# Patient Record
Sex: MALE | Race: WHITE | NOT HISPANIC OR LATINO | Employment: OTHER | URBAN - NONMETROPOLITAN AREA
[De-identification: names, ages, dates, MRNs, and addresses within clinical notes are randomized per-mention and may not be internally consistent; named-entity substitution may affect disease eponyms.]

---

## 2017-01-04 ENCOUNTER — OFFICE VISIT (OUTPATIENT)
Dept: OPHTHALMOLOGY | Facility: CLINIC | Age: 62
End: 2017-01-04

## 2017-01-04 ENCOUNTER — OFFICE VISIT (OUTPATIENT)
Dept: ENDOCRINOLOGY | Facility: CLINIC | Age: 62
End: 2017-01-04

## 2017-01-04 VITALS
SYSTOLIC BLOOD PRESSURE: 128 MMHG | WEIGHT: 241.7 LBS | DIASTOLIC BLOOD PRESSURE: 80 MMHG | HEART RATE: 56 BPM | HEIGHT: 73 IN | BODY MASS INDEX: 32.03 KG/M2

## 2017-01-04 DIAGNOSIS — R94.6 ABNORMAL THYROID FUNCTION TEST: ICD-10-CM

## 2017-01-04 DIAGNOSIS — H26.9 CATARACT: ICD-10-CM

## 2017-01-04 DIAGNOSIS — I10 ESSENTIAL HYPERTENSION: ICD-10-CM

## 2017-01-04 DIAGNOSIS — E78.2 MIXED HYPERLIPIDEMIA: ICD-10-CM

## 2017-01-04 DIAGNOSIS — E11.9 DIABETES MELLITUS WITHOUT COMPLICATION (HCC): Primary | ICD-10-CM

## 2017-01-04 DIAGNOSIS — E55.9 VITAMIN D DEFICIENCY: ICD-10-CM

## 2017-01-04 PROCEDURE — 99214 OFFICE O/P EST MOD 30 MIN: CPT | Performed by: NURSE PRACTITIONER

## 2017-01-04 PROCEDURE — 99213 OFFICE O/P EST LOW 20 MIN: CPT | Performed by: OPHTHALMOLOGY

## 2017-01-04 NOTE — PROGRESS NOTES
Subjective    Chasity Xavier is a 61 y.o. male. he is here today for follow-up.    History of Present Illness     Duration/Timing:  Diabetes mellitus type 2, Age at onset of diabetes: 35 years, Onset of symptoms gradual  timing - constant    quality - uncontrolled    severity - high since disease related complications    Has been in the hospital since last visit due to his heart     Severity (Complications/Hospitalizations)  Secondary Macrovascular Complications:  CAD  CHF, EF 20%    CABG x 5 4 weeks ago in [place]--March 2014    defibrillator replaced March 2015        Secondary Microvascular Complications:  Date of last Microalbumin Assessment 04/28/2014, No proteinuria, Date of last Protein/Creatinine Assessment August 2013    Context  Diabetes Regimen:  Insulin, Not on Oral Medications, Compliant with regimen, Last HgbA1c% 8 from Dec. 2016  Blood Glucose Readings  states no higher than 160 on his meter  Diet  approx. 60 to 75 g cho per meal  Exercise:  Exercises  walking    Associated Signs/Symptoms  Hyperglycemic Symptoms:  No polyuria, No polydipsia, No polyphagia, Weight loss  Hypoglycemic Episodes:  No documented hypoglycemia      The following portions of the patient's history were reviewed and updated as appropriate:   Past Medical History   Diagnosis Date   • Chronic kidney disease    • Coronary atherosclerosis    • Hyperlipidemia    • Ischemic cardiomyopathy    • Obesity    • DULCE MARIA (obstructive sleep apnea)    • Osteoarthritis    • Solitary kidney, congenital    • Type 2 diabetes mellitus      Past Surgical History   Procedure Laterality Date   • Coronary artery bypass graft       x 5     History reviewed. No pertinent family history.    Current Outpatient Prescriptions   Medication Sig Dispense Refill   • amiodarone (PACERONE) 200 MG tablet Take 200 mg by mouth 2 (two) times a day.     • aspirin 81 MG EC tablet Take 1 tablet by mouth daily.     • atorvastatin (LIPITOR) 80 MG tablet Take 1 tablet  by mouth nightly.     • carvedilol (COREG) 12.5 MG tablet Take 12.5 mg by mouth 2 (two) times a day with meals.     • carvedilol CR (COREG CR) 80 MG 24 hr capsule Take 80 mg by mouth.     • cetirizine (zyrTEC) 10 MG tablet Take 1 tablet by mouth Daily. 30 tablet 1   • clopidogrel (PLAVIX) 75 MG tablet Take 1 tablet by mouth daily.     • Dapagliflozin-Metformin HCl ER 5-1000 MG tablet sustained-release 24 hour Take 1 tablet by mouth Daily. 30 tablet 2   • glucose blood test strip Accu-Chek Xin Plus In Vitro Strip; Patient Sig: Accu-Chek Xin Plus In Vitro Strip ; 200; 0; 13-May-2013; Active     • guaiFENesin (MUCINEX) 600 MG 12 hr tablet Take 2 tablets by mouth 2 (Two) Times a Day. 20 tablet 1   • Multiple Vitamins-Minerals (MULTIVITAL) tablet Take 1 tablet by mouth daily.     • omega-3 acid ethyl esters (LOVAZA) 1 G capsule Take 2 capsules by mouth daily.     • valsartan (DIOVAN) 80 MG tablet Take 40 mg by mouth Daily.       No current facility-administered medications for this visit.      No Known Allergies  Social History     Social History   • Marital status:      Spouse name: N/A   • Number of children: N/A   • Years of education: N/A     Social History Main Topics   • Smoking status: Never Smoker   • Smokeless tobacco: None   • Alcohol use No   • Drug use: Defer   • Sexual activity: Defer     Other Topics Concern   • None     Social History Narrative       Review of Systems  Review of Systems   Constitutional: Negative for activity change, appetite change, chills, diaphoresis and fatigue.   HENT: Negative for congestion, dental problem, drooling, ear discharge, ear pain, facial swelling, sneezing, sore throat, tinnitus, trouble swallowing and voice change.    Eyes: Negative for photophobia, pain, discharge, redness, itching and visual disturbance.   Respiratory: Negative for apnea, cough, choking, chest tightness and shortness of breath.    Cardiovascular: Negative for chest pain, palpitations and leg  "swelling.   Gastrointestinal: Negative for abdominal distention, abdominal pain, constipation, diarrhea, nausea and vomiting.   Endocrine: Negative for cold intolerance, heat intolerance, polydipsia, polyphagia and polyuria.   Genitourinary: Negative for difficulty urinating, dysuria, frequency, hematuria and urgency.   Musculoskeletal: Negative for arthralgias, back pain, gait problem, joint swelling, myalgias, neck pain and neck stiffness.   Skin: Negative for color change, pallor, rash and wound.   Allergic/Immunologic: Negative for environmental allergies, food allergies and immunocompromised state.   Neurological: Negative for dizziness, tremors, facial asymmetry, weakness, light-headedness, numbness and headaches.   Hematological: Negative for adenopathy. Does not bruise/bleed easily.   Psychiatric/Behavioral: Negative for agitation, behavioral problems, confusion, decreased concentration, dysphoric mood and sleep disturbance.        Objective      Visit Vitals   • /80 (BP Location: Left arm, Patient Position: Sitting, Cuff Size: Adult)   • Pulse 56   • Ht 73\" (185.4 cm)   • Wt 241 lb 11.2 oz (110 kg)   • BMI 31.89 kg/m2     Physical Exam   Constitutional: He is oriented to person, place, and time. He appears well-developed and well-nourished. No distress.   HENT:   Head: Normocephalic and atraumatic.   Right Ear: External ear normal.   Left Ear: External ear normal.   Nose: Nose normal.   Eyes: Conjunctivae and EOM are normal. Pupils are equal, round, and reactive to light.   Neck: Normal range of motion. Neck supple. No tracheal deviation present. No thyromegaly present.   Cardiovascular: Normal rate, regular rhythm and normal heart sounds.    No murmur heard.  Pulmonary/Chest: Effort normal and breath sounds normal. No respiratory distress. He has no wheezes.   Abdominal: Soft. Bowel sounds are normal. There is no tenderness. There is no rebound and no guarding.   Musculoskeletal: Normal range of " motion. He exhibits no edema, tenderness or deformity.   Neurological: He is alert and oriented to person, place, and time. No cranial nerve deficit.   Skin: Skin is warm and dry. No rash noted.   Psychiatric: He has a normal mood and affect. His behavior is normal. Judgment and thought content normal.       Lab Review  GLUCOSE (mg/dl)   Date Value   12/14/2016 170 (H)   11/05/2016 175 (H)   11/04/2016 372 (H)     SODIUM (mmol/L)   Date Value   12/14/2016 143   11/05/2016 143   11/04/2016 139     POTASSIUM (mmol/L)   Date Value   12/14/2016 4.3   11/05/2016 4.4   11/04/2016 4.5     CHLORIDE (mmol/L)   Date Value   12/14/2016 104   11/05/2016 107   11/04/2016 104     CO2 (mmol/L)   Date Value   12/14/2016 22   11/05/2016 26   11/04/2016 24     BUN (mg/dl)   Date Value   12/14/2016 8   11/05/2016 15   11/04/2016 16     CREATININE (mg/dl)   Date Value   12/14/2016 0.8   11/05/2016 0.9   11/04/2016 1.0     HEMOGLOBIN A1C (%TotHgb)   Date Value   12/12/2016 8.0 (H)   05/31/2016 10.1 (H)   01/08/2016 8.6 (H)     TRIGLYCERIDES (mg/dl)   Date Value   05/31/2016 219 (H)   09/02/2015 239 (H)   04/29/2014 135     LDL CHOLESTEROL  (mg/dL)   Date Value   03/22/2014     Unable to perform calculation due to elevated triglycerides. Direct LDL performed.       Assessment/Plan      1. Diabetes mellitus without complication    2. Mixed hyperlipidemia    3. Essential hypertension    4. Abnormal thyroid function test    5. Vitamin D deficiency    .    Medications prescribed:  Outpatient Encounter Prescriptions as of 1/4/2017   Medication Sig Dispense Refill   • amiodarone (PACERONE) 200 MG tablet Take 200 mg by mouth 2 (two) times a day.     • aspirin 81 MG EC tablet Take 1 tablet by mouth daily.     • atorvastatin (LIPITOR) 80 MG tablet Take 1 tablet by mouth nightly.     • carvedilol (COREG) 12.5 MG tablet Take 12.5 mg by mouth 2 (two) times a day with meals.     • carvedilol CR (COREG CR) 80 MG 24 hr capsule Take 80 mg by mouth.      • cetirizine (zyrTEC) 10 MG tablet Take 1 tablet by mouth Daily. 30 tablet 1   • clopidogrel (PLAVIX) 75 MG tablet Take 1 tablet by mouth daily.     • Dapagliflozin-Metformin HCl ER 5-1000 MG tablet sustained-release 24 hour Take 1 tablet by mouth Daily. 30 tablet 2   • glucose blood test strip Accu-Chek Xin Plus In Vitro Strip; Patient Sig: Accu-Chek Xin Plus In Vitro Strip ; 200; 0; 13-May-2013; Active     • guaiFENesin (MUCINEX) 600 MG 12 hr tablet Take 2 tablets by mouth 2 (Two) Times a Day. 20 tablet 1   • Multiple Vitamins-Minerals (MULTIVITAL) tablet Take 1 tablet by mouth daily.     • omega-3 acid ethyl esters (LOVAZA) 1 G capsule Take 2 capsules by mouth daily.     • valsartan (DIOVAN) 80 MG tablet Take 40 mg by mouth Daily.       No facility-administered encounter medications on file as of 1/4/2017.        Orders placed during this encounter include:  Orders Placed This Encounter   Procedures   • Comprehensive Metabolic Panel   • Hemoglobin A1c   • Vitamin D 25 Hydroxy   • TSH   • Lipid Panel     Glycemic Management      stopped lantus -- restart    start Toujeo 20 units at night---not taking --please restart    discussed self titration    stopped the novolog with meals -- please restart    start Novolog 1 unit per 15 grams of CHO     + correction      add for every carb, he has been drinking milk without insulin     farxiga--Xigduo  5/1000mg one daily with breakfast    side effects discussed     tanzeum 30mg sc once weekly--stopped causing vomiting     if you have low sugars after meals stop the humalog  Lipid Management  current regimen     Niaspan 500 mg q day--stopped  Pravachol 20 mg q day-- stopped  Lovaza 1 gram b.i.d.-- taking      taking Lipitor 80 mg daily      June 2016    total chol - 102  Tg - 219  HDL - 29  LDL - 29      Blood Pressure Management:  Control of blood pressure  regimen     diovan 40mg one daily  coreg- 6.25 one tablet BID    No longer on amiodarone    Dr. Mix adjusts      Microvascular Complication Monitoring    last eye exam Jan. 2017 --no DR    No neuropathy  Immunizations:  Last influenza immunization Oct. 2016  [facility]    Vitamin d def  April 2014    Vit. D - 29    taking MVI daily    June 2016    vit d - nl  Other Diabetes Related Aspects  April 2014    B12- nl    TSH- 4.67-- will reassess    May 2015    TSH - nl    June 2016    TSH - nl        4. Follow-up: Return in about 6 months (around 7/4/2017) for Recheck.

## 2017-01-04 NOTE — PROGRESS NOTES
Subjective   Chasity Xavier is a 61 y.o. male.   Chief Complaint   Patient presents with   • Diabetic Eye Exam       HPI     Diabetic Eye Exam   Vision is stable.  Associated symptoms include blurred vision.  Diabetes characteristics include Type 2 and taking oral medications.  Duration of years.  Blood sugar level fluctuates.       Last edited by Arsen Szymanski MD on 1/4/2017  8:34 AM. (History)          Review of Systems   Eyes: Positive for visual disturbance. Negative for pain.       Objective   Visual Acuity (Snellen - Linear)      Right Left   Dist sc 20/100 -2 20/40            Manifest Refraction      Sphere Cylinder Axis Add   Right -1.50 +0.25 075 +2.25   Left -0.50   +2.25               Pupils      Pupils   Right PERRL   Left PERRL           Confrontational Visual Fields     Visual Fields      Left Right   Result Full Full                  Extraocular Movement      Right Left   Result Full, Ortho Full, Ortho              Tonometry (Applanation, 8:37 AM)      Right Left   Pressure 16 16              Main Ophthalmology Exam     External Exam      Right Left    External Normal Normal      Slit Lamp Exam      Right Left    Lids/Lashes Normal Normal    Conjunctiva/Sclera White and quiet White and quiet    Cornea Clear Clear    Anterior Chamber Deep and quiet Deep and quiet    Iris Round and reactive Round and reactive    Lens 1+ Nuclear sclerosis 1+ Nuclear sclerosis    Vitreous Normal Normal      Fundus Exam      Right Left    Disc Normal Normal    Macula Normal Normal    Vessels Normal Normal    Periphery Normal Normal    No BDR                Assessment/Plan   Chasity was seen today for diabetic eye exam.    Diagnoses and all orders for this visit:    Diabetes mellitus without complication    Cataract

## 2017-01-04 NOTE — MR AVS SNAPSHOT
Chasity Ryan Xavier   1/4/2017 1:15 PM   Office Visit    Dept Phone:  [phone]   Encounter #:  [id number]    Provider:  ROSAMARIA Silverman   Department:  [facility] ENDOCRINOLOGY                Your Full Care Plan              Your Updated Medication List          This list is accurate as of: 1/4/17  1:45 PM.  Always use your most recent med list.                amiodarone 200 MG tablet   Commonly known as:  PACERONE       aspirin 81 MG EC tablet       atorvastatin 80 MG tablet   Commonly known as:  LIPITOR       carvedilol 12.5 MG tablet   Commonly known as:  COREG       cetirizine 10 MG tablet   Commonly known as:  zyrTEC   Take 1 tablet by mouth Daily.       clopidogrel 75 MG tablet   Commonly known as:  PLAVIX       COREG CR 80 MG 24 hr capsule   Generic drug:  carvedilol CR       Dapagliflozin-Metformin HCl ER 5-1000 MG tablet sustained-release 24 hour   Take 1 tablet by mouth Daily.       glucose blood test strip       guaiFENesin 600 MG 12 hr tablet   Commonly known as:  MUCINEX   Take 2 tablets by mouth 2 (Two) Times a Day.       MULTIVITAL tablet       omega-3 acid ethyl esters 1 G capsule   Commonly known as:  LOVAZA       valsartan 80 MG tablet   Commonly known as:  DIOVAN               We Performed the Following     CBC & Differential     Comprehensive Metabolic Panel     Hemoglobin A1c     Lipid Panel     TSH     Vitamin D 25 Hydroxy       You Were Diagnosed With        Codes Comments    Diabetes mellitus without complication    -  Primary ICD-10-CM: E11.9  ICD-9-CM: 250.00     Mixed hyperlipidemia     ICD-10-CM: E78.2  ICD-9-CM: 272.2     Essential hypertension     ICD-10-CM: I10  ICD-9-CM: 401.9     Abnormal thyroid function test     ICD-10-CM: R94.6  ICD-9-CM: 794.5     Vitamin D deficiency     ICD-10-CM: E55.9  ICD-9-CM: 268.9       Instructions     None    Patient Instructions History      Upcoming Appointments     Visit Type Date Time  "Department    FOLLOW UP 1/4/2017  1:15 PM [facility] ENDOCRINOLOGY MAD    OFFICE VISIT 1/4/2017  8:15 AM [facility] OPHTHALMOLOGY MAD    OFFICE VISIT 1/20/2017  9:00 AM [facility] FM RESIDENT MAD    FOLLOW UP 7/5/2017  8:00 AM [facility] ENDOCRINOLOGY MAD      MyChart Signup     Our records indicate that you have declined [facility] MyChart signup. If you would like to sign up for MyChart, please email [email or web address] or call [phone] to obtain an activation code.             Other Info from Your Visit           Your Appointments     Jan 20, 2017  9:00 AM CST   Office Visit with Akbar Napoles MD   [facility] FAMILY MEDICINE (--)    [address]   [facility] [address] [phone]           Arrive 15 minutes prior to appointment.            Jul 05, 2017  8:00 AM CDT   Follow Up with ROSAMARIA Silverman   [facility] ENDOCRINOLOGY (--)    [address]  [address] [place] [address] [phone]           Arrive 15 minutes prior to appointment.            Petr 10, 2018  8:15 AM CST   Office Visit with Arsen Szymanski MD   [facility] OPHTHALMOLOGY (--)    [address] [place] [address] [address]  [address] [place] [address] [phone]           Arrive 15 minutes prior to appointment.              Allergies     No Known Allergies      Reason for Visit     Diabetes luis miguel      Vital Signs     Blood Pressure Pulse Height Weight Body Mass Index Smoking Status    128/80 (BP Location: Left arm, Patient Position: Sitting, Cuff Size: Adult) 56 73\" (185.4 cm) 241 lb 11.2 oz (110 kg) 31.89 kg/m2 Never Smoker      Problems and Diagnoses Noted     Diabetes mellitus without complication    Mixed hyperlipidemia        High blood pressure        Abnormal thyroid function test        Vitamin D deficiency            "

## 2017-01-04 NOTE — MR AVS SNAPSHOT
Chasity Xavier   1/4/2017 8:15 AM   Office Visit    Dept Phone:  [phone]   Encounter #:  [id number]    Provider:  Arsen Szymanski MD   Department:  [facility] OPHTHALMOLOGY                Your Full Care Plan              Your Updated Medication List          This list is accurate as of: 1/4/17  9:01 AM.  Always use your most recent med list.                amiodarone 200 MG tablet   Commonly known as:  PACERONE       aspirin 81 MG EC tablet       atorvastatin 80 MG tablet   Commonly known as:  LIPITOR       carvedilol 12.5 MG tablet   Commonly known as:  COREG       cetirizine 10 MG tablet   Commonly known as:  zyrTEC   Take 1 tablet by mouth Daily.       clopidogrel 75 MG tablet   Commonly known as:  PLAVIX       COREG CR 80 MG 24 hr capsule   Generic drug:  carvedilol CR       Dapagliflozin-Metformin HCl ER 5-1000 MG tablet sustained-release 24 hour   Take 1 tablet by mouth Daily.       glucose blood test strip       guaiFENesin 600 MG 12 hr tablet   Commonly known as:  MUCINEX   Take 2 tablets by mouth 2 (Two) Times a Day.       MULTIVITAL tablet       omega-3 acid ethyl esters 1 G capsule   Commonly known as:  LOVAZA       valsartan 80 MG tablet   Commonly known as:  DIOVAN               You Were Diagnosed With        Codes Comments    Diabetes mellitus without complication    -  Primary ICD-10-CM: E11.9  ICD-9-CM: 250.00     Cataract     ICD-10-CM: H26.9  ICD-9-CM: 366.9       Instructions     None    Patient Instructions History      Upcoming Appointments     Visit Type Date Time Department    FOLLOW UP 1/4/2017  1:15 PM [facility] ENDOCRINOLOGY MAD    OFFICE VISIT 1/4/2017  8:15 AM [facility] OPHTHALMOLOGY MAD    OFFICE VISIT 1/20/2017  9:00 AM W  RESIDENT MAD      MyChart Signup     Our records indicate that you have declined [facility] MyChart signup. If you would like to sign up for [facility], please email [email or web address] or call [phone] to  obtain an activation code.             Other Info from Your Visit           Your Appointments     Jan 04, 2017  1:15 PM CST   Follow Up with ROSAMARIA Silverman   [facility] ENDOCRINOLOGY (--)    [address]  [address] [place] [address] [facility] [address] [phone]           Arrive 15 minutes prior to appointment.            Jan 20, 2017  9:00 AM CST   Office Visit with Akbar Napoles MD   [facility] FAMILY MEDICINE (--)    [address] [facility] [address] [phone]           Arrive 15 minutes prior to appointment.            Petr 10, 2018  8:15 AM CST   Office Visit with Arsen Szymanski MD   [facility] OPHTHALMOLOGY (--)    [address] [place] [address] [address]  [address] [place] [address] [phone]           Arrive 15 minutes prior to appointment.              Allergies     No Known Allergies      Reason for Visit     Diabetic Eye Exam           Vital Signs     Smoking Status                   Never Smoker           Problems and Diagnoses Noted     Cataract    Diabetes mellitus without complication

## 2017-07-03 ENCOUNTER — APPOINTMENT (OUTPATIENT)
Dept: LAB | Facility: HOSPITAL | Age: 62
End: 2017-07-03

## 2017-07-03 LAB
25(OH)D3 SERPL-MCNC: 42.3 NG/ML (ref 30–100)
ALBUMIN SERPL-MCNC: 4 G/DL (ref 3.4–4.8)
ALBUMIN/GLOB SERPL: 1.3 G/DL (ref 1.1–1.8)
ALP SERPL-CCNC: 85 U/L (ref 38–126)
ALT SERPL W P-5'-P-CCNC: 50 U/L (ref 21–72)
ANION GAP SERPL CALCULATED.3IONS-SCNC: 11 MMOL/L (ref 5–15)
ARTICHOKE IGE QN: 40 MG/DL (ref 1–129)
AST SERPL-CCNC: 44 U/L (ref 17–59)
BASOPHILS # BLD AUTO: 0.04 10*3/MM3 (ref 0–0.2)
BASOPHILS NFR BLD AUTO: 0.5 % (ref 0–2)
BILIRUB SERPL-MCNC: 1.9 MG/DL (ref 0.2–1.3)
BUN BLD-MCNC: 17 MG/DL (ref 7–21)
BUN/CREAT SERPL: 18.5 (ref 7–25)
CALCIUM SPEC-SCNC: 8.7 MG/DL (ref 8.4–10.2)
CHLORIDE SERPL-SCNC: 106 MMOL/L (ref 95–110)
CHOLEST SERPL-MCNC: 96 MG/DL (ref 0–199)
CO2 SERPL-SCNC: 23 MMOL/L (ref 22–31)
CREAT BLD-MCNC: 0.92 MG/DL (ref 0.7–1.3)
DEPRECATED RDW RBC AUTO: 37.4 FL (ref 35.1–43.9)
EOSINOPHIL # BLD AUTO: 0.4 10*3/MM3 (ref 0–0.7)
EOSINOPHIL NFR BLD AUTO: 4.8 % (ref 0–7)
ERYTHROCYTE [DISTWIDTH] IN BLOOD BY AUTOMATED COUNT: 12.1 % (ref 11.5–14.5)
GFR SERPL CREATININE-BSD FRML MDRD: 84 ML/MIN/1.73 (ref 60–113)
GLOBULIN UR ELPH-MCNC: 3.2 GM/DL (ref 2.3–3.5)
GLUCOSE BLD-MCNC: 218 MG/DL (ref 60–100)
HBA1C MFR BLD: 9.69 % (ref 4–5.6)
HCT VFR BLD AUTO: 46.5 % (ref 39–49)
HDLC SERPL-MCNC: 25 MG/DL (ref 60–200)
HGB BLD-MCNC: 16.3 G/DL (ref 13.7–17.3)
IMM GRANULOCYTES # BLD: 0.02 10*3/MM3 (ref 0–0.02)
IMM GRANULOCYTES NFR BLD: 0.2 % (ref 0–0.5)
LDLC/HDLC SERPL: 1.05 {RATIO} (ref 0–3.55)
LYMPHOCYTES # BLD AUTO: 2.11 10*3/MM3 (ref 0.6–4.2)
LYMPHOCYTES NFR BLD AUTO: 25.5 % (ref 10–50)
MCH RBC QN AUTO: 30.1 PG (ref 26.5–34)
MCHC RBC AUTO-ENTMCNC: 35.1 G/DL (ref 31.5–36.3)
MCV RBC AUTO: 85.8 FL (ref 80–98)
MONOCYTES # BLD AUTO: 0.73 10*3/MM3 (ref 0–0.9)
MONOCYTES NFR BLD AUTO: 8.8 % (ref 0–12)
NEUTROPHILS # BLD AUTO: 4.99 10*3/MM3 (ref 2–8.6)
NEUTROPHILS NFR BLD AUTO: 60.2 % (ref 37–80)
PLATELET # BLD AUTO: 130 10*3/MM3 (ref 150–450)
PMV BLD AUTO: 12.2 FL (ref 8–12)
POTASSIUM BLD-SCNC: 4.8 MMOL/L (ref 3.5–5.1)
PROT SERPL-MCNC: 7.2 G/DL (ref 6.3–8.6)
RBC # BLD AUTO: 5.42 10*6/MM3 (ref 4.37–5.74)
SODIUM BLD-SCNC: 140 MMOL/L (ref 137–145)
TRIGL SERPL-MCNC: 224 MG/DL (ref 20–199)
TSH SERPL DL<=0.05 MIU/L-ACNC: 2.66 MIU/ML (ref 0.46–4.68)
WBC NRBC COR # BLD: 8.29 10*3/MM3 (ref 3.2–9.8)

## 2017-07-03 PROCEDURE — 82306 VITAMIN D 25 HYDROXY: CPT | Performed by: NURSE PRACTITIONER

## 2017-07-03 PROCEDURE — 85025 COMPLETE CBC W/AUTO DIFF WBC: CPT | Performed by: NURSE PRACTITIONER

## 2017-07-03 PROCEDURE — 83036 HEMOGLOBIN GLYCOSYLATED A1C: CPT | Performed by: NURSE PRACTITIONER

## 2017-07-03 PROCEDURE — 36415 COLL VENOUS BLD VENIPUNCTURE: CPT | Performed by: NURSE PRACTITIONER

## 2017-07-03 PROCEDURE — 84443 ASSAY THYROID STIM HORMONE: CPT | Performed by: NURSE PRACTITIONER

## 2017-07-03 PROCEDURE — 80061 LIPID PANEL: CPT | Performed by: NURSE PRACTITIONER

## 2017-07-03 PROCEDURE — 80053 COMPREHEN METABOLIC PANEL: CPT | Performed by: NURSE PRACTITIONER

## 2017-07-05 ENCOUNTER — OFFICE VISIT (OUTPATIENT)
Dept: ENDOCRINOLOGY | Facility: CLINIC | Age: 62
End: 2017-07-05

## 2017-07-05 VITALS
SYSTOLIC BLOOD PRESSURE: 122 MMHG | WEIGHT: 242 LBS | BODY MASS INDEX: 32.07 KG/M2 | HEIGHT: 73 IN | DIASTOLIC BLOOD PRESSURE: 86 MMHG | HEART RATE: 71 BPM

## 2017-07-05 DIAGNOSIS — E11.9 DIABETES MELLITUS WITHOUT COMPLICATION (HCC): Primary | ICD-10-CM

## 2017-07-05 DIAGNOSIS — E55.9 VITAMIN D DEFICIENCY: ICD-10-CM

## 2017-07-05 DIAGNOSIS — E78.2 MIXED HYPERLIPIDEMIA: ICD-10-CM

## 2017-07-05 DIAGNOSIS — I10 ESSENTIAL HYPERTENSION: ICD-10-CM

## 2017-07-05 PROCEDURE — 99214 OFFICE O/P EST MOD 30 MIN: CPT | Performed by: NURSE PRACTITIONER

## 2017-07-05 NOTE — PROGRESS NOTES
Subjective    Chasity Xavier is a 61 y.o. male. he is here today for follow-up.    History of Present Illness         Duration/Timing:  Diabetes mellitus type 2, Age at onset of diabetes: 35 years, Onset of symptoms gradual  timing - constant     quality - uncontrolled     severity - high since disease related complications     Has been in the hospital since last visit due to his heart      Severity (Complications/Hospitalizations)  Secondary Macrovascular Complications:  CAD  CHF, EF 20%     CABG x 5 4 weeks ago in [place]--March 2014     defibrillator replaced March 2015           Secondary Microvascular Complications:  Date of last Microalbumin Assessment 04/28/2014, No proteinuria, Date of last Protein/Creatinine Assessment August 2013     Context  Diabetes Regimen:  Insulin, Not on Oral Medications, Compliant with regimen, Last HgbA1c% 8 from Dec. 2016    Lab Results   Component Value Date    HGBA1C 9.69 (H) 07/03/2017       Blood Glucose Readings  states no higher than 160 on his meter  Diet  approx. 60 to 75 g cho per meal  Exercise:  Exercises  walking     Associated Signs/Symptoms  Hyperglycemic Symptoms:  No polyuria, No polydipsia, No polyphagia, Weight loss  Hypoglycemic Episodes:  No documented hypoglycemia         The following portions of the patient's history were reviewed and updated as appropriate:   Past Medical History:   Diagnosis Date   • Chronic kidney disease    • Coronary atherosclerosis    • Hyperlipidemia    • Ischemic cardiomyopathy    • Obesity    • DULCE MARIA (obstructive sleep apnea)    • Osteoarthritis    • Solitary kidney, congenital    • Type 2 diabetes mellitus      Past Surgical History:   Procedure Laterality Date   • CORONARY ARTERY BYPASS GRAFT      x 5     History reviewed. No pertinent family history.    Current Outpatient Prescriptions   Medication Sig Dispense Refill   • aspirin 81 MG EC tablet Take 1 tablet by mouth daily.     • atorvastatin (LIPITOR) 80 MG tablet Take 1  tablet by mouth nightly.     • carvedilol (COREG) 12.5 MG tablet Take 12.5 mg by mouth 2 (two) times a day with meals.     • carvedilol CR (COREG CR) 80 MG 24 hr capsule Take 80 mg by mouth.     • cetirizine (zyrTEC) 10 MG tablet Take 1 tablet by mouth Daily. 30 tablet 1   • clopidogrel (PLAVIX) 75 MG tablet Take 1 tablet by mouth daily.     • Dapagliflozin-Metformin HCl ER 5-1000 MG tablet sustained-release 24 hour Take 1 tablet by mouth Daily. 90 tablet 1   • guaiFENesin (MUCINEX) 600 MG 12 hr tablet Take 2 tablets by mouth 2 (Two) Times a Day. 20 tablet 1   • magnesium oxide (MAGOX) 400 (241.3 MG) MG tablet tablet Take 400 mg by mouth Daily.     • Multiple Vitamins-Minerals (MULTIVITAL) tablet Take 1 tablet by mouth daily.     • omega-3 acid ethyl esters (LOVAZA) 1 G capsule Take 2 capsules by mouth daily.     • valsartan (DIOVAN) 80 MG tablet Take 40 mg by mouth Daily.     • glucose blood test strip Accu-Chek Xin Plus In Vitro Strip; Patient Sig: Accu-Chek Xin Plus In Vitro Strip ; 200; 0; 13-May-2013; Active       No current facility-administered medications for this visit.      No Known Allergies  Social History     Social History   • Marital status:      Spouse name: N/A   • Number of children: N/A   • Years of education: N/A     Social History Main Topics   • Smoking status: Never Smoker   • Smokeless tobacco: Never Used   • Alcohol use No   • Drug use: Defer   • Sexual activity: Defer     Other Topics Concern   • None     Social History Narrative       Review of Systems  Review of Systems   Constitutional: Negative for activity change, appetite change, chills, diaphoresis and fatigue.   HENT: Negative for congestion, dental problem, drooling, ear discharge, ear pain, facial swelling, sneezing, sore throat, tinnitus, trouble swallowing and voice change.    Eyes: Negative for photophobia, pain, discharge, redness, itching and visual disturbance.   Respiratory: Negative for apnea, cough, choking,  "chest tightness and shortness of breath.    Cardiovascular: Negative for chest pain, palpitations and leg swelling.   Gastrointestinal: Negative for abdominal distention, abdominal pain, constipation, diarrhea, nausea and vomiting.   Endocrine: Negative for cold intolerance, heat intolerance, polydipsia, polyphagia and polyuria.   Genitourinary: Negative for difficulty urinating, dysuria, frequency, hematuria and urgency.   Musculoskeletal: Negative for arthralgias, back pain, gait problem, joint swelling, myalgias, neck pain and neck stiffness.   Skin: Negative for color change, pallor, rash and wound.   Allergic/Immunologic: Negative for environmental allergies, food allergies and immunocompromised state.   Neurological: Negative for dizziness, tremors, facial asymmetry, weakness, light-headedness, numbness and headaches.   Hematological: Negative for adenopathy. Does not bruise/bleed easily.   Psychiatric/Behavioral: Negative for agitation, behavioral problems, confusion, decreased concentration and sleep disturbance.        Objective    /86 (BP Location: Right arm, Patient Position: Sitting, Cuff Size: Adult)  Pulse 71  Ht 73\" (185.4 cm)  Wt 242 lb (110 kg)  BMI 31.93 kg/m2  Physical Exam   Constitutional: He is oriented to person, place, and time. He appears well-developed and well-nourished. No distress.   HENT:   Head: Normocephalic and atraumatic.   Right Ear: External ear normal.   Left Ear: External ear normal.   Nose: Nose normal.   Eyes: Conjunctivae and EOM are normal. Pupils are equal, round, and reactive to light.   Neck: Normal range of motion. Neck supple. No tracheal deviation present. No thyromegaly present.   Cardiovascular: Normal rate, regular rhythm and normal heart sounds.    No murmur heard.  Pulmonary/Chest: Effort normal and breath sounds normal. No respiratory distress. He has no wheezes.   Abdominal: Soft. Bowel sounds are normal. There is no tenderness. There is no rebound and " no guarding.   Musculoskeletal: Normal range of motion. He exhibits no edema, tenderness or deformity.   Neurological: He is alert and oriented to person, place, and time. No cranial nerve deficit.   Skin: Skin is warm and dry. No rash noted.   Psychiatric: He has a normal mood and affect. His behavior is normal. Judgment and thought content normal.       Lab Review  Glucose   Date Value   07/03/2017 218 mg/dL (H)   12/14/2016 170 mg/dl (H)   11/05/2016 175 mg/dl (H)   11/04/2016 372 mg/dl (H)     Sodium (mmol/L)   Date Value   07/03/2017 140   12/14/2016 143   11/05/2016 143   11/04/2016 139     Potassium (mmol/L)   Date Value   07/03/2017 4.8   12/14/2016 4.3   11/05/2016 4.4   11/04/2016 4.5     Chloride (mmol/L)   Date Value   07/03/2017 106   12/14/2016 104   11/05/2016 107   11/04/2016 104     CO2 (mmol/L)   Date Value   07/03/2017 23.0   12/14/2016 22   11/05/2016 26   11/04/2016 24     BUN   Date Value   07/03/2017 17 mg/dL   12/14/2016 8 mg/dl   11/05/2016 15 mg/dl   11/04/2016 16 mg/dl     Creatinine   Date Value   07/03/2017 0.92 mg/dL   12/14/2016 0.8 mg/dl   11/05/2016 0.9 mg/dl   11/04/2016 1.0 mg/dl     Hemoglobin A1C   Date Value   07/03/2017 9.69 % (H)   12/12/2016 8.0 %TotHgb (H)   05/31/2016 10.1 %TotHgb (H)   01/08/2016 8.6 %TotHgb (H)     Triglycerides   Date Value   07/03/2017 224 mg/dL (H)   05/31/2016 219 mg/dl (H)   09/02/2015 239 mg/dl (H)   04/29/2014 135 mg/dl     LDL Cholesterol  (mg/dL)   Date Value   03/22/2014     Unable to perform calculation due to elevated triglycerides. Direct LDL performed.       Assessment/Plan      1. Diabetes mellitus without complication    2. Mixed hyperlipidemia    3. Vitamin D deficiency    4. Essential hypertension    .    Medications prescribed:  Outpatient Encounter Prescriptions as of 7/5/2017   Medication Sig Dispense Refill   • aspirin 81 MG EC tablet Take 1 tablet by mouth daily.     • atorvastatin (LIPITOR) 80 MG tablet Take 1 tablet by mouth  nightly.     • carvedilol (COREG) 12.5 MG tablet Take 12.5 mg by mouth 2 (two) times a day with meals.     • carvedilol CR (COREG CR) 80 MG 24 hr capsule Take 80 mg by mouth.     • cetirizine (zyrTEC) 10 MG tablet Take 1 tablet by mouth Daily. 30 tablet 1   • clopidogrel (PLAVIX) 75 MG tablet Take 1 tablet by mouth daily.     • Dapagliflozin-Metformin HCl ER 5-1000 MG tablet sustained-release 24 hour Take 1 tablet by mouth Daily. 90 tablet 1   • guaiFENesin (MUCINEX) 600 MG 12 hr tablet Take 2 tablets by mouth 2 (Two) Times a Day. 20 tablet 1   • magnesium oxide (MAGOX) 400 (241.3 MG) MG tablet tablet Take 400 mg by mouth Daily.     • Multiple Vitamins-Minerals (MULTIVITAL) tablet Take 1 tablet by mouth daily.     • omega-3 acid ethyl esters (LOVAZA) 1 G capsule Take 2 capsules by mouth daily.     • valsartan (DIOVAN) 80 MG tablet Take 40 mg by mouth Daily.     • glucose blood test strip Accu-Chek Xin Plus In Vitro Strip; Patient Sig: Accu-Chek Xin Plus In Vitro Strip ; 200; 0; 13-May-2013; Active     • [DISCONTINUED] amiodarone (PACERONE) 200 MG tablet Take 200 mg by mouth 2 (two) times a day.       No facility-administered encounter medications on file as of 7/5/2017.        Orders placed during this encounter include:  Orders Placed This Encounter   Procedures   • Comprehensive Metabolic Panel   • Hemoglobin A1c   • CBC & Differential     Order Specific Question:   Manual Differential     Answer:   No     Glycemic Management        stopped lantus -- restart    start Toujeo 20 units at night---not taking --please restart      discussed self titration     stopped the novolog with meals -- please restart     start Novolog 1 unit per 15 grams of CHO      + correction        add for every carb, he has been drinking milk without insulin      farxiga--Xigduo  5/1000mg one daily with breakfast     side effects discussed      tanzeum 30mg sc once weekly--stopped causing vomiting      if you have low sugars after meals  stop the humalog  Lipid Management  current regimen      Niaspan 500 mg q day--stopped  Pravachol 20 mg q day-- stopped  Lovaza 1 gram b.i.d.-- taking        taking Lipitor 80 mg daily        June 2016     total chol - 102  Tg - 219  HDL - 29  LDL - 29    Total Cholesterol   Date Value Ref Range Status   07/03/2017 96 0 - 199 mg/dL Final     Triglycerides   Date Value Ref Range Status   07/03/2017 224 (H) 20 - 199 mg/dL Final     HDL Cholesterol   Date Value Ref Range Status   07/03/2017 25 (L) 60 - 200 mg/dL Final     LDL Cholesterol    Date Value Ref Range Status   07/03/2017 40 1 - 129 mg/dL Final             Blood Pressure Management:  Control of blood pressure  regimen      diovan 40mg one daily  coreg- 6.25 one tablet BID     No longer on amiodarone     Dr. Mix adjusts      Microvascular Complication Monitoring     last eye exam Jan. 2017 --no DR Cisse neuropathy    Immunizations:  Last influenza immunization Oct. 2016    [facility]     Vitamin d def  April 2014     Vit. D - 29     taking MVI daily     June 2016     vit d - nl    July 2017    Vitamin d normal   Other Diabetes Related Aspects  April 2014     B12- nl     Lab Results   Component Value Date    TSH 2.660 07/03/2017           4. Follow-up: Return in about 3 months (around 10/5/2017) for Recheck.

## 2017-08-29 ENCOUNTER — HOSPITAL ENCOUNTER (INPATIENT)
Facility: HOSPITAL | Age: 62
LOS: 3 days | Discharge: ANOTHER HOSPITAL | End: 2017-09-01
Attending: EMERGENCY MEDICINE | Admitting: FAMILY MEDICINE

## 2017-08-29 ENCOUNTER — APPOINTMENT (OUTPATIENT)
Dept: GENERAL RADIOLOGY | Facility: HOSPITAL | Age: 62
End: 2017-08-29

## 2017-08-29 DIAGNOSIS — I47.20 VENTRICULAR TACHYCARDIA (HCC): Primary | ICD-10-CM

## 2017-08-29 DIAGNOSIS — Z78.9 IMPAIRED MOBILITY AND ACTIVITIES OF DAILY LIVING: ICD-10-CM

## 2017-08-29 DIAGNOSIS — Z74.09 IMPAIRED FUNCTIONAL MOBILITY, BALANCE, GAIT, AND ENDURANCE: ICD-10-CM

## 2017-08-29 DIAGNOSIS — I25.5 ISCHEMIC CARDIOMYOPATHY: ICD-10-CM

## 2017-08-29 DIAGNOSIS — Z74.09 IMPAIRED MOBILITY AND ACTIVITIES OF DAILY LIVING: ICD-10-CM

## 2017-08-29 PROBLEM — I50.22 CHRONIC SYSTOLIC CONGESTIVE HEART FAILURE: Status: ACTIVE | Noted: 2017-08-29

## 2017-08-29 PROBLEM — I25.810 CORONARY ARTERY DISEASE INVOLVING CORONARY BYPASS GRAFT OF NATIVE HEART WITHOUT ANGINA PECTORIS: Status: ACTIVE | Noted: 2017-08-29

## 2017-08-29 PROBLEM — Z45.02 ICD (IMPLANTABLE CARDIOVERTER-DEFIBRILLATOR) DISCHARGE: Status: ACTIVE | Noted: 2017-08-29

## 2017-08-29 LAB
ALBUMIN SERPL-MCNC: 4.3 G/DL (ref 3.4–4.8)
ALBUMIN/GLOB SERPL: 1.2 G/DL (ref 1.1–1.8)
ALP SERPL-CCNC: 100 U/L (ref 38–126)
ALT SERPL W P-5'-P-CCNC: 49 U/L (ref 21–72)
ANION GAP SERPL CALCULATED.3IONS-SCNC: 16 MMOL/L (ref 5–15)
APTT PPP: 25 SECONDS (ref 20–40.3)
AST SERPL-CCNC: 35 U/L (ref 17–59)
BASOPHILS # BLD AUTO: 0.04 10*3/MM3 (ref 0–0.2)
BASOPHILS NFR BLD AUTO: 0.3 % (ref 0–2)
BILIRUB SERPL-MCNC: 2.4 MG/DL (ref 0.2–1.3)
BUN BLD-MCNC: 17 MG/DL (ref 7–21)
BUN/CREAT SERPL: 14.9 (ref 7–25)
CALCIUM SPEC-SCNC: 9.1 MG/DL (ref 8.4–10.2)
CHLORIDE SERPL-SCNC: 102 MMOL/L (ref 95–110)
CK MB SERPL-CCNC: 1.77 NG/ML (ref 0–5)
CK SERPL-CCNC: 49 U/L (ref 55–170)
CO2 SERPL-SCNC: 21 MMOL/L (ref 22–31)
CREAT BLD-MCNC: 1.14 MG/DL (ref 0.7–1.3)
DEPRECATED RDW RBC AUTO: 39.5 FL (ref 35.1–43.9)
EOSINOPHIL # BLD AUTO: 0.33 10*3/MM3 (ref 0–0.7)
EOSINOPHIL NFR BLD AUTO: 2.6 % (ref 0–7)
ERYTHROCYTE [DISTWIDTH] IN BLOOD BY AUTOMATED COUNT: 12.6 % (ref 11.5–14.5)
GFR SERPL CREATININE-BSD FRML MDRD: 65 ML/MIN/1.73 (ref 60–113)
GLOBULIN UR ELPH-MCNC: 3.6 GM/DL (ref 2.3–3.5)
GLUCOSE BLD-MCNC: 265 MG/DL (ref 60–100)
GLUCOSE BLDC GLUCOMTR-MCNC: 275 MG/DL (ref 70–130)
HCT VFR BLD AUTO: 48.4 % (ref 39–49)
HGB BLD-MCNC: 17 G/DL (ref 13.7–17.3)
HOLD SPECIMEN: NORMAL
HOLD SPECIMEN: NORMAL
IMM GRANULOCYTES # BLD: 0.03 10*3/MM3 (ref 0–0.02)
IMM GRANULOCYTES NFR BLD: 0.2 % (ref 0–0.5)
INR PPP: 1.11 (ref 0.8–1.2)
LYMPHOCYTES # BLD AUTO: 4.9 10*3/MM3 (ref 0.6–4.2)
LYMPHOCYTES NFR BLD AUTO: 39.2 % (ref 10–50)
MAGNESIUM SERPL-MCNC: 2.2 MG/DL (ref 1.6–2.3)
MCH RBC QN AUTO: 30.2 PG (ref 26.5–34)
MCHC RBC AUTO-ENTMCNC: 35.1 G/DL (ref 31.5–36.3)
MCV RBC AUTO: 86 FL (ref 80–98)
MONOCYTES # BLD AUTO: 1.3 10*3/MM3 (ref 0–0.9)
MONOCYTES NFR BLD AUTO: 10.4 % (ref 0–12)
NEUTROPHILS # BLD AUTO: 5.91 10*3/MM3 (ref 2–8.6)
NEUTROPHILS NFR BLD AUTO: 47.3 % (ref 37–80)
NT-PROBNP SERPL-MCNC: 1330 PG/ML (ref 0–900)
PLATELET # BLD AUTO: 175 10*3/MM3 (ref 150–450)
PMV BLD AUTO: 11.9 FL (ref 8–12)
POTASSIUM BLD-SCNC: 3.9 MMOL/L (ref 3.5–5.1)
PROT SERPL-MCNC: 7.9 G/DL (ref 6.3–8.6)
PROTHROMBIN TIME: 14.2 SECONDS (ref 11.1–15.3)
RBC # BLD AUTO: 5.63 10*6/MM3 (ref 4.37–5.74)
SODIUM BLD-SCNC: 139 MMOL/L (ref 137–145)
TROPONIN I SERPL-MCNC: 0.07 NG/ML
WBC NRBC COR # BLD: 12.51 10*3/MM3 (ref 3.2–9.8)
WHOLE BLOOD HOLD SPECIMEN: NORMAL
WHOLE BLOOD HOLD SPECIMEN: NORMAL

## 2017-08-29 PROCEDURE — 84484 ASSAY OF TROPONIN QUANT: CPT | Performed by: EMERGENCY MEDICINE

## 2017-08-29 PROCEDURE — 99223 1ST HOSP IP/OBS HIGH 75: CPT | Performed by: FAMILY MEDICINE

## 2017-08-29 PROCEDURE — 25010000002 AMIODARONE IN DEXTROSE 5% 360-4.14 MG/200ML-% SOLUTION: Performed by: EMERGENCY MEDICINE

## 2017-08-29 PROCEDURE — 63710000001 INSULIN ASPART PER 5 UNITS: Performed by: FAMILY MEDICINE

## 2017-08-29 PROCEDURE — 85610 PROTHROMBIN TIME: CPT | Performed by: EMERGENCY MEDICINE

## 2017-08-29 PROCEDURE — 99285 EMERGENCY DEPT VISIT HI MDM: CPT

## 2017-08-29 PROCEDURE — 82553 CREATINE MB FRACTION: CPT | Performed by: EMERGENCY MEDICINE

## 2017-08-29 PROCEDURE — 25010000002 LORAZEPAM PER 2 MG: Performed by: EMERGENCY MEDICINE

## 2017-08-29 PROCEDURE — 82550 ASSAY OF CK (CPK): CPT | Performed by: EMERGENCY MEDICINE

## 2017-08-29 PROCEDURE — 85730 THROMBOPLASTIN TIME PARTIAL: CPT | Performed by: EMERGENCY MEDICINE

## 2017-08-29 PROCEDURE — 25010000002 AMIODARONE PER 30 MG: Performed by: EMERGENCY MEDICINE

## 2017-08-29 PROCEDURE — 80053 COMPREHEN METABOLIC PANEL: CPT | Performed by: EMERGENCY MEDICINE

## 2017-08-29 PROCEDURE — 71010 HC CHEST PA OR AP: CPT

## 2017-08-29 PROCEDURE — 83880 ASSAY OF NATRIURETIC PEPTIDE: CPT | Performed by: EMERGENCY MEDICINE

## 2017-08-29 PROCEDURE — 93010 ELECTROCARDIOGRAM REPORT: CPT | Performed by: INTERNAL MEDICINE

## 2017-08-29 PROCEDURE — 93005 ELECTROCARDIOGRAM TRACING: CPT

## 2017-08-29 PROCEDURE — 85025 COMPLETE CBC W/AUTO DIFF WBC: CPT | Performed by: EMERGENCY MEDICINE

## 2017-08-29 PROCEDURE — 83735 ASSAY OF MAGNESIUM: CPT | Performed by: EMERGENCY MEDICINE

## 2017-08-29 PROCEDURE — 63710000001 INSULIN REGULAR HUMAN PER 5 UNITS: Performed by: EMERGENCY MEDICINE

## 2017-08-29 PROCEDURE — 82962 GLUCOSE BLOOD TEST: CPT

## 2017-08-29 PROCEDURE — 93005 ELECTROCARDIOGRAM TRACING: CPT | Performed by: EMERGENCY MEDICINE

## 2017-08-29 RX ORDER — SODIUM CHLORIDE 9 MG/ML
50 INJECTION, SOLUTION INTRAVENOUS CONTINUOUS
Status: DISCONTINUED | OUTPATIENT
Start: 2017-08-29 | End: 2017-08-31

## 2017-08-29 RX ORDER — DEXTROSE MONOHYDRATE 25 G/50ML
25 INJECTION, SOLUTION INTRAVENOUS
Status: DISCONTINUED | OUTPATIENT
Start: 2017-08-29 | End: 2017-09-01 | Stop reason: HOSPADM

## 2017-08-29 RX ORDER — ACETAMINOPHEN 325 MG/1
650 TABLET ORAL EVERY 4 HOURS PRN
Status: DISCONTINUED | OUTPATIENT
Start: 2017-08-29 | End: 2017-09-01 | Stop reason: HOSPADM

## 2017-08-29 RX ORDER — MORPHINE SULFATE 4 MG/ML
1 INJECTION, SOLUTION INTRAMUSCULAR; INTRAVENOUS EVERY 4 HOURS PRN
Status: DISCONTINUED | OUTPATIENT
Start: 2017-08-29 | End: 2017-09-01 | Stop reason: HOSPADM

## 2017-08-29 RX ORDER — SODIUM CHLORIDE 9 MG/ML
75 INJECTION, SOLUTION INTRAVENOUS CONTINUOUS
Status: DISCONTINUED | OUTPATIENT
Start: 2017-08-29 | End: 2017-08-29 | Stop reason: ALTCHOICE

## 2017-08-29 RX ORDER — SODIUM CHLORIDE 0.9 % (FLUSH) 0.9 %
1-10 SYRINGE (ML) INJECTION AS NEEDED
Status: DISCONTINUED | OUTPATIENT
Start: 2017-08-29 | End: 2017-09-01 | Stop reason: HOSPADM

## 2017-08-29 RX ORDER — CARVEDILOL 6.25 MG/1
6.25 TABLET ORAL 2 TIMES DAILY WITH MEALS
Status: DISCONTINUED | OUTPATIENT
Start: 2017-08-29 | End: 2017-09-01 | Stop reason: HOSPADM

## 2017-08-29 RX ORDER — ASPIRIN 81 MG/1
81 TABLET ORAL DAILY
Status: DISCONTINUED | OUTPATIENT
Start: 2017-08-30 | End: 2017-09-01 | Stop reason: HOSPADM

## 2017-08-29 RX ORDER — BISACODYL 5 MG/1
5 TABLET, DELAYED RELEASE ORAL DAILY PRN
Status: DISCONTINUED | OUTPATIENT
Start: 2017-08-29 | End: 2017-09-01 | Stop reason: HOSPADM

## 2017-08-29 RX ORDER — FAMOTIDINE 40 MG/1
40 TABLET, FILM COATED ORAL DAILY
Status: DISCONTINUED | OUTPATIENT
Start: 2017-08-30 | End: 2017-09-01 | Stop reason: HOSPADM

## 2017-08-29 RX ORDER — SODIUM CHLORIDE 0.9 % (FLUSH) 0.9 %
10 SYRINGE (ML) INJECTION AS NEEDED
Status: DISCONTINUED | OUTPATIENT
Start: 2017-08-29 | End: 2017-09-01 | Stop reason: HOSPADM

## 2017-08-29 RX ORDER — CLOPIDOGREL BISULFATE 75 MG/1
75 TABLET ORAL DAILY
Status: DISCONTINUED | OUTPATIENT
Start: 2017-08-30 | End: 2017-09-01 | Stop reason: HOSPADM

## 2017-08-29 RX ORDER — ATORVASTATIN CALCIUM 40 MG/1
80 TABLET, FILM COATED ORAL NIGHTLY
Status: DISCONTINUED | OUTPATIENT
Start: 2017-08-30 | End: 2017-09-01 | Stop reason: HOSPADM

## 2017-08-29 RX ORDER — AMIODARONE HYDROCHLORIDE 50 MG/ML
300 INJECTION, SOLUTION INTRAVENOUS ONCE
Status: COMPLETED | OUTPATIENT
Start: 2017-08-29 | End: 2017-08-29

## 2017-08-29 RX ORDER — ASPIRIN 325 MG
325 TABLET ORAL ONCE
Status: COMPLETED | OUTPATIENT
Start: 2017-08-29 | End: 2017-08-29

## 2017-08-29 RX ORDER — VALSARTAN 40 MG/1
40 TABLET ORAL DAILY
Status: DISCONTINUED | OUTPATIENT
Start: 2017-08-30 | End: 2017-09-01 | Stop reason: HOSPADM

## 2017-08-29 RX ORDER — METFORMIN HYDROCHLORIDE 500 MG/1
1000 TABLET, EXTENDED RELEASE ORAL
Status: DISCONTINUED | OUTPATIENT
Start: 2017-08-30 | End: 2017-08-30

## 2017-08-29 RX ORDER — NALOXONE HCL 0.4 MG/ML
0.4 VIAL (ML) INJECTION
Status: DISCONTINUED | OUTPATIENT
Start: 2017-08-29 | End: 2017-09-01 | Stop reason: HOSPADM

## 2017-08-29 RX ORDER — LORAZEPAM 2 MG/ML
1 INJECTION INTRAMUSCULAR ONCE
Status: COMPLETED | OUTPATIENT
Start: 2017-08-29 | End: 2017-08-29

## 2017-08-29 RX ORDER — NICOTINE POLACRILEX 4 MG
15 LOZENGE BUCCAL
Status: DISCONTINUED | OUTPATIENT
Start: 2017-08-29 | End: 2017-09-01 | Stop reason: HOSPADM

## 2017-08-29 RX ORDER — CETIRIZINE HYDROCHLORIDE 10 MG/1
10 TABLET ORAL DAILY
Status: DISCONTINUED | OUTPATIENT
Start: 2017-08-30 | End: 2017-09-01 | Stop reason: HOSPADM

## 2017-08-29 RX ADMIN — INSULIN ASPART 6 UNITS: 100 INJECTION, SOLUTION INTRAVENOUS; SUBCUTANEOUS at 22:49

## 2017-08-29 RX ADMIN — AMIODARONE HYDROCHLORIDE 300 MG: 50 INJECTION, SOLUTION INTRAVENOUS at 19:50

## 2017-08-29 RX ADMIN — HUMAN INSULIN 2 UNITS: 100 INJECTION, SOLUTION SUBCUTANEOUS at 21:58

## 2017-08-29 RX ADMIN — LORAZEPAM 1 MG: 2 INJECTION INTRAMUSCULAR; INTRAVENOUS at 19:46

## 2017-08-29 RX ADMIN — AMIODARONE HYDROCHLORIDE 1 MG/MIN: 1.8 INJECTION, SOLUTION INTRAVENOUS at 20:15

## 2017-08-29 RX ADMIN — SODIUM CHLORIDE 75 ML/HR: 900 INJECTION, SOLUTION INTRAVENOUS at 19:50

## 2017-08-29 RX ADMIN — ASPIRIN 325 MG: 325 TABLET, COATED ORAL at 20:32

## 2017-08-30 ENCOUNTER — APPOINTMENT (OUTPATIENT)
Dept: CARDIOLOGY | Facility: HOSPITAL | Age: 62
End: 2017-08-30
Attending: FAMILY MEDICINE

## 2017-08-30 LAB
ALBUMIN SERPL-MCNC: 3.5 G/DL (ref 3.4–4.8)
ALBUMIN/GLOB SERPL: 1.2 G/DL (ref 1.1–1.8)
ALP SERPL-CCNC: 112 U/L (ref 38–126)
ALT SERPL W P-5'-P-CCNC: 59 U/L (ref 21–72)
ANION GAP SERPL CALCULATED.3IONS-SCNC: 12 MMOL/L (ref 5–15)
AST SERPL-CCNC: 51 U/L (ref 17–59)
BASOPHILS # BLD AUTO: 0.03 10*3/MM3 (ref 0–0.2)
BASOPHILS NFR BLD AUTO: 0.4 % (ref 0–2)
BH CV ECHO MEAS - ACS: 2 CM
BH CV ECHO MEAS - AI DEC SLOPE: 83.3 CM/SEC^2
BH CV ECHO MEAS - AI MAX PG: 17.6 MMHG
BH CV ECHO MEAS - AI MAX VEL: 209.6 CM/SEC
BH CV ECHO MEAS - AI P1/2T: 737 MSEC
BH CV ECHO MEAS - AO ISTHMUS: 1.9 CM
BH CV ECHO MEAS - AO MAX PG (FULL): 1.4 MMHG
BH CV ECHO MEAS - AO MAX PG: 3.2 MMHG
BH CV ECHO MEAS - AO MEAN PG (FULL): 0.82 MMHG
BH CV ECHO MEAS - AO MEAN PG: 1.6 MMHG
BH CV ECHO MEAS - AO ROOT AREA (BSA CORRECTED): 1.2
BH CV ECHO MEAS - AO ROOT AREA: 6.1 CM^2
BH CV ECHO MEAS - AO ROOT DIAM: 2.8 CM
BH CV ECHO MEAS - AO V2 MAX: 89 CM/SEC
BH CV ECHO MEAS - AO V2 MEAN: 58.2 CM/SEC
BH CV ECHO MEAS - AO V2 VTI: 12.9 CM
BH CV ECHO MEAS - ASC AORTA: 3.1 CM
BH CV ECHO MEAS - AVA(I,A): 2 CM^2
BH CV ECHO MEAS - AVA(I,D): 2 CM^2
BH CV ECHO MEAS - AVA(V,A): 2.2 CM^2
BH CV ECHO MEAS - AVA(V,D): 2.2 CM^2
BH CV ECHO MEAS - BSA(HAYCOCK): 2.4 M^2
BH CV ECHO MEAS - BSA: 2.3 M^2
BH CV ECHO MEAS - BZI_BMI: 31.1 KILOGRAMS/M^2
BH CV ECHO MEAS - BZI_METRIC_HEIGHT: 185.4 CM
BH CV ECHO MEAS - BZI_METRIC_WEIGHT: 107.1 KG
BH CV ECHO MEAS - EDV(CUBED): 392.3 ML
BH CV ECHO MEAS - EDV(TEICH): 282.5 ML
BH CV ECHO MEAS - EF(CUBED): 10.4 %
BH CV ECHO MEAS - EF(TEICH): 7.9 %
BH CV ECHO MEAS - ESV(CUBED): 351.7 ML
BH CV ECHO MEAS - ESV(TEICH): 260.3 ML
BH CV ECHO MEAS - FS: 3.6 %
BH CV ECHO MEAS - IVS/LVPW: 1.1
BH CV ECHO MEAS - IVSD: 0.67 CM
BH CV ECHO MEAS - LA DIMENSION: 4.6 CM
BH CV ECHO MEAS - LA/AO: 1.7
BH CV ECHO MEAS - LV MASS(C)D: 204.9 GRAMS
BH CV ECHO MEAS - LV MASS(C)DI: 88.7 GRAMS/M^2
BH CV ECHO MEAS - LV MAX PG: 1.8 MMHG
BH CV ECHO MEAS - LV MEAN PG: 0.76 MMHG
BH CV ECHO MEAS - LV V1 MAX: 67.3 CM/SEC
BH CV ECHO MEAS - LV V1 MEAN: 40 CM/SEC
BH CV ECHO MEAS - LV V1 VTI: 8.9 CM
BH CV ECHO MEAS - LVIDD: 7.3 CM
BH CV ECHO MEAS - LVIDS: 7.1 CM
BH CV ECHO MEAS - LVOT AREA (M): 2.8 CM^2
BH CV ECHO MEAS - LVOT AREA: 2.9 CM^2
BH CV ECHO MEAS - LVOT DIAM: 1.9 CM
BH CV ECHO MEAS - LVPWD: 0.61 CM
BH CV ECHO MEAS - MR MAX PG: 33.2 MMHG
BH CV ECHO MEAS - MR MAX VEL: 288.1 CM/SEC
BH CV ECHO MEAS - MV A MAX VEL: 47 CM/SEC
BH CV ECHO MEAS - MV DEC SLOPE: 820.5 CM/SEC^2
BH CV ECHO MEAS - MV E MAX VEL: 113.8 CM/SEC
BH CV ECHO MEAS - MV E/A: 2.4
BH CV ECHO MEAS - MV MAX PG: 6.3 MMHG
BH CV ECHO MEAS - MV MEAN PG: 1.8 MMHG
BH CV ECHO MEAS - MV P1/2T MAX VEL: 124.2 CM/SEC
BH CV ECHO MEAS - MV P1/2T: 44.3 MSEC
BH CV ECHO MEAS - MV V2 MAX: 126 CM/SEC
BH CV ECHO MEAS - MV V2 MEAN: 60.5 CM/SEC
BH CV ECHO MEAS - MV V2 VTI: 21.7 CM
BH CV ECHO MEAS - MVA P1/2T LCG: 1.8 CM^2
BH CV ECHO MEAS - MVA(P1/2T): 5 CM^2
BH CV ECHO MEAS - MVA(VTI): 1.2 CM^2
BH CV ECHO MEAS - PA MAX PG: 2.4 MMHG
BH CV ECHO MEAS - PA V2 MAX: 78.2 CM/SEC
BH CV ECHO MEAS - PI END-D VEL: 170.5 CM/SEC
BH CV ECHO MEAS - RVDD: 2.6 CM
BH CV ECHO MEAS - SI(AO): 33.9 ML/M^2
BH CV ECHO MEAS - SI(CUBED): 17.6 ML/M^2
BH CV ECHO MEAS - SI(LVOT): 11.1 ML/M^2
BH CV ECHO MEAS - SI(TEICH): 9.6 ML/M^2
BH CV ECHO MEAS - SV(AO): 78.2 ML
BH CV ECHO MEAS - SV(CUBED): 40.6 ML
BH CV ECHO MEAS - SV(LVOT): 25.6 ML
BH CV ECHO MEAS - SV(TEICH): 22.2 ML
BH CV ECHO MEAS - TR MAX VEL: 219.9 CM/SEC
BILIRUB SERPL-MCNC: 1.7 MG/DL (ref 0.2–1.3)
BUN BLD-MCNC: 19 MG/DL (ref 7–21)
BUN/CREAT SERPL: 22.6 (ref 7–25)
CALCIUM SPEC-SCNC: 8.9 MG/DL (ref 8.4–10.2)
CHLORIDE SERPL-SCNC: 109 MMOL/L (ref 95–110)
CO2 SERPL-SCNC: 19 MMOL/L (ref 22–31)
CREAT BLD-MCNC: 0.84 MG/DL (ref 0.7–1.3)
DEPRECATED RDW RBC AUTO: 39 FL (ref 35.1–43.9)
EOSINOPHIL # BLD AUTO: 0.13 10*3/MM3 (ref 0–0.7)
EOSINOPHIL NFR BLD AUTO: 1.8 % (ref 0–7)
ERYTHROCYTE [DISTWIDTH] IN BLOOD BY AUTOMATED COUNT: 12.6 % (ref 11.5–14.5)
GFR SERPL CREATININE-BSD FRML MDRD: 93 ML/MIN/1.73 (ref 60–113)
GLOBULIN UR ELPH-MCNC: 3 GM/DL (ref 2.3–3.5)
GLUCOSE BLD-MCNC: 178 MG/DL (ref 60–100)
GLUCOSE BLDC GLUCOMTR-MCNC: 155 MG/DL (ref 70–130)
GLUCOSE BLDC GLUCOMTR-MCNC: 186 MG/DL (ref 70–130)
GLUCOSE BLDC GLUCOMTR-MCNC: 216 MG/DL (ref 70–130)
GLUCOSE BLDC GLUCOMTR-MCNC: 251 MG/DL (ref 70–130)
HCT VFR BLD AUTO: 43.7 % (ref 39–49)
HGB BLD-MCNC: 15.3 G/DL (ref 13.7–17.3)
IMM GRANULOCYTES # BLD: 0.02 10*3/MM3 (ref 0–0.02)
IMM GRANULOCYTES NFR BLD: 0.3 % (ref 0–0.5)
LYMPHOCYTES # BLD AUTO: 1.98 10*3/MM3 (ref 0.6–4.2)
LYMPHOCYTES NFR BLD AUTO: 27.5 % (ref 10–50)
MCH RBC QN AUTO: 29.8 PG (ref 26.5–34)
MCHC RBC AUTO-ENTMCNC: 35 G/DL (ref 31.5–36.3)
MCV RBC AUTO: 85.2 FL (ref 80–98)
MONOCYTES # BLD AUTO: 0.73 10*3/MM3 (ref 0–0.9)
MONOCYTES NFR BLD AUTO: 10.1 % (ref 0–12)
NEUTROPHILS # BLD AUTO: 4.32 10*3/MM3 (ref 2–8.6)
NEUTROPHILS NFR BLD AUTO: 59.9 % (ref 37–80)
NRBC BLD MANUAL-RTO: 0 /100 WBC (ref 0–0)
PLATELET # BLD AUTO: 129 10*3/MM3 (ref 150–450)
PMV BLD AUTO: 11.9 FL (ref 8–12)
POTASSIUM BLD-SCNC: 3.7 MMOL/L (ref 3.5–5.1)
PROT SERPL-MCNC: 6.5 G/DL (ref 6.3–8.6)
RBC # BLD AUTO: 5.13 10*6/MM3 (ref 4.37–5.74)
SODIUM BLD-SCNC: 140 MMOL/L (ref 137–145)
TROPONIN I SERPL-MCNC: 1.79 NG/ML
TROPONIN I SERPL-MCNC: 2.21 NG/ML
TROPONIN I SERPL-MCNC: 2.93 NG/ML
WBC NRBC COR # BLD: 7.21 10*3/MM3 (ref 3.2–9.8)

## 2017-08-30 PROCEDURE — 82962 GLUCOSE BLOOD TEST: CPT

## 2017-08-30 PROCEDURE — 80053 COMPREHEN METABOLIC PANEL: CPT | Performed by: FAMILY MEDICINE

## 2017-08-30 PROCEDURE — 85025 COMPLETE CBC W/AUTO DIFF WBC: CPT | Performed by: FAMILY MEDICINE

## 2017-08-30 PROCEDURE — 84484 ASSAY OF TROPONIN QUANT: CPT | Performed by: EMERGENCY MEDICINE

## 2017-08-30 PROCEDURE — 94799 UNLISTED PULMONARY SVC/PX: CPT

## 2017-08-30 PROCEDURE — C8929 TTE W OR WO FOL WCON,DOPPLER: HCPCS

## 2017-08-30 PROCEDURE — 63710000001 INSULIN ASPART PER 5 UNITS: Performed by: FAMILY MEDICINE

## 2017-08-30 PROCEDURE — 94760 N-INVAS EAR/PLS OXIMETRY 1: CPT

## 2017-08-30 PROCEDURE — 25010000002 AMIODARONE IN DEXTROSE 5% 360-4.14 MG/200ML-% SOLUTION: Performed by: STUDENT IN AN ORGANIZED HEALTH CARE EDUCATION/TRAINING PROGRAM

## 2017-08-30 PROCEDURE — 63710000001 DIPHENHYDRAMINE PER 50 MG: Performed by: INTERNAL MEDICINE

## 2017-08-30 PROCEDURE — 93306 TTE W/DOPPLER COMPLETE: CPT

## 2017-08-30 PROCEDURE — 25010000002 ENOXAPARIN PER 10 MG: Performed by: FAMILY MEDICINE

## 2017-08-30 PROCEDURE — 25010000002 AMIODARONE IN DEXTROSE 5% 360-4.14 MG/200ML-% SOLUTION: Performed by: EMERGENCY MEDICINE

## 2017-08-30 RX ORDER — HYDRALAZINE HYDROCHLORIDE 20 MG/ML
10 INJECTION INTRAMUSCULAR; INTRAVENOUS EVERY 6 HOURS PRN
Status: DISCONTINUED | OUTPATIENT
Start: 2017-08-30 | End: 2017-09-01 | Stop reason: HOSPADM

## 2017-08-30 RX ORDER — POTASSIUM CHLORIDE 1.5 G/1.77G
40 POWDER, FOR SOLUTION ORAL AS NEEDED
Status: DISCONTINUED | OUTPATIENT
Start: 2017-08-30 | End: 2017-09-01 | Stop reason: HOSPADM

## 2017-08-30 RX ORDER — POTASSIUM CHLORIDE 750 MG/1
40 CAPSULE, EXTENDED RELEASE ORAL AS NEEDED
Status: DISCONTINUED | OUTPATIENT
Start: 2017-08-30 | End: 2017-09-01 | Stop reason: HOSPADM

## 2017-08-30 RX ORDER — POTASSIUM CHLORIDE 7.45 MG/ML
10 INJECTION INTRAVENOUS
Status: DISCONTINUED | OUTPATIENT
Start: 2017-08-30 | End: 2017-09-01 | Stop reason: HOSPADM

## 2017-08-30 RX ORDER — DIPHENHYDRAMINE HCL 25 MG
25 CAPSULE ORAL NIGHTLY PRN
Status: DISCONTINUED | OUTPATIENT
Start: 2017-08-30 | End: 2017-08-31

## 2017-08-30 RX ADMIN — FAMOTIDINE 40 MG: 40 TABLET ORAL at 09:07

## 2017-08-30 RX ADMIN — INSULIN ASPART 4 UNITS: 100 INJECTION, SOLUTION INTRAVENOUS; SUBCUTANEOUS at 21:04

## 2017-08-30 RX ADMIN — AMIODARONE HYDROCHLORIDE 1 MG/MIN: 1.8 INJECTION, SOLUTION INTRAVENOUS at 07:54

## 2017-08-30 RX ADMIN — MAGNESIUM OXIDE TAB 400 MG (241.3 MG ELEMENTAL MG) 400 MG: 400 (241.3 MG) TAB at 09:07

## 2017-08-30 RX ADMIN — ENOXAPARIN SODIUM 40 MG: 40 INJECTION SUBCUTANEOUS at 09:08

## 2017-08-30 RX ADMIN — METFORMIN HYDROCHLORIDE 1000 MG: 500 TABLET, EXTENDED RELEASE ORAL at 09:52

## 2017-08-30 RX ADMIN — POTASSIUM CHLORIDE 40 MEQ: 750 CAPSULE, EXTENDED RELEASE ORAL at 03:35

## 2017-08-30 RX ADMIN — CARVEDILOL 6.25 MG: 6.25 TABLET, FILM COATED ORAL at 21:04

## 2017-08-30 RX ADMIN — CARVEDILOL 6.25 MG: 6.25 TABLET, FILM COATED ORAL at 09:07

## 2017-08-30 RX ADMIN — DIPHENHYDRAMINE HYDROCHLORIDE 25 MG: 25 CAPSULE ORAL at 23:57

## 2017-08-30 RX ADMIN — INSULIN ASPART 6 UNITS: 100 INJECTION, SOLUTION INTRAVENOUS; SUBCUTANEOUS at 12:17

## 2017-08-30 RX ADMIN — MAGNESIUM OXIDE TAB 400 MG (241.3 MG ELEMENTAL MG) 400 MG: 400 (241.3 MG) TAB at 21:04

## 2017-08-30 RX ADMIN — CETIRIZINE HYDROCHLORIDE 10 MG: 10 TABLET, FILM COATED ORAL at 09:45

## 2017-08-30 RX ADMIN — CLOPIDOGREL BISULFATE 75 MG: 75 TABLET ORAL at 09:07

## 2017-08-30 RX ADMIN — AMIODARONE HYDROCHLORIDE 1 MG/MIN: 1.8 INJECTION, SOLUTION INTRAVENOUS at 02:03

## 2017-08-30 RX ADMIN — SODIUM CHLORIDE 50 ML/HR: 9 INJECTION, SOLUTION INTRAVENOUS at 07:55

## 2017-08-30 RX ADMIN — AMIODARONE HYDROCHLORIDE 0.5 MG/MIN: 1.8 INJECTION, SOLUTION INTRAVENOUS at 21:08

## 2017-08-30 RX ADMIN — ASPIRIN 81 MG: 81 TABLET ORAL at 09:07

## 2017-08-30 RX ADMIN — VALSARTAN 40 MG: 40 TABLET, FILM COATED ORAL at 09:07

## 2017-08-30 RX ADMIN — INSULIN ASPART 2 UNITS: 100 INJECTION, SOLUTION INTRAVENOUS; SUBCUTANEOUS at 06:30

## 2017-08-30 RX ADMIN — AMIODARONE HYDROCHLORIDE 1 MG/MIN: 1.8 INJECTION, SOLUTION INTRAVENOUS at 13:26

## 2017-08-30 RX ADMIN — ATORVASTATIN CALCIUM 80 MG: 40 TABLET, FILM COATED ORAL at 21:04

## 2017-08-31 PROBLEM — G47.33 OSA (OBSTRUCTIVE SLEEP APNEA): Status: ACTIVE | Noted: 2017-08-31

## 2017-08-31 LAB
ALBUMIN SERPL-MCNC: 3.4 G/DL (ref 3.4–4.8)
ALBUMIN/GLOB SERPL: 1.1 G/DL (ref 1.1–1.8)
ALP SERPL-CCNC: 81 U/L (ref 38–126)
ALT SERPL W P-5'-P-CCNC: 47 U/L (ref 21–72)
ANION GAP SERPL CALCULATED.3IONS-SCNC: 12 MMOL/L (ref 5–15)
AST SERPL-CCNC: 27 U/L (ref 17–59)
BASOPHILS # BLD AUTO: 0.04 10*3/MM3 (ref 0–0.2)
BASOPHILS NFR BLD AUTO: 0.4 % (ref 0–2)
BILIRUB SERPL-MCNC: 2.3 MG/DL (ref 0.2–1.3)
BUN BLD-MCNC: 17 MG/DL (ref 7–21)
BUN/CREAT SERPL: 22.7 (ref 7–25)
CALCIUM SPEC-SCNC: 8.2 MG/DL (ref 8.4–10.2)
CHLORIDE SERPL-SCNC: 110 MMOL/L (ref 95–110)
CO2 SERPL-SCNC: 18 MMOL/L (ref 22–31)
CREAT BLD-MCNC: 0.75 MG/DL (ref 0.7–1.3)
DEPRECATED RDW RBC AUTO: 39.9 FL (ref 35.1–43.9)
EOSINOPHIL # BLD AUTO: 0.16 10*3/MM3 (ref 0–0.7)
EOSINOPHIL NFR BLD AUTO: 1.4 % (ref 0–7)
ERYTHROCYTE [DISTWIDTH] IN BLOOD BY AUTOMATED COUNT: 12.9 % (ref 11.5–14.5)
GFR SERPL CREATININE-BSD FRML MDRD: 106 ML/MIN/1.73 (ref 60–113)
GLOBULIN UR ELPH-MCNC: 3 GM/DL (ref 2.3–3.5)
GLUCOSE BLD-MCNC: 187 MG/DL (ref 60–100)
GLUCOSE BLDC GLUCOMTR-MCNC: 207 MG/DL (ref 70–130)
GLUCOSE BLDC GLUCOMTR-MCNC: 261 MG/DL (ref 70–130)
GLUCOSE BLDC GLUCOMTR-MCNC: 262 MG/DL (ref 70–130)
HCT VFR BLD AUTO: 44.7 % (ref 39–49)
HGB BLD-MCNC: 15.6 G/DL (ref 13.7–17.3)
IMM GRANULOCYTES # BLD: 0.02 10*3/MM3 (ref 0–0.02)
IMM GRANULOCYTES NFR BLD: 0.2 % (ref 0–0.5)
LYMPHOCYTES # BLD AUTO: 2.01 10*3/MM3 (ref 0.6–4.2)
LYMPHOCYTES NFR BLD AUTO: 18.1 % (ref 10–50)
MCH RBC QN AUTO: 29.8 PG (ref 26.5–34)
MCHC RBC AUTO-ENTMCNC: 34.9 G/DL (ref 31.5–36.3)
MCV RBC AUTO: 85.5 FL (ref 80–98)
MONOCYTES # BLD AUTO: 1.12 10*3/MM3 (ref 0–0.9)
MONOCYTES NFR BLD AUTO: 10.1 % (ref 0–12)
NEUTROPHILS # BLD AUTO: 7.76 10*3/MM3 (ref 2–8.6)
NEUTROPHILS NFR BLD AUTO: 69.8 % (ref 37–80)
PLATELET # BLD AUTO: 133 10*3/MM3 (ref 150–450)
PMV BLD AUTO: 12.2 FL (ref 8–12)
POTASSIUM BLD-SCNC: 3.9 MMOL/L (ref 3.5–5.1)
PROT SERPL-MCNC: 6.4 G/DL (ref 6.3–8.6)
RBC # BLD AUTO: 5.23 10*6/MM3 (ref 4.37–5.74)
SODIUM BLD-SCNC: 140 MMOL/L (ref 137–145)
WBC NRBC COR # BLD: 11.11 10*3/MM3 (ref 3.2–9.8)

## 2017-08-31 PROCEDURE — 82962 GLUCOSE BLOOD TEST: CPT

## 2017-08-31 PROCEDURE — 25010000002 MORPHINE PER 10 MG: Performed by: FAMILY MEDICINE

## 2017-08-31 PROCEDURE — G8988 SELF CARE GOAL STATUS: HCPCS

## 2017-08-31 PROCEDURE — 93010 ELECTROCARDIOGRAM REPORT: CPT | Performed by: INTERNAL MEDICINE

## 2017-08-31 PROCEDURE — 99232 SBSQ HOSP IP/OBS MODERATE 35: CPT | Performed by: FAMILY MEDICINE

## 2017-08-31 PROCEDURE — G8989 SELF CARE D/C STATUS: HCPCS

## 2017-08-31 PROCEDURE — 63710000001 INSULIN ASPART PER 5 UNITS: Performed by: FAMILY MEDICINE

## 2017-08-31 PROCEDURE — 25010000002 ENOXAPARIN PER 10 MG: Performed by: FAMILY MEDICINE

## 2017-08-31 PROCEDURE — 80053 COMPREHEN METABOLIC PANEL: CPT | Performed by: FAMILY MEDICINE

## 2017-08-31 PROCEDURE — 85025 COMPLETE CBC W/AUTO DIFF WBC: CPT | Performed by: FAMILY MEDICINE

## 2017-08-31 PROCEDURE — 99233 SBSQ HOSP IP/OBS HIGH 50: CPT | Performed by: INTERNAL MEDICINE

## 2017-08-31 PROCEDURE — 25010000002 AMIODARONE IN DEXTROSE 5% 150-4.21 MG/100ML-% SOLUTION: Performed by: INTERNAL MEDICINE

## 2017-08-31 PROCEDURE — G8987 SELF CARE CURRENT STATUS: HCPCS

## 2017-08-31 PROCEDURE — 25010000002 AMIODARONE IN DEXTROSE 5% 150-4.21 MG/100ML-% SOLUTION

## 2017-08-31 PROCEDURE — 97165 OT EVAL LOW COMPLEX 30 MIN: CPT

## 2017-08-31 PROCEDURE — G8979 MOBILITY GOAL STATUS: HCPCS

## 2017-08-31 PROCEDURE — 97162 PT EVAL MOD COMPLEX 30 MIN: CPT

## 2017-08-31 PROCEDURE — G8978 MOBILITY CURRENT STATUS: HCPCS

## 2017-08-31 PROCEDURE — 93005 ELECTROCARDIOGRAM TRACING: CPT | Performed by: INTERNAL MEDICINE

## 2017-08-31 PROCEDURE — 97116 GAIT TRAINING THERAPY: CPT

## 2017-08-31 RX ORDER — METOPROLOL TARTRATE 5 MG/5ML
5 INJECTION INTRAVENOUS ONCE
Status: COMPLETED | OUTPATIENT
Start: 2017-08-31 | End: 2017-08-31

## 2017-08-31 RX ORDER — AMIODARONE HYDROCHLORIDE 200 MG/1
400 TABLET ORAL EVERY 12 HOURS SCHEDULED
Status: DISCONTINUED | OUTPATIENT
Start: 2017-08-31 | End: 2017-09-01 | Stop reason: HOSPADM

## 2017-08-31 RX ORDER — AMIODARONE HYDROCHLORIDE 200 MG/1
200 TABLET ORAL
Status: DISCONTINUED | OUTPATIENT
Start: 2017-08-31 | End: 2017-08-31

## 2017-08-31 RX ORDER — AMIODARONE HYDROCHLORIDE 200 MG/1
200 TABLET ORAL EVERY 12 HOURS SCHEDULED
Status: DISCONTINUED | OUTPATIENT
Start: 2017-09-07 | End: 2017-09-01 | Stop reason: HOSPADM

## 2017-08-31 RX ORDER — DIPHENHYDRAMINE HCL 25 MG
25 CAPSULE ORAL NIGHTLY PRN
Status: DISCONTINUED | OUTPATIENT
Start: 2017-08-31 | End: 2017-08-31

## 2017-08-31 RX ORDER — AMIODARONE HYDROCHLORIDE 200 MG/1
200 TABLET ORAL
Status: DISCONTINUED | OUTPATIENT
Start: 2017-09-14 | End: 2017-09-01 | Stop reason: HOSPADM

## 2017-08-31 RX ORDER — LIDOCAINE HYDROCHLORIDE 20 MG/ML
5 INJECTION, SOLUTION INFILTRATION; PERINEURAL ONCE
Status: COMPLETED | OUTPATIENT
Start: 2017-08-31 | End: 2017-08-31

## 2017-08-31 RX ORDER — DIPHENHYDRAMINE HCL 25 MG
25 CAPSULE ORAL NIGHTLY PRN
Status: DISCONTINUED | OUTPATIENT
Start: 2017-08-31 | End: 2017-09-01 | Stop reason: HOSPADM

## 2017-08-31 RX ORDER — METOPROLOL TARTRATE 5 MG/5ML
INJECTION INTRAVENOUS
Status: DISPENSED
Start: 2017-08-31 | End: 2017-09-01

## 2017-08-31 RX ADMIN — AMIODARONE HYDROCHLORIDE 200 MG: 200 TABLET ORAL at 08:48

## 2017-08-31 RX ADMIN — ATORVASTATIN CALCIUM 80 MG: 40 TABLET, FILM COATED ORAL at 21:45

## 2017-08-31 RX ADMIN — MORPHINE SULFATE 1 MG: 4 INJECTION, SOLUTION INTRAMUSCULAR; INTRAVENOUS at 21:45

## 2017-08-31 RX ADMIN — INSULIN ASPART 6 UNITS: 100 INJECTION, SOLUTION INTRAVENOUS; SUBCUTANEOUS at 17:50

## 2017-08-31 RX ADMIN — Medication 10 ML: at 21:50

## 2017-08-31 RX ADMIN — METOPROLOL TARTRATE 5 MG: 5 INJECTION INTRAVENOUS at 19:00

## 2017-08-31 RX ADMIN — FAMOTIDINE 40 MG: 40 TABLET ORAL at 08:48

## 2017-08-31 RX ADMIN — MAGNESIUM OXIDE TAB 400 MG (241.3 MG ELEMENTAL MG) 400 MG: 400 (241.3 MG) TAB at 21:45

## 2017-08-31 RX ADMIN — VALSARTAN 40 MG: 40 TABLET, FILM COATED ORAL at 08:48

## 2017-08-31 RX ADMIN — ASPIRIN 81 MG: 81 TABLET ORAL at 08:48

## 2017-08-31 RX ADMIN — INSULIN ASPART 7 UNITS: 100 INJECTION, SOLUTION INTRAVENOUS; SUBCUTANEOUS at 21:56

## 2017-08-31 RX ADMIN — CLOPIDOGREL BISULFATE 75 MG: 75 TABLET ORAL at 08:48

## 2017-08-31 RX ADMIN — CARVEDILOL 6.25 MG: 6.25 TABLET, FILM COATED ORAL at 08:48

## 2017-08-31 RX ADMIN — ENOXAPARIN SODIUM 40 MG: 40 INJECTION SUBCUTANEOUS at 08:55

## 2017-08-31 RX ADMIN — LIDOCAINE HYDROCHLORIDE 5 ML: 20 INJECTION, SOLUTION INFILTRATION; PERINEURAL at 18:17

## 2017-08-31 RX ADMIN — AMIODARONE HYDROCHLORIDE: 1.5 INJECTION, SOLUTION INTRAVENOUS at 18:10

## 2017-08-31 RX ADMIN — AMIODARONE HYDROCHLORIDE 150 MG: 1.5 INJECTION, SOLUTION INTRAVENOUS at 18:25

## 2017-08-31 RX ADMIN — CETIRIZINE HYDROCHLORIDE 10 MG: 10 TABLET, FILM COATED ORAL at 08:47

## 2017-08-31 RX ADMIN — MAGNESIUM OXIDE TAB 400 MG (241.3 MG ELEMENTAL MG) 400 MG: 400 (241.3 MG) TAB at 08:48

## 2017-08-31 RX ADMIN — INSULIN ASPART 6 UNITS: 100 INJECTION, SOLUTION INTRAVENOUS; SUBCUTANEOUS at 11:56

## 2017-08-31 RX ADMIN — INSULIN ASPART 4 UNITS: 100 INJECTION, SOLUTION INTRAVENOUS; SUBCUTANEOUS at 09:01

## 2017-09-01 VITALS
DIASTOLIC BLOOD PRESSURE: 82 MMHG | BODY MASS INDEX: 31.71 KG/M2 | TEMPERATURE: 98 F | HEART RATE: 77 BPM | OXYGEN SATURATION: 96 % | WEIGHT: 239.25 LBS | SYSTOLIC BLOOD PRESSURE: 155 MMHG | RESPIRATION RATE: 20 BRPM | HEIGHT: 73 IN

## 2017-09-01 PROBLEM — I47.20 VENTRICULAR TACHYCARDIA: Status: RESOLVED | Noted: 2017-08-29 | Resolved: 2017-09-01

## 2017-09-01 LAB
ALBUMIN SERPL-MCNC: 3.5 G/DL (ref 3.4–4.8)
ALBUMIN/GLOB SERPL: 1.3 G/DL (ref 1.1–1.8)
ALP SERPL-CCNC: 82 U/L (ref 38–126)
ALT SERPL W P-5'-P-CCNC: 61 U/L (ref 21–72)
ANION GAP SERPL CALCULATED.3IONS-SCNC: 10 MMOL/L (ref 5–15)
AST SERPL-CCNC: 36 U/L (ref 17–59)
BASOPHILS # BLD AUTO: 0.03 10*3/MM3 (ref 0–0.2)
BASOPHILS NFR BLD AUTO: 0.3 % (ref 0–2)
BILIRUB SERPL-MCNC: 1.9 MG/DL (ref 0.2–1.3)
BUN BLD-MCNC: 17 MG/DL (ref 7–21)
BUN/CREAT SERPL: 23 (ref 7–25)
CALCIUM SPEC-SCNC: 8.4 MG/DL (ref 8.4–10.2)
CHLORIDE SERPL-SCNC: 109 MMOL/L (ref 95–110)
CO2 SERPL-SCNC: 20 MMOL/L (ref 22–31)
CREAT BLD-MCNC: 0.74 MG/DL (ref 0.7–1.3)
DEPRECATED RDW RBC AUTO: 39.1 FL (ref 35.1–43.9)
EOSINOPHIL # BLD AUTO: 0.2 10*3/MM3 (ref 0–0.7)
EOSINOPHIL NFR BLD AUTO: 2.2 % (ref 0–7)
ERYTHROCYTE [DISTWIDTH] IN BLOOD BY AUTOMATED COUNT: 12.9 % (ref 11.5–14.5)
GFR SERPL CREATININE-BSD FRML MDRD: 108 ML/MIN/1.73 (ref 49–113)
GLOBULIN UR ELPH-MCNC: 2.8 GM/DL (ref 2.3–3.5)
GLUCOSE BLD-MCNC: 165 MG/DL (ref 60–100)
GLUCOSE BLDC GLUCOMTR-MCNC: 250 MG/DL (ref 70–130)
GLUCOSE BLDC GLUCOMTR-MCNC: 313 MG/DL (ref 70–130)
HCT VFR BLD AUTO: 42.4 % (ref 39–49)
HGB BLD-MCNC: 15 G/DL (ref 13.7–17.3)
IMM GRANULOCYTES # BLD: 0.02 10*3/MM3 (ref 0–0.02)
IMM GRANULOCYTES NFR BLD: 0.2 % (ref 0–0.5)
LYMPHOCYTES # BLD AUTO: 2.04 10*3/MM3 (ref 0.6–4.2)
LYMPHOCYTES NFR BLD AUTO: 22.2 % (ref 10–50)
MAGNESIUM SERPL-MCNC: 2 MG/DL (ref 1.6–2.3)
MCH RBC QN AUTO: 29.9 PG (ref 26.5–34)
MCHC RBC AUTO-ENTMCNC: 35.4 G/DL (ref 31.5–36.3)
MCV RBC AUTO: 84.6 FL (ref 80–98)
MONOCYTES # BLD AUTO: 0.92 10*3/MM3 (ref 0–0.9)
MONOCYTES NFR BLD AUTO: 10 % (ref 0–12)
NEUTROPHILS # BLD AUTO: 5.97 10*3/MM3 (ref 2–8.6)
NEUTROPHILS NFR BLD AUTO: 65.1 % (ref 37–80)
PLATELET # BLD AUTO: 119 10*3/MM3 (ref 150–450)
PMV BLD AUTO: 11.6 FL (ref 8–12)
POTASSIUM BLD-SCNC: 3.8 MMOL/L (ref 3.5–5.1)
PROT SERPL-MCNC: 6.3 G/DL (ref 6.3–8.6)
RBC # BLD AUTO: 5.01 10*6/MM3 (ref 4.37–5.74)
SODIUM BLD-SCNC: 139 MMOL/L (ref 137–145)
WBC NRBC COR # BLD: 9.18 10*3/MM3 (ref 3.2–9.8)

## 2017-09-01 PROCEDURE — 82962 GLUCOSE BLOOD TEST: CPT

## 2017-09-01 PROCEDURE — 83735 ASSAY OF MAGNESIUM: CPT | Performed by: INTERNAL MEDICINE

## 2017-09-01 PROCEDURE — 94799 UNLISTED PULMONARY SVC/PX: CPT

## 2017-09-01 PROCEDURE — 94760 N-INVAS EAR/PLS OXIMETRY 1: CPT

## 2017-09-01 PROCEDURE — 25010000002 ENOXAPARIN PER 10 MG: Performed by: FAMILY MEDICINE

## 2017-09-01 PROCEDURE — 85025 COMPLETE CBC W/AUTO DIFF WBC: CPT | Performed by: FAMILY MEDICINE

## 2017-09-01 PROCEDURE — 80053 COMPREHEN METABOLIC PANEL: CPT | Performed by: FAMILY MEDICINE

## 2017-09-01 PROCEDURE — 99239 HOSP IP/OBS DSCHRG MGMT >30: CPT | Performed by: STUDENT IN AN ORGANIZED HEALTH CARE EDUCATION/TRAINING PROGRAM

## 2017-09-01 PROCEDURE — 25010000002 MORPHINE PER 10 MG: Performed by: FAMILY MEDICINE

## 2017-09-01 PROCEDURE — 63710000001 INSULIN ASPART PER 5 UNITS: Performed by: FAMILY MEDICINE

## 2017-09-01 PROCEDURE — 25010000002 AMIODARONE IN DEXTROSE 5% 360-4.14 MG/200ML-% SOLUTION: Performed by: INTERNAL MEDICINE

## 2017-09-01 RX ORDER — FAMOTIDINE 40 MG/1
40 TABLET, FILM COATED ORAL DAILY
Qty: 30 TABLET | Refills: 0
Start: 2017-09-01 | End: 2017-10-01

## 2017-09-01 RX ORDER — AMIODARONE HYDROCHLORIDE 400 MG/1
400 TABLET ORAL EVERY 12 HOURS SCHEDULED
Qty: 14 TABLET | Refills: 0
Start: 2017-09-01 | End: 2017-09-08

## 2017-09-01 RX ORDER — AMIODARONE HYDROCHLORIDE 200 MG/1
200 TABLET ORAL EVERY 12 HOURS SCHEDULED
Qty: 14 TABLET | Refills: 0
Start: 2017-09-07 | End: 2017-09-14

## 2017-09-01 RX ORDER — AMIODARONE HYDROCHLORIDE 200 MG/1
200 TABLET ORAL
Qty: 30 TABLET | Refills: 0
Start: 2017-09-14 | End: 2017-10-14

## 2017-09-01 RX ADMIN — CETIRIZINE HYDROCHLORIDE 10 MG: 10 TABLET, FILM COATED ORAL at 09:28

## 2017-09-01 RX ADMIN — MAGNESIUM OXIDE TAB 400 MG (241.3 MG ELEMENTAL MG) 400 MG: 400 (241.3 MG) TAB at 09:28

## 2017-09-01 RX ADMIN — INSULIN ASPART 6 UNITS: 100 INJECTION, SOLUTION INTRAVENOUS; SUBCUTANEOUS at 12:24

## 2017-09-01 RX ADMIN — VALSARTAN 40 MG: 40 TABLET, FILM COATED ORAL at 09:28

## 2017-09-01 RX ADMIN — ASPIRIN 81 MG: 81 TABLET ORAL at 09:28

## 2017-09-01 RX ADMIN — CLOPIDOGREL BISULFATE 75 MG: 75 TABLET ORAL at 09:29

## 2017-09-01 RX ADMIN — ENOXAPARIN SODIUM 40 MG: 40 INJECTION SUBCUTANEOUS at 09:28

## 2017-09-01 RX ADMIN — AMIODARONE HYDROCHLORIDE 0.5 MG/MIN: 1.8 INJECTION, SOLUTION INTRAVENOUS at 07:36

## 2017-09-01 RX ADMIN — Medication 12.5 MG: at 09:29

## 2017-09-01 RX ADMIN — MORPHINE SULFATE 1 MG: 4 INJECTION, SOLUTION INTRAMUSCULAR; INTRAVENOUS at 14:41

## 2017-09-01 RX ADMIN — FAMOTIDINE 40 MG: 40 TABLET ORAL at 09:29

## 2017-09-01 NOTE — PROGRESS NOTES
FAMILY MEDICINE DAILY PROGRESS NOTE  NAME: Chasity Xavier  : 1955  MRN: [id number]      LOS: 3 days     PROVIDER OF SERVICE: Raya Narvaez MD    Chief Complaint: Ventricular tachycardia    Subjective:     Interval History:  History taken from: patient and RN    Acute events overnight: The patient had a coughing episode while eating dinner last night.  His ICD fired 5 times.  He was given three 150mg IV doses of amiodarone, but the last one was stopped midway due to his low heart rate.  He was subsequently placed on a PO amiodarone regimen and transferred to the ICU.  After discussion with the patient and his wife, it was decided that he be transferred to [facility] in [place] today under the care of Dr. Nunez.  He is not complaining of any pain.  He is on 2 L of oxygen for comfort, and is saturating >95%. Dr. Santana discussed the possibility of an EP study with ablation if his ICD continues to misfire; the patient has decided to hold off at this time.    Review of Systems:   Review of Systems   Constitutional: Positive for fatigue. Negative for activity change, appetite change, chills and fever.   HENT: Negative for congestion, ear pain, rhinorrhea, sneezing and sore throat.    Eyes: Negative for discharge and visual disturbance.   Respiratory: Negative for cough, shortness of breath and wheezing.    Cardiovascular: Negative for chest pain ( chest wall soreness), palpitations and leg swelling.   Gastrointestinal: Negative for abdominal pain, blood in stool, constipation, diarrhea, nausea and vomiting.   Genitourinary: Negative for difficulty urinating, dysuria and hematuria.   Musculoskeletal: Negative for joint swelling and myalgias.   Skin: Negative for rash and wound.   Neurological: Negative for dizziness, syncope, weakness, light-headedness, numbness and headaches.   Hematological: Does not bruise/bleed easily.   Psychiatric/Behavioral: Negative for confusion, decreased  concentration and sleep disturbance.       Objective:     Vital Signs  Temp:  [97.7 °F (36.5 °C)-98.6 °F (37 °C)] 98.6 °F (37 °C)  Heart Rate:  [] 62  Resp:  [16-20] 20  BP: (111-167)/() 126/75  Body mass index is 31.57 kg/(m^2).    Physical Exam  Physical Exam   Constitutional: He is oriented to person, place, and time. He appears well-developed and well-nourished. No distress.   HENT:   Head: Normocephalic and atraumatic.   Nose: Nose normal.   Eyes: Conjunctivae and EOM are normal. Right eye exhibits no discharge. Left eye exhibits no discharge. No scleral icterus.   Neck: Normal range of motion. No JVD present. No thyromegaly present.   Cardiovascular: Normal rate, regular rhythm, normal heart sounds and intact distal pulses.    No murmur heard.  Pulmonary/Chest: Effort normal and breath sounds normal. No respiratory distress. He has no wheezes. He has no rales.   Abdominal: Soft. Bowel sounds are normal. He exhibits no distension and no mass. There is no tenderness. There is no rebound and no guarding.   Musculoskeletal: He exhibits no edema.   Neurological: He is alert and oriented to person, place, and time.   Skin: Skin is warm. No rash noted. He is not diaphoretic. No erythema.   Psychiatric: He has a normal mood and affect. His behavior is normal. Thought content normal.       Medication Review    Current Facility-Administered Medications:   •  acetaminophen (TYLENOL) tablet 650 mg, 650 mg, Oral, Q4H PRN, Noemi Caba MD  •  amiodarone (NEXTERONE) 360 mg/200 mL (1.8 mg/mL) infusion, 1 mg/min, Intravenous, Continuous, Annalise Santana MD, Last Rate: 16.67 mL/hr at 09/01/17 0736, 0.5 mg/min at 09/01/17 0736  •  [START ON 9/7/2017] amiodarone (PACERONE) tablet 200 mg, 200 mg, Oral, Q12H, Noemi Caba MD  •  [START ON 9/14/2017] amiodarone (PACERONE) tablet 200 mg, 200 mg, Oral, Q24H, Noemi Caba MD  •  amiodarone (PACERONE) tablet 400 mg, 400 mg, Oral, Q12H, Noemi Caba MD  •   amiodarone in dextrose 5% (NEXTERONE) loading dose 150mg/100mL, 150 mg, Intravenous, Once, Annalise Santana MD, 150 mg at 08/31/17 1846  •  aspirin EC tablet 81 mg, 81 mg, Oral, Daily, Noemi Caba MD, 81 mg at 08/31/17 0848  •  atorvastatin (LIPITOR) tablet 80 mg, 80 mg, Oral, Nightly, Noemi Caba MD, 80 mg at 08/31/17 2145  •  bisacodyl (DULCOLAX) EC tablet 5 mg, 5 mg, Oral, Daily PRN, Noemi Caba MD  •  carvedilol (COREG) tablet 6.25 mg, 6.25 mg, Oral, BID With Meals, Noemi Caba MD, 6.25 mg at 08/31/17 0848  •  cetirizine (zyrTEC) tablet 10 mg, 10 mg, Oral, Daily, Noemi Caba MD, 10 mg at 08/31/17 0847  •  clopidogrel (PLAVIX) tablet 75 mg, 75 mg, Oral, Daily, Noemi Caba MD, 75 mg at 08/31/17 0848  •  dextrose (D50W) solution 25 g, 25 g, Intravenous, Q15 Min PRN, Noemi Caba MD  •  dextrose (GLUTOSE) oral gel 15 g, 15 g, Oral, Q15 Min PRN, Noemi Caba MD  •  diphenhydrAMINE (BENADRYL) capsule 25 mg, 25 mg, Oral, Nightly PRN, Raya Narvaez MD  •  enoxaparin (LOVENOX) syringe 40 mg, 40 mg, Subcutaneous, Daily, Noemi Caba MD, 40 mg at 08/31/17 0855  •  famotidine (PEPCID) tablet 40 mg, 40 mg, Oral, Daily, Noemi Caba MD, 40 mg at 08/31/17 0848  •  glucagon (human recombinant) (GLUCAGEN DIAGNOSTIC) injection 1 mg, 1 mg, Subcutaneous, Q15 Min PRN, Noemi Caba MD  •  hydrALAZINE (APRESOLINE) injection 10 mg, 10 mg, Intravenous, Q6H PRN, Raya Narvaez MD  •  insulin aspart (novoLOG) injection 0-9 Units, 0-9 Units, Subcutaneous, 4x Daily AC & at Bedtime, Noemi Caba MD, 7 Units at 08/31/17 2156  •  magnesium oxide (MAGOX) tablet 400 mg, 400 mg, Oral, BID, Noemi Caba MD, 400 mg at 08/31/17 2145  •  Morphine sulfate (PF) injection 1 mg, 1 mg, Intravenous, Q4H PRN, 1 mg at 08/31/17 2145 **AND** naloxone (NARCAN) injection 0.4 mg, 0.4 mg, Intravenous, Q5 Min PRN, Noemi Caba MD  •  potassium chloride (MICRO-K) CR capsule 40 mEq, 40  mEq, Oral, PRN, 40 mEq at 08/30/17 0335 **OR** potassium chloride (KLOR-CON) packet 40 mEq, 40 mEq, Oral, PRN **OR** potassium chloride 10 mEq in 100 mL IVPB, 10 mEq, Intravenous, Q1H PRN, Vik Mix MD  •  sodium chloride 0.9 % flush 1-10 mL, 1-10 mL, Intravenous, PRN, Noemi Caba MD  •  sodium chloride 0.9 % flush 1-10 mL, 1-10 mL, Intravenous, PRN, Noemi Caba MD, 10 mL at 08/31/17 2150  •  sodium chloride 0.9 % flush 10 mL, 10 mL, Intravenous, PRN, Cliff Whitten MD  •  spironolactone (ALDACTONE) half tablet 12.5 mg, 12.5 mg, Oral, Daily, Raya Narvaez MD  •  valsartan (DIOVAN) tablet 40 mg, 40 mg, Oral, Daily, Noemi Caba MD, 40 mg at 08/31/17 0848     Diagnostic Data    Lab Results (last 24 hours)     Procedure Component Value Units Date/Time    POC Glucose Fingerstick [[id number]]  (Abnormal) Collected:  08/31/17 1152    Specimen:  Blood Updated:  08/31/17 1231     Glucose 261 (H) mg/dL       Sliding Scale AdminMeter: [id number]: [id number] JANELL BRANA       POC Glucose Fingerstick [[id number]]  (Abnormal) Collected:  08/31/17 1652    Specimen:  Blood Updated:  08/31/17 1737     Glucose 262 (H) mg/dL       RN NotifiedMeter: [id number]: [id number] MAGDA CANAS       POC Glucose Fingerstick [[id number]]  (Abnormal) Collected:  08/31/17 2122    Specimen:  Blood Updated:  09/01/17 0347     Glucose 313 (H) mg/dL       Meter: [id number]: [id number] AMIRA FISCHER       CBC & Differential [[id number]] Collected:  09/01/17 0439    Specimen:  Blood Updated:  09/01/17 0454    Narrative:       The following orders were created for panel order CBC & Differential.  Procedure                               Abnormality         Status                     ---------                               -----------         ------                     CBC Auto Differential[[id number]]        Abnormal            Final result                 Please view results for these tests  on the individual orders.    CBC Auto Differential [[id number]]  (Abnormal) Collected:  09/01/17 0439    Specimen:  Blood Updated:  09/01/17 0454     WBC 9.18 10*3/mm3      RBC 5.01 10*6/mm3      Hemoglobin 15.0 g/dL      Hematocrit 42.4 %      MCV 84.6 fL      MCH 29.9 pg      MCHC 35.4 g/dL      RDW 12.9 %      RDW-SD 39.1 fl      MPV 11.6 fL      Platelets 119 (L) 10*3/mm3      Neutrophil % 65.1 %      Lymphocyte % 22.2 %      Monocyte % 10.0 %      Eosinophil % 2.2 %      Basophil % 0.3 %      Immature Grans % 0.2 %      Neutrophils, Absolute 5.97 10*3/mm3      Lymphocytes, Absolute 2.04 10*3/mm3      Monocytes, Absolute 0.92 (H) 10*3/mm3      Eosinophils, Absolute 0.20 10*3/mm3      Basophils, Absolute 0.03 10*3/mm3      Immature Grans, Absolute 0.02 10*3/mm3     Comprehensive Metabolic Panel [[id number]]  (Abnormal) Collected:  09/01/17 0439    Specimen:  Blood Updated:  09/01/17 0509     Glucose 165 (H) mg/dL      BUN 17 mg/dL      Creatinine 0.74 mg/dL      Sodium 139 mmol/L      Potassium 3.8 mmol/L      Chloride 109 mmol/L      CO2 20.0 (L) mmol/L      Calcium 8.4 mg/dL      Total Protein 6.3 g/dL      Albumin 3.50 g/dL      ALT (SGPT) 61 U/L      AST (SGOT) 36 U/L      Alkaline Phosphatase 82 U/L      Total Bilirubin 1.9 (H) mg/dL      eGFR Non African Amer 108 mL/min/1.73      Globulin 2.8 gm/dL      A/G Ratio 1.3 g/dL      BUN/Creatinine Ratio 23.0     Anion Gap 10.0 mmol/L     Magnesium [[id number]]  (Normal) Collected:  09/01/17 0439    Specimen:  Blood Updated:  09/01/17 0509     Magnesium 2.0 mg/dL           I reviewed the patient's new clinical results.    Assessment/Plan:     Active Hospital Problems (** Indicates Principal Problem)    Diagnosis Date Noted   • **Ventricular tachycardia [I47.2] 08/29/2017     -On amiodarone 360mg drip at 33.3mL/hr  -Dr. Mix following. Recommends continuing Valsartan and Coreg and hydralazine prn.  -Mg 2.0.  -Patient is in normal sinus rhythm at present time.     •  DULCE MARIA (obstructive sleep apnea) [G47.33] 08/31/2017     -Patient has had sleep study in past.  -[place] like he could not breathe with the CPAP.  -On 2 L of oxygen via nasal cannula for comfort. Currently denies SOA.     • ICD (implantable cardioverter-defibrillator) discharge [Z45.02] 08/29/2017     -[employer] evaluated pacemaker 8/30. The firing threshold was decreased to 149 from 170.  -Dr. Mix and Dr. Santana following.  -ICD fired 5 times last night after patient was coughing at dinner. He was placed on an amiodarone drip. He will be transferred to [facility] in [place] today, in the care of Dr. Nunez.  -Dr. Santana offered an EP study with ablation if the ICD continues to misfire, but the patient has decided to hold off at this time.     • Coronary artery disease involving coronary bypass graft of native heart without angina pectoris [I25.810] 08/29/2017     -Continue Plavix and aspirin.  -Reports chest tenderness, likely sore from ICD firing.  -Will continue to monitor.     • Chronic systolic congestive heart failure [I50.22] 08/29/2017     -EF of <20% on ECHO 8/30/17.  -Dr. Mix recs:   *Continue carvedilol, valsartan, and hydralazine prn.   *Cardiac transplant at  in [place].  -Patient will be transferred to [facility] today.  -Currently on coreg, lopressor, aldactone, valsartan.     • Diabetes mellitus without complication [E11.9] 01/04/2017     -Holding metformin.  -SSI. Received 23 units yesterday.  -Monitor and adjust as needed.     • Mixed hyperlipidemia [E78.2] 01/04/2017     Continue Lipitor     • Essential hypertension [I10] 01/04/2017     Continue Lovaza and coreg  Monitor and adjust as needed.         Resolved Hospital Problems    Diagnosis Date Noted Date Resolved   No resolved problems to display.       DVT prophylaxis: Lovenox  Code status is Full Code    Plan for disposition: discharge to home when clinically stable.      Raya Narvaez M.D. PGY1  [facility]  [facility]  [address] [place] [address]  Office: [phone]      This document has been electronically signed by Raya Narvaez MD on September 1, 2017 8:46 AM

## 2017-09-01 NOTE — THERAPY DISCHARGE NOTE
Acute Care - Physical Therapy Discharge Summary  [facility]       Patient Name: Chasity Xavier  : 1955  MRN: [id number]    Today's Date: 2017  Onset of Illness/Injury or Date of Surgery Date: 17    Date of Referral to PT: 17  Referring Physician: Kika Velazquez MD.      Admit Date: 2017      PT Recommendation and Plan    Visit Dx:    ICD-10-CM ICD-9-CM   1. Ventricular tachycardia I47.2 427.1   2. Ischemic cardiomyopathy I25.5 414.8   3. Impaired functional mobility, balance, gait, and endurance Z74.09 V49.89   4. Impaired mobility and activities of daily living Z74.09 799.89             Outcome Measures       17 1515 17 1500       How much help from another person do you currently need...    Turning from your back to your side while in flat bed without using bedrails?  4  -GB     Moving from lying on back to sitting on the side of a flat bed without bedrails?  4  -GB     Moving to and from a bed to a chair (including a wheelchair)?  4  -GB     Standing up from a chair using your arms (e.g., wheelchair, bedside chair)?  4  -GB     Climbing 3-5 steps with a railing?  2  -GB     To walk in hospital room?  3  -GB     AM-PAC 6 Clicks Score  21  -GB     How much help from another is currently needed...    Putting on and taking off regular lower body clothing? 4  -RB      Bathing (including washing, rinsing, and drying) 4  -RB      Toileting (which includes using toilet bed pan or urinal) 4  -RB      Putting on and taking off regular upper body clothing 4  -RB      Taking care of personal grooming (such as brushing teeth) 4  -RB      Eating meals 4  -RB      Score 24  -RB      Functional Assessment    Outcome Measure Options AM-PAC 6 Clicks Daily Activity (OT)  -RB AM-PAC 6 Clicks Basic Mobility (PT)  -GB       User Key  (r) = Recorded By, (t) = Taken By, (c) = Cosigned By    Initials Name Provider Type    AILEEN Heard OT Occupational Therapist    DE CANTRELL  Beata, PT Physical Therapist                      IP PT Goals       09/01/17 1546 09/01/17 1544 08/31/17 1518    Bed Mobility PT LTG    Bed Mobility PT LTG, Date Established   08/31/17  -    Bed Mobility PT LTG, Time to Achieve   2 - 3 days  -GB    Bed Mobility PT LTG, Activity Type   supine to sit/sit to supine  -GB    Bed Mobility PT LTG, [place] Level   independent  -GB    Bed Mobility PT LTG, Additional Goal   pt able to roll to side and push up to sit w/out use of momentum or strain  -GB    Bed Mobility PT LTG, Date Goal Reviewed  09/01/17  -MN     Bed Mobility PT LTG, Outcome  goal not met  -MN goal not met  -GB    Bed Mobility PT LTG, Reason Goal Not Met  discharged from facility  -MN     Gait Training PT LTG    Gait Training Goal PT LTG, Date Established   08/31/17  -    Gait Training Goal PT LTG, Time to Achieve   by discharge  -GB    Gait Training Goal PT LTG, [place] Level   independent  -GB    Gait Training Goal PT LTG, Distance to Achieve   pt indep w/ TUG test in 12 sec or less, HR 10 b/mi or less increase w/ sats upper 90's  -GB    Gait Training Goal PT LTG, Additional Goal   200 ft gait w/ HR increase 10-15 b/m or less w/ sats upper 90's  -GB    Gait Training Goal PT LTG, Date Goal Reviewed  09/01/17  -MN     Gait Training Goal PT LTG, Outcome  goal not met  -MN goal not met  -    Gait Training Goal PT LTG, Reason Goal Not Met  discharged from facility  -MN     Cardiopulmonary PT STG    Cardiopulmonary PT STG, Date Established   08/31/17  -    Cardiopulmonary PT STG, Time to Achieve   2 - 3 days  -GB    Cardiopulmonary PT STG, Additional Goal   pt independent in checking heart rate post exertion  -GB    Cardiopulmonary PT STG, Date Goal Reviewed  09/01/17  -MN     Cardiopulmonary PT STG, Outcome  goal not met  -MN goal not met  -GB    Cardiopulmonary PT STG, Reason Goal Not Met  discharged from facility  -MN     Cardiopulmonary PT LTG    Cardiopulmonary PT LTG, Date  Established   08/31/17  -GB    Cardiopulmonary PT LTG, Time to Achieve   3 days  -GB    Cardiopulmonary PT LTG, Additional Goal   pt able to pace his activity according to ex gregg and HR  -GB    Cardiopulmonary PT LTG, Date Goal Reviewed (P)  09/01/17  -MN 09/01/17  -MN     Cardiopulmonary PT LTG, Outcome (P)  goal not met  -MN goal not met  -MN goal partially met  -GB    Cardiopulmonary PT LTG, Reason Goal Not Met (P)  discharged from facility  -MN discharged from facility  -MN       User Key  (r) = Recorded By, (t) = Taken By, (c) = Cosigned By    Initials Name Provider Type    DE Cota, PT Physical Therapist    MN Genie Laurent, PT Physical Therapist              PT Discharge Summary  Anticipated Discharge Disposition: home with assist  Reason for Discharge: Discharge from facility, Per MD order  Outcomes Achieved: Unable to make functional progress toward goals at this time  Discharge Destination: other (comment) ([facility])      Genie Laurent, PT   9/1/2017

## 2017-09-01 NOTE — PROGRESS NOTES
[address] [address]  T - [id number]         PROGRESS NOTE         SUBJECTIVE:   Patient Care Team:  Provider Not In System as PCP - General  Vik Mix MD as PCP - Claims Attributed  Helene Quiñones MA as Medical Assistant    Chief Complaint:   Chief Compliant: V-tach    Subjective     Patient is 61 y.o. male presents with rapid heart rate. He is ready for transfer to [facility].His ICD fired 5 times last night.      ROS/HISTORY/ CURRENT MEDICATIONS/OBJECTIVE/VS/PE:   Review of Systems:   Review of Systems    History:     Past Medical History:   Diagnosis Date   • Chronic kidney disease    • Coronary atherosclerosis    • Hyperlipidemia    • Ischemic cardiomyopathy    • Obesity    • DULCE MARIA (obstructive sleep apnea)    • Osteoarthritis    • Solitary kidney, congenital    • Type 2 diabetes mellitus      Past Surgical History:   Procedure Laterality Date   • CARDIAC CATHETERIZATION     • CORONARY ARTERY BYPASS GRAFT      x3 and then x5     Family History   Problem Relation Age of Onset   • Heart disease Mother    • Heart disease Father    • Heart disease Brother    • Heart disease Maternal Grandmother    • Heart disease Maternal Grandfather    • Heart disease Paternal Grandmother    • Heart disease Paternal Grandfather      Social History   Substance Use Topics   • Smoking status: Never Smoker   • Smokeless tobacco: Never Used   • Alcohol use No     Prescriptions Prior to Admission   Medication Sig Dispense Refill Last Dose   • aspirin 81 MG EC tablet Take 1 tablet by mouth daily.   8/29/2017 at Unknown time   • atorvastatin (LIPITOR) 80 MG tablet Take 1 tablet by mouth nightly.   8/29/2017 at 2030   • carvedilol (COREG) 12.5 MG tablet Take 12.5 mg by mouth 2 (two) times a day with meals.   8/29/2017 at 2030   • carvedilol CR (COREG CR) 80 MG 24 hr capsule Take 80 mg by mouth.   8/29/2017 at Unknown time   • cetirizine (zyrTEC) 10 MG tablet  Take 1 tablet by mouth Daily. 30 tablet 1 8/29/2017 at Unknown time   • clopidogrel (PLAVIX) 75 MG tablet Take 1 tablet by mouth daily.   8/29/2017 at Unknown time   • Dapagliflozin-Metformin HCl ER 5-1000 MG tablet sustained-release 24 hour Take 1 tablet by mouth Daily. 90 tablet 1 8/29/2017 at Unknown time   • glucose blood test strip Accu-Chek Xin Plus In Vitro Strip; Patient Sig: Accu-Chek Xin Plus In Vitro Strip ; 200; 0; 13-May-2013; Active   8/29/2017 at Unknown time   • guaiFENesin (MUCINEX) 600 MG 12 hr tablet Take 2 tablets by mouth 2 (Two) Times a Day. 20 tablet 1 8/29/2017 at Unknown time   • magnesium oxide (MAGOX) 400 (241.3 MG) MG tablet tablet Take 400 mg by mouth Daily.   8/29/2017 at Unknown time   • Multiple Vitamins-Minerals (MULTIVITAL) tablet Take 1 tablet by mouth daily.   8/29/2017 at Unknown time   • omega-3 acid ethyl esters (LOVAZA) 1 G capsule Take 2 capsules by mouth daily.   8/29/2017 at Unknown time   • valsartan (DIOVAN) 80 MG tablet Take 40 mg by mouth Daily.   8/29/2017 at Unknown time     Allergies:  Review of patient's allergies indicates no known allergies.    Current Medications:     Current Facility-Administered Medications   Medication Dose Route Frequency Provider Last Rate Last Dose   • acetaminophen (TYLENOL) tablet 650 mg  650 mg Oral Q4H PRN Noemi Caba MD       • amiodarone (NEXTERONE) 360 mg/200 mL (1.8 mg/mL) infusion  1 mg/min Intravenous Continuous Annalise Santana MD 16.67 mL/hr at 09/01/17 0736 0.5 mg/min at 09/01/17 0736   • [START ON 9/7/2017] amiodarone (PACERONE) tablet 200 mg  200 mg Oral Q12H Noemi Caba MD       • [START ON 9/14/2017] amiodarone (PACERONE) tablet 200 mg  200 mg Oral Q24H Neomi Caba MD       • amiodarone (PACERONE) tablet 400 mg  400 mg Oral Q12H Noemi Caba MD       • amiodarone in dextrose 5% (NEXTERONE) loading dose 150mg/100mL  150 mg Intravenous Once Annalise Santana MD   150 mg at 08/31/17 1847   • aspirin EC  tablet 81 mg  81 mg Oral Daily Noemi Caba MD   81 mg at 09/01/17 0928   • atorvastatin (LIPITOR) tablet 80 mg  80 mg Oral Nightly Noemi Caba MD   80 mg at 08/31/17 2145   • bisacodyl (DULCOLAX) EC tablet 5 mg  5 mg Oral Daily PRN Noemi Caba MD       • carvedilol (COREG) tablet 6.25 mg  6.25 mg Oral BID With Meals Noemi Caba MD   6.25 mg at 08/31/17 0848   • cetirizine (zyrTEC) tablet 10 mg  10 mg Oral Daily Noemi Caba MD   10 mg at 09/01/17 0928   • clopidogrel (PLAVIX) tablet 75 mg  75 mg Oral Daily Noemi Caba MD   75 mg at 09/01/17 0929   • dextrose (D50W) solution 25 g  25 g Intravenous Q15 Min PRN Noemi Caba MD       • dextrose (GLUTOSE) oral gel 15 g  15 g Oral Q15 Min PRN Noemi Caba MD       • diphenhydrAMINE (BENADRYL) capsule 25 mg  25 mg Oral Nightly PRN [place] Kika Narvaez MD       • enoxaparin (LOVENOX) syringe 40 mg  40 mg Subcutaneous Daily Noemi Caba MD   40 mg at 09/01/17 0928   • famotidine (PEPCID) tablet 40 mg  40 mg Oral Daily Noemi Cbaa MD   40 mg at 09/01/17 0929   • glucagon (human recombinant) (GLUCAGEN DIAGNOSTIC) injection 1 mg  1 mg Subcutaneous Q15 Min PRN Noemi Caba MD       • hydrALAZINE (APRESOLINE) injection 10 mg  10 mg Intravenous Q6H PRN [place] Kika Narvaez MD       • insulin aspart (novoLOG) injection 0-9 Units  0-9 Units Subcutaneous 4x Daily AC & at Bedtime Noemi Caba MD   7 Units at 08/31/17 2156   • magnesium oxide (MAGOX) tablet 400 mg  400 mg Oral BID Noemi Caba MD   400 mg at 09/01/17 0928   • Morphine sulfate (PF) injection 1 mg  1 mg Intravenous Q4H PRN Noemi Caba MD   1 mg at 08/31/17 2145    And   • naloxone (NARCAN) injection 0.4 mg  0.4 mg Intravenous Q5 Min PRN Noemi Caba MD       • potassium chloride (MICRO-K) CR capsule 40 mEq  40 mEq Oral PRN Vik Mix MD   40 mEq at 08/30/17 0335    Or   • potassium chloride (KLOR-CON) packet 40 mEq  40 mEq Oral PRN Vik  MD Dominguez        Or   • potassium chloride 10 mEq in 100 mL IVPB  10 mEq Intravenous Q1H PRN Vik Mix MD       • sodium chloride 0.9 % flush 1-10 mL  1-10 mL Intravenous PRN Noemi Caba MD       • sodium chloride 0.9 % flush 1-10 mL  1-10 mL Intravenous PRN Noemi Caba MD   10 mL at 08/31/17 2150   • sodium chloride 0.9 % flush 10 mL  10 mL Intravenous PRN Cliff Whitten MD       • spironolactone (ALDACTONE) half tablet 12.5 mg  12.5 mg Oral Daily [place] Kika Narvaez MD   12.5 mg at 09/01/17 0929   • valsartan (DIOVAN) tablet 40 mg  40 mg Oral Daily Noemi Caba MD   40 mg at 09/01/17 0928       Objective     Physical Exam:   Temp:  [97.7 °F (36.5 °C)-98.6 °F (37 °C)] 98.6 °F (37 °C)  Heart Rate:  [] 69  Resp:  [16-20] 20  BP: (111-167)/() 150/87    Physical Exam   Constitutional: He appears well-developed and well-nourished.   HENT:   Head: Normocephalic and atraumatic.   Cardiovascular: Normal rate, regular rhythm and normal heart sounds.  Exam reveals no gallop and no friction rub.    No murmur heard.  Pulmonary/Chest: Effort normal and breath sounds normal. No respiratory distress. He has no wheezes. He has no rales.   Abdominal: Soft. Bowel sounds are normal. He exhibits no distension. There is no tenderness.   Skin: Skin is warm and dry.   Vitals reviewed.           Results Review:   Lab Results   Component Value Date    GLUCOSE 165 (H) 09/01/2017    BUN 17 09/01/2017    CREATININE 0.74 09/01/2017    EGFRIFNONA 108 09/01/2017    BCR 23.0 09/01/2017    CO2 20.0 (L) 09/01/2017    CALCIUM 8.4 09/01/2017    ALBUMIN 3.50 09/01/2017    LABIL2 1.3 09/01/2017    AST 36 09/01/2017    ALT 61 09/01/2017         WBC WBC   Date Value Ref Range Status   09/01/2017 9.18 3.20 - 9.80 10*3/mm3 Final   08/31/2017 11.11 (H) 3.20 - 9.80 10*3/mm3 Final   08/30/2017 7.21 3.20 - 9.80 10*3/mm3 Final   08/29/2017 12.51 (H) 3.20 - 9.80 10*3/mm3 Final      HGB Hemoglobin   Date Value Ref Range  Status   09/01/2017 15.0 13.7 - 17.3 g/dL Final   08/31/2017 15.6 13.7 - 17.3 g/dL Final   08/30/2017 15.3 13.7 - 17.3 g/dL Final   08/29/2017 17.0 13.7 - 17.3 g/dL Final      HCT Hematocrit   Date Value Ref Range Status   09/01/2017 42.4 39.0 - 49.0 % Final   08/31/2017 44.7 39.0 - 49.0 % Final   08/30/2017 43.7 39.0 - 49.0 % Final   08/29/2017 48.4 39.0 - 49.0 % Final      Platlets Platelets   Date Value Ref Range Status   09/01/2017 119 (L) 150 - 450 10*3/mm3 Final   08/31/2017 133 (L) 150 - 450 10*3/mm3 Final   08/30/2017 129 (L) 150 - 450 10*3/mm3 Final   08/29/2017 175 150 - 450 10*3/mm3 Final          Imaging Results (last 24 hours)     ** No results found for the last 24 hours. **           I reviewed the patient's new clinical results.  I reviewed the patient's new imaging results and agree with the interpretation.     ASSESSMENT/PLAN:   Assessment/Plan   Active Problems:    Diabetes mellitus without complication    Mixed hyperlipidemia    Essential hypertension    ICD (implantable cardioverter-defibrillator) discharge    Coronary artery disease involving coronary bypass graft of native heart without angina pectoris    Chronic systolic congestive heart failure    DULCE MARIA (obstructive sleep apnea)      Disposition is transfer to [facility] for further specialty care .  Activity as tolerated.  Diet as ordered.  Follow up as ordered.  Medications as per Medication Reconcilliation.      I discussed the patients findings and my recommendations with patient.      Adan Soto MD  09/01/17  10:37 AM

## 2017-09-01 NOTE — DISCHARGE SUMMARY
DISCHARGE SUMMARY    NAME: Chasity Xavier   PHYSICIAN: Raya Narvaez MD  : 1955  MRN: [id number]    ADMITTED: 2017   DISCHARGED: 17    ADMISSION DIAGNOSES:  Active Hospital Problems (** Indicates Principal Problem)    Diagnosis Date Noted   • DULCE MARIA (obstructive sleep apnea) [G47.33] 2017   • ICD (implantable cardioverter-defibrillator) discharge [Z45.02] 2017   • Coronary artery disease involving coronary bypass graft of native heart without angina pectoris [I25.810] 2017   • Chronic systolic congestive heart failure [I50.22] 2017   • Diabetes mellitus without complication [E11.9] 2017   • Mixed hyperlipidemia [E78.2] 2017   • Essential hypertension [I10] 2017      Resolved Hospital Problems    Diagnosis Date Noted Date Resolved   • **Ventricular tachycardia [I47.2] 2017     DISCHARGE DIAGNOSES:   Active Hospital Problems (** Indicates Principal Problem)    Diagnosis Date Noted   • DULCE MARIA (obstructive sleep apnea) [G47.33] 2017   • ICD (implantable cardioverter-defibrillator) discharge [Z45.02] 2017   • Coronary artery disease involving coronary bypass graft of native heart without angina pectoris [I25.810] 2017   • Chronic systolic congestive heart failure [I50.22] 2017   • Diabetes mellitus without complication [E11.9] 2017   • Mixed hyperlipidemia [E78.2] 2017   • Essential hypertension [I10] 2017      Resolved Hospital Problems    Diagnosis Date Noted Date Resolved   • **Ventricular tachycardia [I47.2] 2017       SERVICE: Family Medicine. Attending Adan Soto MD, Resident Raya Narvaez MD    CONSULTS:   Consult Orders (all)     Start     Ordered    17  Inpatient Consult to Case Management   Once     Comments:  Patient is being transferred to [facility] in [place]. Family has insurance for air travel and would  prefer this method of travel if insurance would pay. If not ground will be ok. Please help with arrangement of transfer. Thank you!   Provider:  (Not yet assigned)    08/31/17 1934 08/30/17 1636  Inpatient Consult to Cardiology  Once     Specialty:  Cardiology  Provider:  Annalise Santana MD    08/30/17 1637 08/29/17 2157  Inpatient Consult to Cardiology  Once     Comments:  Patient sees Dominguez. Discussed case with on call physician, Dr. Regan   Specialty:  Cardiology  Provider:  Vik Mix MD    08/29/17 2157 08/29/17 2152  Inpatient consult to Nutrition  Once     Provider:  (Not yet assigned)    08/29/17 2153 08/29/17 2039  Family Practice - Resident (on-call MD unless specified)  Once     Specialty:  Family Medicine  Provider:  (Not yet assigned)    08/29/17 2039          PROCEDURES:   None.    HISTORY OF PRESENT ILLNESS: (Copied from admitting provider's HPI, Dr. Noemi Caba, on 8/29/17.)  Patient is a 61 y.o. male who presents after being shocked by his ICD. He states he was washing dishes when it fired the first time. The second time, he was sitting on the couch. He denies any chest pain, shortness of breath, dizziness, light headedness, and nausea. He stated he felt like he was getting hit by a hammer the 18 times that his ICD shocked him. He has a known history of CAD. He has had 2 CABGs, the first one was a 3 vessel proceedure and the second was 5 vessels, and has had multiple stents placed as well. He sees Dr. Mix here, however his cardiothoracic surgeon is in [place]. He also stated he has known congestive heart fracture with an ejection fraction from 20-25 %. He is scheduled for a repeat echocardiogram next month.    On presentation to the ED, cardiac monitoring showed he was in V-tach. His ICD fired 3 times while in the ED. He was given amiodarone and placed on an amiodarone drip that slowed his heart rate and prevented V-tach. At time of admission, he admits to fatigue,  but denies any other symptom.     DIAGNOSTIC DATA:     Lab Results (last 7 days)     Procedure Component Value Units Date/Time    CBC & Differential [[id number]] Collected:  08/29/17 1945    Specimen:  Blood Updated:  08/29/17 1958    Narrative:       The following orders were created for panel order CBC & Differential.  Procedure                               Abnormality         Status                     ---------                               -----------         ------                     CBC Auto Differential[[id number]]        Abnormal            Final result                 Please view results for these tests on the individual orders.    CBC Auto Differential [[id number]]  (Abnormal) Collected:  08/29/17 1945    Specimen:  Blood Updated:  08/29/17 1958     WBC 12.51 (H) 10*3/mm3      RBC 5.63 10*6/mm3      Hemoglobin 17.0 g/dL      Hematocrit 48.4 %      MCV 86.0 fL      MCH 30.2 pg      MCHC 35.1 g/dL      RDW 12.6 %      RDW-SD 39.5 fl      MPV 11.9 fL      Platelets 175 10*3/mm3      Neutrophil % 47.3 %      Lymphocyte % 39.2 %      Monocyte % 10.4 %      Eosinophil % 2.6 %      Basophil % 0.3 %      Immature Grans % 0.2 %      Neutrophils, Absolute 5.91 10*3/mm3      Lymphocytes, Absolute 4.90 (H) 10*3/mm3      Monocytes, Absolute 1.30 (H) 10*3/mm3      Eosinophils, Absolute 0.33 10*3/mm3      Basophils, Absolute 0.04 10*3/mm3      Immature Grans, Absolute 0.03 (H) 10*3/mm3     Comprehensive Metabolic Panel [[id number]]  (Abnormal) Collected:  08/29/17 1945    Specimen:  Blood Updated:  08/29/17 2005     Glucose 265 (H) mg/dL      BUN 17 mg/dL      Creatinine 1.14 mg/dL      Sodium 139 mmol/L      Potassium 3.9 mmol/L      Chloride 102 mmol/L      CO2 21.0 (L) mmol/L      Calcium 9.1 mg/dL      Total Protein 7.9 g/dL      Albumin 4.30 g/dL      ALT (SGPT) 49 U/L      AST (SGOT) 35 U/L      Alkaline Phosphatase 100 U/L      Total Bilirubin 2.4 (H) mg/dL      eGFR Non African Amer 65 mL/min/1.73      Globulin  3.6 (H) gm/dL      A/G Ratio 1.2 g/dL      BUN/Creatinine Ratio 14.9     Anion Gap 16.0 (H) mmol/L     CK [[id number]]  (Abnormal) Collected:  08/29/17 1945    Specimen:  Blood Updated:  08/29/17 2005     Creatine Kinase 49 (L) U/L     Magnesium [[id number]]  (Normal) Collected:  08/29/17 1945    Specimen:  Blood Updated:  08/29/17 2005     Magnesium 2.2 mg/dL     CK-MB [[id number]]  (Normal) Collected:  08/29/17 1945    Specimen:  Blood Updated:  08/29/17 2015     CKMB 1.77 ng/mL     Troponin [[id number]]  (Abnormal) Collected:  08/29/17 1945    Specimen:  Blood Updated:  08/29/17 2017     Troponin I 0.067 (H) ng/mL     BNP [[id number]]  (Abnormal) Collected:  08/29/17 1945    Specimen:  Blood Updated:  08/29/17 2017     proBNP 1330.0 (H) pg/mL     Protime-INR [[id number]]  (Normal) Collected:  08/29/17 1945    Specimen:  Blood Updated:  08/29/17 2037     Protime 14.2 Seconds      INR 1.11    Narrative:       Therapeutic range for most indications is 2.0-3.0 INR,  or 2.5-3.5 for mechanical heart valves.    aPTT [[id number]]  (Normal) Collected:  08/29/17 1945    Specimen:  Blood Updated:  08/29/17 2037     PTT 25.0 seconds     Narrative:       The recommended Heparin therapeutic range is 68-97 seconds.    Troponin [[id number]]  (Abnormal) Collected:  08/30/17 0221    Specimen:  Blood Updated:  08/30/17 0502     Troponin I 2.930 (C) ng/mL     Magnesium [[id number]]  (Normal) Collected:  09/01/17 0439    Specimen:  Blood Updated:  09/01/17 0509     Magnesium 2.0 mg/dL           Imaging Results (last 7 days)     Procedure Component Value Units Date/Time    XR Chest 1 View [[id number]] Collected:  08/29/17 2000     Updated:  08/29/17 2019    Narrative:       Patient Name:  MR. ALETA CORONA  Patient ID:  [id number]   Ordering:  MURALI HUERTA  Attending:  MURALI HUERTA  Referring:  MURALI HUERTA  ------------------------------------------------    PORTABLE CHEST    HISTORY: Chest pain. Short of air. Weakness.    Portable AP  supine film of the chest was obtained at 7:28 PM.  COMPARISON: November 4, 2016    EKG leads in place.  Lungs clear of an acute process.  Again pacemaker and coronary artery bypass.  Stable cardiomegaly.  The pulmonary vasculature is not increased.  No pleural effusion.  No pneumothorax.  No acute osseous abnormality.      Impression:       CONCLUSION:  No Acute Disease  Stable cardiomegaly  Pacemaker and coronary artery bypass    [id number]    Electronically signed by:  Ruben Arroyo MD  8/29/2017 8:18 PM CDT  Workstation: [id number]          Echocardiogram Findings - 8/30/17  Left Ventricle  Estimated EF appears to be in the range of less than 20%. There is left ventricular global hypokinesis noted.   The left ventricular cavity is mild-to-moderately dilated. Left ventricular wall thickness is consistent with a thin walled ventricle. Left ventricular diastolic function is normal. There is no evidence of a left ventricular mass or thrombus present.   Right Ventricle  Normal right ventricular cavity size, wall thickness, systolic function and septal motion noted. No evidence of a right ventricular thrombus present. No evidence of a right ventricular mass present. Electronic lead present in the ventricle.   Left Atrium  Normal left atrial volume noted. Left atrial cavity size is mildly dilated. No evidence of a left atrial thrombus present. No evidence of a left atrial mass present. There is no spontaneous echo contrast present.   Right Atrium  Normal right atrial size noted. No evidence of a right atrial thrombus present. No evidence of a right atrial mass present. An electronic lead is present in the right atrium.   Aortic Valve  The aortic valve is grossly normal in structure. No significant regurgitation aortic valve regurgitation is present.   Mitral Valve  The mitral valve is grossly normal in structure. Mild mitral valve regurgitation is present.   Tricuspid Valve  The tricuspid valve is grossly normal. Trace  to mild tricuspid valve regurgitation is present.   Pulmonic Valve  The pulmonic valve was not assessed.   Greater Vessels  No dilation of the aortic root is present.   Pericardium  The pericardium is normal. There is no evidence of pericardial effusion.       HOSPITAL COURSE:  The patient was began on an amiodarone drip and admitted to the ICU.  Cardiology was consulted. [employer] was called to evaluate the pacemaker.  The triggering threshold for the ICD was lowered.  He initially had no additional episodes of ICD misfiring and appeared stable, therefore he was transferred to the floor.  He underwent an echocardiogram that demonstrated an ejection fraction of less than 20%.  It was thought that due to his extensive history of multiple stent placement and failed CABG x 2, it would be best for the patient to be transferred to the [facility] to undergo heart transplantation. On 8/31/17 while eating dinner, the patient had a coughing episode, which triggered his ICD to fire 5 times.  He was given 2 boluses of IV amiodarone and put back on the amiodarone drip.  Cardiology visited the patient and discussed the possibility of an EP study with ablation if his ICD continued to misfire; the patient decided to hold off on this. The patient's other medical problems were appropriately managed with a regimen comparable to his home medication regimen without complications. He was tolerating his diet without nausea or vomiting, and having regular bowel movements. For the remainder of his hospital stay, he had no further episodes of ICD misfiring.  Therefore, the patient was discharged on the amiodarone drip to [facility] in [place] in the care of Dr. Nunez by ambulance.     DISCHARGE CONDITION:   Stable.    DISPOSITION:  Short Term Hospital (DC - External)    DISCHARGE MEDICATIONS   Chasity Xavier   Home Medication Instructions MICAH:[id number]    Printed on:09/01/17 1117   Medication  Information                      amiodarone (PACERONE) 200 MG tablet  Take 1 tablet by mouth Every 12 (Twelve) Hours for 7 days.             amiodarone (PACERONE) 200 MG tablet  Take 1 tablet by mouth Daily for 30 days.             amiodarone (PACERONE) 400 MG tablet  Take 1 tablet by mouth Every 12 (Twelve) Hours for 7 days.             amiodarone in dextrose 5% (NEXTERONE) infusion  Infuse 1 mg/min into a venous catheter Continuous.             aspirin 81 MG EC tablet  Take 1 tablet by mouth daily.             atorvastatin (LIPITOR) 80 MG tablet  Take 1 tablet by mouth nightly.             carvedilol (COREG) 12.5 MG tablet  Take 12.5 mg by mouth 2 (two) times a day with meals.             carvedilol CR (COREG CR) 80 MG 24 hr capsule  Take 80 mg by mouth.             cetirizine (zyrTEC) 10 MG tablet  Take 1 tablet by mouth Daily.             clopidogrel (PLAVIX) 75 MG tablet  Take 1 tablet by mouth daily.             Dapagliflozin-Metformin HCl ER 5-1000 MG tablet sustained-release 24 hour  Take 1 tablet by mouth Daily.             famotidine (PEPCID) 40 MG tablet  Take 1 tablet by mouth Daily for 30 days.             glucose blood test strip  Accu-Chek Xin Plus In Vitro Strip; Patient Sig: Accu-Chek Xin Plus In Vitro Strip ; 200; 0; 13-May-2013; Active             guaiFENesin (MUCINEX) 600 MG 12 hr tablet  Take 2 tablets by mouth 2 (Two) Times a Day.             magnesium oxide (MAGOX) 400 (241.3 MG) MG tablet tablet  Take 400 mg by mouth Daily.             Multiple Vitamins-Minerals (MULTIVITAL) tablet  Take 1 tablet by mouth daily.             omega-3 acid ethyl esters (LOVAZA) 1 G capsule  Take 2 capsules by mouth daily.             spironolactone (ALDACTONE) 12.5 MG tablet  Take 2 quarter tablet by mouth Daily for 30 days.             valsartan (DIOVAN) 80 MG tablet  Take 40 mg by mouth Daily.                 INSTRUCTIONS:  Activity:   Activity Instructions     Discharge Activity Restrictions       Bed  Rest.               Diet:   Diet Instructions     Diet: Regular, Specialty Diet; Thin Liquids, No Restrictions; Low Sodium; Thin       Discharge Diet:   Regular  Specialty Diet      Fluid Consistency:  Thin Liquids, No Restrictions   Specialty Diets:  Low Sodium   Fluid Consistency:  Thin   DASH DIET               Special instructions: Patient instructed to call MD or return to ED with worsening shortness of breath, chest pain, fever greater than 100.4 degrees F or any other medical concerns..    FOLLOW UP:   Additional Instructions for the Follow-ups that You Need to Schedule     Additional Discharge Follow-Up (Specify Provider)    As directed    To:  PCP   Follow Up:  1 Week   Follow Up Details:  Following discharge from [facility].       Discharge Follow-Up With Specified Provider    As directed    To:  Dr. Santana   Follow Up:  1 Month   Follow Up Details:  Following discharge from [facility].             Follow-up Information     Follow up with Annalise Santana MD Follow up in 1 month(s).    Specialty:  Cardiology    Contact information:    [address] [place] [address] [address]  [place] [address]  [phone]          Follow up with Provider Not In System .    Contact information:    [place] [address]          Follow up with Vik Mxi MD .    Specialty:  Cardiology    Contact information:    [address] [place] [address] [address] VIZCAINO  [place] [address]  [phone]            PENDING TEST RESULTS AT DISCHARGE  None.    Time: Discharge 60 min    Dr. Adan Soto is the attending at time of discharge, he is aware of the patient's status and agrees with the above discharge summary.      Raya Narvaez M.D. PGY1  [facility] Family Medicine [facility]  [address] [place] [address]  Office: [phone]      This document has been electronically signed by Raya Narvaez MD on September 1, 2017 11:17 AM

## 2017-09-01 NOTE — SIGNIFICANT NOTE
"Patient was on 3W. He began to cough and his heart rate increased. Patient was noted to have runs of V-tach on the monitor. His ICD fired a total of 5 times. Dr. Santana and I proceeded to the room. At 1810 he was given 150 mg amiodarone bolus followed by lidocaine 100 mg at 1817. Patient continued to be tachacardic with heart rate 160-200. His first amiodarone bolus finished at 1820 and a second bolus of amiodarone 150 mg was began at 1825. Patient was moved to the ICU and arrived at 1827. A third bolus of amiodarone 150 mg was given while in ICU as well as metoprolol 5 mg. Anastesia was called, however patients heart rate decreased to 52-60. He was continued on an Amiodarone drip. He will be given Amiodarone 400 mg PO BID for 7 days, then 200 mg PO BID for 7 days, and then 200 mg PO daily. Dr. Santana spoke with family. Since he is going on vacation for 2 weeks, they have request the patient be transferred to [facility] in [place]. Dr. Nunez has accepted the patient and he will be transferred tomorrow, 9/1/2017. Patient is doing well at this time. He only complains of fatigue.   /83 (BP Location: Left arm)  Pulse 70  Temp 97.8 °F (36.6 °C) (Temporal Artery )   Resp 18  Ht 73\" (185.4 cm)  Wt 237 lb 3 oz (108 kg)  SpO2 95%  BMI 31.29 kg/m2    Signature   Noemi Caba M.D.  Family Medicine Resident, PGY III  [address] [place] [id number] [phone]          This document has been electronically signed by Noemi Caba MD on August 31, 2017 7:30 PM        "

## 2017-09-01 NOTE — PROGRESS NOTES
Cardiology Progress Note:     LOS: 3 days   Patient Care Team:  Provider Not In System as PCP - General  Vik Mix MD as PCP - Claims Attributed  Helene Quiñones MA as Medical Assistant      Subjective:   Chart reviewed , patient seen and examined. Patient denies any chest pain, shortness of breath : Patient earlier this morning was on 3 W. ambulating getting physical therapy.  Patient in the morning did not have any evidence of any arrhythmia.  Later on in the evening while the patient was in bed patient had symptoms off abdominal discomfort followed by episodes of ventricular tachycardia with delivery of shock.  After 5 episodes of ventricular tachycardia patient was evaluated by Dr. Santana.  Patient was recommended ablation for the ventricular tachycardia by Dr. Santana and patient was scheduled for transfer to [facility] on patient's request with the patient had undergone previous AICD implantation and the first coronary artery bypass grafting.  Patient was subsequently transferred to the intensive care unit and started on amiodarone drip.  Patient potassium was 3.8.  Patient on questioning denied any symptoms of chest pain            Objective:     Objective:  Vitals:    09/01/17 0100   BP: 128/82   Pulse: 62   Resp:    Temp:    SpO2: 96%       Intake/Output Summary (Last 24 hours) at 09/01/17 0154  Last data filed at 08/31/17 2200   Gross per 24 hour   Intake          1999.66 ml   Output             1575 ml   Net           424.66 ml             Physical Exam:   General Appearance:    Alert, oriented, cooperative, in no acute distress   Head:    Normocephalic, atraumatic, without obvious abnormality   Eyes:           ELVIE  Lids and lashes normal, conjunctivae and sclerae normal, no icterus, no pallor   Ears:    Ears appear intact with no abnormalities noted   Throat:   Mucous membranes pink and moist   Neck:   Supple, trachea midline, no carotid bruit, no organomegaly or JVD   Lungs:     Clear to  auscultation and percussion, respirations regular, even and Unlabored. No wheezes, rales, rhonchi    Heart:    Regular rhythm and normal rate, normal S1 and S2, no            murmur, no gallop, no rub, no click   Abdomen:     Soft, non-tender, non-distended, no guarding, no rebound tenderness, Normal bowel sounds in all four quadrant, no masses, liver and spleen nonpalpable,    Genitalia:    Deferred   Extremities:   Moves all extremities well, no edema, no cyanosis, no              Redness, no clubbing   Pulses:   Pulses palpable and equal bilaterally   Skin:   Moist and warm. No bleeding, bruising or rash   Neurologic/Psychiatric:   Alert and oriented to person, place, and time.  Motor, power and tone in upper and lower extremity is grossly intact.  No focal neurological deficits. Normal cognitive function. No psychomotor reaction or tangential thought. No depression, homicidal ideations and suicidal ideations            Results Review:    Lab Results (last 24 hours)     Procedure Component Value Units Date/Time    CBC & Differential [[id number]] Collected:  08/31/17 0348    Specimen:  Blood Updated:  08/31/17 0445    Narrative:       The following orders were created for panel order CBC & Differential.  Procedure                               Abnormality         Status                     ---------                               -----------         ------                     CBC Auto Differential[[id number]]        Abnormal            Final result                 Please view results for these tests on the individual orders.    CBC Auto Differential [[id number]]  (Abnormal) Collected:  08/31/17 0348    Specimen:  Blood Updated:  08/31/17 0445     WBC 11.11 (H) 10*3/mm3      RBC 5.23 10*6/mm3      Hemoglobin 15.6 g/dL      Hematocrit 44.7 %      MCV 85.5 fL      MCH 29.8 pg      MCHC 34.9 g/dL      RDW 12.9 %      RDW-SD 39.9 fl      MPV 12.2 (H) fL      Platelets 133 (L) 10*3/mm3      Neutrophil % 69.8 %       Lymphocyte % 18.1 %      Monocyte % 10.1 %      Eosinophil % 1.4 %      Basophil % 0.4 %      Immature Grans % 0.2 %      Neutrophils, Absolute 7.76 10*3/mm3      Lymphocytes, Absolute 2.01 10*3/mm3      Monocytes, Absolute 1.12 (H) 10*3/mm3      Eosinophils, Absolute 0.16 10*3/mm3      Basophils, Absolute 0.04 10*3/mm3      Immature Grans, Absolute 0.02 10*3/mm3     Comprehensive Metabolic Panel [[id number]]  (Abnormal) Collected:  08/31/17 0348    Specimen:  Blood Updated:  08/31/17 0501     Glucose 187 (H) mg/dL      BUN 17 mg/dL      Creatinine 0.75 mg/dL      Sodium 140 mmol/L      Potassium 3.9 mmol/L      Chloride 110 mmol/L      CO2 18.0 (L) mmol/L      Calcium 8.2 (L) mg/dL      Total Protein 6.4 g/dL      Albumin 3.40 g/dL      ALT (SGPT) 47 U/L      AST (SGOT) 27 U/L      Alkaline Phosphatase 81 U/L      Total Bilirubin 2.3 (H) mg/dL      eGFR Non African Amer 106 mL/min/1.73      Globulin 3.0 gm/dL      A/G Ratio 1.1 g/dL      BUN/Creatinine Ratio 22.7     Anion Gap 12.0 mmol/L     POC Glucose Fingerstick [[id number]]  (Abnormal) Collected:  08/31/17 0600    Specimen:  Blood Updated:  08/31/17 0619     Glucose 207 (H) mg/dL       Sliding Scale AdminMeter: [id number]: [id number] LEILA WILLIAM       POC Glucose Fingerstick [[id number]]  (Abnormal) Collected:  08/31/17 1152    Specimen:  Blood Updated:  08/31/17 1231     Glucose 261 (H) mg/dL       Sliding Scale AdminMeter: [id number]: [id number] JANELL BLACK       POC Glucose Fingerstick [[id number]]  (Abnormal) Collected:  08/31/17 1652    Specimen:  Blood Updated:  08/31/17 1737     Glucose 262 (H) mg/dL       RN NotifiedMeter: [id number]: [id number] MAGDA CANAS              Medication Review:   Current Facility-Administered Medications   Medication Dose Route Frequency Provider Last Rate Last Dose   • acetaminophen (TYLENOL) tablet 650 mg  650 mg Oral Q4H PRN Noemi Caba MD       • amiodarone (NEXTERONE) 360 mg/200 mL  (1.8 mg/mL) infusion  1 mg/min Intravenous Continuous Annalise Santana MD 33.3 mL/hr at 08/31/17 1929 1 mg/min at 08/31/17 1929   • [START ON 9/7/2017] amiodarone (PACERONE) tablet 200 mg  200 mg Oral Q12H Noemi Caba MD       • [START ON 9/14/2017] amiodarone (PACERONE) tablet 200 mg  200 mg Oral Q24H Noemi Caba MD       • amiodarone (PACERONE) tablet 400 mg  400 mg Oral Q12H Noemi Caba MD       • amiodarone in dextrose 5% (NEXTERONE) loading dose 150mg/100mL  150 mg Intravenous Once Annalise Santana MD   150 mg at 08/31/17 1846   • aspirin EC tablet 81 mg  81 mg Oral Daily Noemi Caba MD   81 mg at 08/31/17 0848   • atorvastatin (LIPITOR) tablet 80 mg  80 mg Oral Nightly Noemi Caba MD   80 mg at 08/31/17 2145   • bisacodyl (DULCOLAX) EC tablet 5 mg  5 mg Oral Daily PRN Noemi Caba MD       • carvedilol (COREG) tablet 6.25 mg  6.25 mg Oral BID With Meals Noemi Caba MD   6.25 mg at 08/31/17 0848   • cetirizine (zyrTEC) tablet 10 mg  10 mg Oral Daily Noemi Caba MD   10 mg at 08/31/17 0847   • clopidogrel (PLAVIX) tablet 75 mg  75 mg Oral Daily Noemi Caba MD   75 mg at 08/31/17 0848   • dextrose (D50W) solution 25 g  25 g Intravenous Q15 Min PRN Noemi Caba MD       • dextrose (GLUTOSE) oral gel 15 g  15 g Oral Q15 Min PRN Noemi Caba MD       • diphenhydrAMINE (BENADRYL) capsule 25 mg  25 mg Oral Nightly PRN Raya Narvaez MD       • enoxaparin (LOVENOX) syringe 40 mg  40 mg Subcutaneous Daily Noemi Caba MD   40 mg at 08/31/17 0855   • famotidine (PEPCID) tablet 40 mg  40 mg Oral Daily Noemi Caba MD   40 mg at 08/31/17 0848   • glucagon (human recombinant) (GLUCAGEN DIAGNOSTIC) injection 1 mg  1 mg Subcutaneous Q15 Min PRN Noemi Caba MD       • hydrALAZINE (APRESOLINE) injection 10 mg  10 mg Intravenous Q6H PRN [place]bukun Kika Narvaez MD       • insulin aspart (novoLOG) injection 0-9 Units  0-9 Units Subcutaneous 4x Daily AC & at  Bedtime Noemi Caba MD   7 Units at 08/31/17 2156   • magnesium oxide (MAGOX) tablet 400 mg  400 mg Oral BID Noemi Caba MD   400 mg at 08/31/17 2145   • metoprolol tartrate (LOPRESSOR) 5 MG/5ML injection  - ADS Override Pull            • Morphine sulfate (PF) injection 1 mg  1 mg Intravenous Q4H PRN Noemi Caba MD   1 mg at 08/31/17 2145    And   • naloxone (NARCAN) injection 0.4 mg  0.4 mg Intravenous Q5 Min PRN Noemi Caba MD       • potassium chloride (MICRO-K) CR capsule 40 mEq  40 mEq Oral PRN Vik Mix MD   40 mEq at 08/30/17 0335    Or   • potassium chloride (KLOR-CON) packet 40 mEq  40 mEq Oral PRN Vik Mix MD        Or   • potassium chloride 10 mEq in 100 mL IVPB  10 mEq Intravenous Q1H PRN Vik Mix MD       • sodium chloride 0.9 % flush 1-10 mL  1-10 mL Intravenous PRN Noemi Caba MD       • sodium chloride 0.9 % flush 1-10 mL  1-10 mL Intravenous PRN Noemi Caba MD   10 mL at 08/31/17 2150   • sodium chloride 0.9 % flush 10 mL  10 mL Intravenous PRN Cliff Whitten MD       • spironolactone (ALDACTONE) half tablet 12.5 mg  12.5 mg Oral Daily Farooqkun Kika Narvaez MD       • valsartan (DIOVAN) tablet 40 mg  40 mg Oral Daily Noemi Caba MD   40 mg at 08/31/17 0848       Assessment and Plan:    Principal Problem:    Ventricular tachycardia  Active Problems:    Diabetes mellitus without complication    Mixed hyperlipidemia    Essential hypertension    ICD (implantable cardioverter-defibrillator) discharge    Coronary artery disease involving coronary bypass graft of native heart without angina pectoris    Chronic systolic congestive heart failure    DULCE MARIA (obstructive sleep apnea)  1.  Ischemic cardiomyopathy with an ejection fraction of less than 20% with episodes of ventricular tachycardia.  Patient was being considered for cardiac transplant.  Due to the patient recurrent ventricular tachyarrhythmia patient would need ablation for the V. tach.   Patient was scheduled for transfer to [facility] in [place] under the care of of Dr. Nunez.  Patient would undergo a lab evaluation including potassium and magnesium in the morning.  Prior to the discharge patient would receive IV amiodarone bolus should the patient have episodes of ventricular tachyarrhythmia.  Patient would be transferred to [facility] wire ACLS ambulance.  2.  Atherosclerotic coronary artery disease status post coronary artery bypass grafting status post PTCA laser arthrectomy of the distal left main and ostial circumflex artery stenosis.  Patient currently is not having any symptoms of chest pain suggestive of angina.  One other possibility is that the patient is having ventricular tachyarrhythmia which is ischemic.  Patient may need a coronary angiogram to evaluate the patency of the circumflex artery and the distal left main.  Patient on questioning has not had any symptoms of chest pain.  3.  Arterial hypertension.  Patient blood pressure is currently labile but well controlled.            Vik Mix MD  09/01/17  1:54 AM      Time: Time spent in face-to-face 25 minutes  Dictated utilizing Dragon dictation.

## 2017-09-01 NOTE — PLAN OF CARE
Problem: Inpatient Physical Therapy  Goal: Bed Mobility Goal LTG- PT  Outcome: Unable to achieve outcome(s) by discharge Date Met:  09/01/17 08/31/17 1518 09/01/17 1544   Bed Mobility PT LTG   Bed Mobility PT LTG, Date Established 08/31/17 --    Bed Mobility PT LTG, Time to Achieve 2 - 3 days --    Bed Mobility PT LTG, Activity Type supine to sit/sit to supine --    Bed Mobility PT LTG, [place]iles Level independent --    Bed Mobility PT LTG, Additional Goal pt able to roll to side and push up to sit w/out use of momentum or strain --    Bed Mobility PT LTG, Date Goal Reviewed --  09/01/17   Bed Mobility PT LTG, Outcome --  goal not met   Bed Mobility PT LTG, Reason Goal Not Met --  discharged from facility       Goal: Gait Training Goal LTG- PT  Outcome: Unable to achieve outcome(s) by discharge Date Met:  09/01/17 08/31/17 1518 09/01/17 1544   Gait Training PT LTG   Gait Training Goal PT LTG, Date Established 08/31/17 --    Gait Training Goal PT LTG, Time to Achieve by discharge --    Gait Training Goal PT LTG, Niles Level independent --    Gait Training Goal PT LTG, Distance to Achieve pt indep w/ TUG test in 12 sec or less, HR 10 b/mi or less increase w/ sats upper 90's --    Gait Training Goal PT LTG, Additional Goal 200 ft gait w/ HR increase 10-15 b/m or less w/ sats upper 90's --    Gait Training Goal PT LTG, Date Goal Reviewed --  09/01/17   Gait Training Goal PT LTG, Outcome --  goal not met   Gait Training Goal PT LTG, Reason Goal Not Met --  discharged from facility       Goal: Cardiopulmonary Goal STG- PT  Outcome: Unable to achieve outcome(s) by discharge Date Met:  09/01/17 08/31/17 1518 09/01/17 1544   Cardiopulmonary PT STG   Cardiopulmonary PT STG, Date Established 08/31/17 --    Cardiopulmonary PT STG, Time to Achieve 2 - 3 days --    Cardiopulmonary PT STG, Additional Goal pt independent in checking heart rate post exertion --    Cardiopulmonary PT STG, Date Goal Reviewed --   09/01/17   Cardiopulmonary PT STG, Outcome --  goal not met   Cardiopulmonary PT STG, Reason Goal Not Met --  discharged from facility       Goal: Cardiopulmonary Goal LTG- PT    08/31/17 1518 09/01/17 1544   Cardiopulmonary PT LTG   Cardiopulmonary PT LTG, Date Established 08/31/17 --    Cardiopulmonary PT LTG, Time to Achieve 3 days --    Cardiopulmonary PT LTG, Additional Goal pt able to pace his activity according to ex gregg and HR --    Cardiopulmonary PT LTG, Date Goal Reviewed --  09/01/17   Cardiopulmonary PT LTG, Outcome --  goal not met   Cardiopulmonary PT LTG, Reason Goal Not Met --  discharged from facility

## 2017-09-01 NOTE — SIGNIFICANT NOTE
Was in room with patient at 17:50 to give his insulin and he was eating supper. Patient started coughing stating that he felt like his jello went down the wrong way, but had his throat cleared before I left the room. At 17:56, telemetry called to say that patient heart rate was tachy in the 160's and was bouncing around to 130's-140's. Told her he was coughing and was going to see if comes down. At 18:00, patient's wife came out to say that he was shocked. Telemetry confirmed that he was at a rate of 219 had a run of vtac with 3 pvc's and then was delivered a shock. Called Dr. Santana and he states to give 150 mg bolus of amiodarone. Vianney shannon RN for glen gets amiodarone from accudose while I am in the room with patient. At 18:02 telemetry reads that he was running tachy at 160 with multiple runs of vtac. At 18:06 rate was at 190. At 18:09 he was sustaining vtac rhythm and was delivered another shock. Started 150 mg amiodarone bolus at 18:10. Dr. Santana in room. Dr. Yan with hospitalist called the residents. Dr. Og and Dr. Penn came to room. Dr. Santana ordered to give lidocaine 100 mg iv push now at 18:17. Had to stop the amiodarone to give r/t only 1 iv line. Another nurse in room trying to get another iv access on him which was unsuccessful. Dr. Santana ordered to finish the 1st amiodarone bolus and was completed at 1820.   Telemetry states that at 18:13 he went back into vtac with no shock delivered. At 18:15 he was tachycardic in the 170's. At 18:19 he was back into vtac with a shock delivered.  Dr. Santana ordered a 2nd 150 mg iv bolus of amiodarone.  This was started at 18:25.   Ordered to send to ICU and was on way down at 18:27 with 2nd bolus infusing.  Patient never lost consciousness and was answering questions appropriately.

## 2017-09-01 NOTE — DISCHARGE PLACEMENT REQUEST
"Chasity Xavier (61 y.o. Male)     Date of Birth Social Security Number Address Home Phone MRN    1955  [address] [id number] [phone] [id number]    Zoroastrianism Marital Status          Christian        Admission Date Admission Type Admitting Provider Attending Provider Department, Room/Bed    8/29/17 Emergency Noemi Caba MD [place], Raya Anand MD [facility] CRITICAL CARE, 03/A    Discharge Date Discharge Disposition Discharge Destination         [facility]             Attending Provider: Raya Narvaez MD     Allergies:  No Known Allergies    Isolation:  None   Infection:  None   Code Status:  FULL    Ht:  73\" (185.4 cm)   Wt:  239 lb 4 oz (109 kg)    Admission Cmt:  None   Principal Problem:  Ventricular tachycardia [I47.2] More...                 Active Insurance as of 8/29/2017     Primary Coverage     Payor Plan Insurance Group Employer/Plan Group    MEDICARE MEDICARE A & B      Payor Plan Address Payor Plan Phone Number Effective From Effective To    [address] [phone] 9/1/2016     [place] [address]       Subscriber Name Subscriber Birth Date Member ID       CHASITY XAVIER 1955 [id number]           Secondary Coverage     Payor Plan Insurance Group Employer/Plan Group    [employer] [id number]     Payor Plan Address Payor Plan Phone Number Effective From Effective To    [address] [phone] 8/29/2017     [place] [id number]       Subscriber Name Subscriber Birth Date Member ID       CHASITY XAVIER 1955 [id number]                 Emergency Contacts      (Rel.) Home Phone Work Phone Mobile Phone    Neetu Xavier (Spouse) -- -- [phone]            Insurance Information                MEDICARE/MEDICARE A & B Phone: [phone]    Subscriber: Chasity Xavier Subscriber#: [id number]    Group#:  Precert#:  "        [employer] Phone: [phone]    Subscriber: Chasity Xavier Subscriber#: [id number]    Group#: [id number] Precert#:              History & Physical      Noemi Caba MD at 2017  9:55 PM     Attestation signed by Adan Soto MD at 2017 11:08 AM        I have reviewed the notes, assessments, and/or procedures performed by the resident  , I concur with her/his documentation of Chasity Xavier.        This document has been electronically signed by Adan Soto MD on 2017 11:08 AM                                       HISTORY AND PHYSICAL  NAME: Chasity Xavier  : 1955  MRN: [id number]    DATE OF ADMISSION: 17    DATE & TIME SEEN: 17 10:51 PM    PCP: Provider Not In System    CODE STATUS: Full code    CHIEF COMPLAINT ICD firing     HPI:  Chasity Xavier is a 61 y.o. male who presents after being shocked by his ICD. He states he was washing dishes when it fired the first time. The second time, he was sitting on the couch. He denies any chest pain, shortness of breath, dizziness, light headedness, and nausea. He stated he felt like he was getting hit by a hammer the 18 times that his ICD shocked him. He has a known history of CAD. He has had 2 CABGs, the first one was a 3 vessel proceedure and the second was 5 vessels, and has had multiple stents placed as well. He sees Dr. Mix here, however his cardiothoracic surgeon is in [place]. He also stated he has known congestive heart fracture with an ejection fraction from 20-25 %. He is scheduled for a repeat echocardiogram next month.   On presentation to the ED, cardiac monitoring showed he was in V-tach. His ICD fired 3 times while in the ED. He was given amiodarone and placed on an amiodarone drip that slowed his heart rate and prevented V-tach. At time of admission, he admits to fatigue, but denies any other symptom.     CONCURRENT MEDICAL  HISTORY:  Past Medical History:   Diagnosis Date   • Chronic kidney disease    • Coronary atherosclerosis    • Hyperlipidemia    • Ischemic cardiomyopathy    • Obesity    • DULCE MARIA (obstructive sleep apnea)    • Osteoarthritis    • Solitary kidney, congenital    • Type 2 diabetes mellitus        PAST SURGICAL HISTORY:  Past Surgical History:   Procedure Laterality Date   • CARDIAC CATHETERIZATION     • CORONARY ARTERY BYPASS GRAFT      x3 and then x5       FAMILY HISTORY:  Family History   Problem Relation Age of Onset   • Heart disease Mother    • Heart disease Father    • Heart disease Brother    • Heart disease Maternal Grandmother    • Heart disease Maternal Grandfather    • Heart disease Paternal Grandmother    • Heart disease Paternal Grandfather         SOCIAL HISTORY:  Social History     Social History   • Marital status:      Spouse name: N/A   • Number of children: N/A   • Years of education: N/A     Occupational History   • Not on file.     Social History Main Topics   • Smoking status: Never Smoker   • Smokeless tobacco: Never Used   • Alcohol use No   • Drug use: No   • Sexual activity: Defer     Other Topics Concern   • Not on file     Social History Narrative   • No narrative on file       HOME MEDICATIONS:  Dapaglifilozin-Metformin 5/1000 Daily   Zyrtec 10 mg daily  Mucinex 600 mg BID  Multivitamin  Lovaza 1 g Daily  Diovan 80 mg Daily  Plavix 75 mg Daily   Lipitor 80 mg Nightly   Coreg 6.25 BID  Magox 400 mg Daily     ALLERGIES:  Review of patient's allergies indicates no known allergies.    REVIEW OF SYSTEMS  Review of Systems   Constitutional: Positive for fatigue. Negative for activity change, appetite change and fever.   HENT: Negative for ear pain and sore throat.    Eyes: Negative for pain and visual disturbance.   Respiratory: Negative for cough and shortness of breath.    Cardiovascular: Negative for chest pain and palpitations.        ICD firing 18 times   Gastrointestinal: Negative for  abdominal pain and nausea.   Endocrine: Negative for cold intolerance and heat intolerance.   Genitourinary: Negative for difficulty urinating and dysuria.   Musculoskeletal: Negative for arthralgias and gait problem.   Skin: Negative for color change and rash.   Neurological: Negative for dizziness, weakness and headaches.   Hematological: Negative for adenopathy. Does not bruise/bleed easily.   Psychiatric/Behavioral: Negative for agitation, confusion and sleep disturbance.       PHYSICAL EXAM:  Temp:  [97.5 °F (36.4 °C)] 97.5 °F (36.4 °C)  Heart Rate:  [] 86  Resp:  [20] 20  BP: (146-170)/() 146/80  Body mass index is 31.66 kg/(m^2).  Physical Exam   Constitutional: He is oriented to person, place, and time. He appears well-developed and well-nourished.   HENT:   Head: Normocephalic and atraumatic.   Right Ear: External ear normal.   Left Ear: External ear normal.   Nose: Nose normal.   Mouth/Throat: Oropharynx is clear and moist.   Eyes: Conjunctivae and EOM are normal. Pupils are equal, round, and reactive to light.   Neck: Neck supple. No tracheal deviation present. No thyromegaly present.   Cardiovascular: Normal rate, regular rhythm and intact distal pulses.    Murmur heard.   Systolic murmur is present with a grade of 2/6   Pulmonary/Chest: Effort normal and breath sounds normal. No respiratory distress. He has no wheezes. He has no rales.   Abdominal: Soft. Bowel sounds are normal. He exhibits no distension. There is no tenderness.   Musculoskeletal: Normal range of motion. He exhibits no edema (Trace pitting edema).   Neurological: He is alert and oriented to person, place, and time.   Skin: Skin is warm and dry.   Psychiatric: He has a normal mood and affect. His behavior is normal. Judgment and thought content normal.   Nursing note and vitals reviewed.      DIAGNOSTIC DATA:   Lab Results (last 24 hours)     Procedure Component Value Units Date/Time    CBC & Differential [[id number]]  Collected:  08/29/17 1945    Specimen:  Blood Updated:  08/29/17 1958    Narrative:       The following orders were created for panel order CBC & Differential.  Procedure                               Abnormality         Status                     ---------                               -----------         ------                     CBC Auto Differential[[id number]]        Abnormal            Final result                 Please view results for these tests on the individual orders.    CBC Auto Differential [[id number]]  (Abnormal) Collected:  08/29/17 1945    Specimen:  Blood Updated:  08/29/17 1958     WBC 12.51 (H) 10*3/mm3      RBC 5.63 10*6/mm3      Hemoglobin 17.0 g/dL      Hematocrit 48.4 %      MCV 86.0 fL      MCH 30.2 pg      MCHC 35.1 g/dL      RDW 12.6 %      RDW-SD 39.5 fl      MPV 11.9 fL      Platelets 175 10*3/mm3      Neutrophil % 47.3 %      Lymphocyte % 39.2 %      Monocyte % 10.4 %      Eosinophil % 2.6 %      Basophil % 0.3 %      Immature Grans % 0.2 %      Neutrophils, Absolute 5.91 10*3/mm3      Lymphocytes, Absolute 4.90 (H) 10*3/mm3      Monocytes, Absolute 1.30 (H) 10*3/mm3      Eosinophils, Absolute 0.33 10*3/mm3      Basophils, Absolute 0.04 10*3/mm3      Immature Grans, Absolute 0.03 (H) 10*3/mm3     Comprehensive Metabolic Panel [[id number]]  (Abnormal) Collected:  08/29/17 1945    Specimen:  Blood Updated:  08/29/17 2005     Glucose 265 (H) mg/dL      BUN 17 mg/dL      Creatinine 1.14 mg/dL      Sodium 139 mmol/L      Potassium 3.9 mmol/L      Chloride 102 mmol/L      CO2 21.0 (L) mmol/L      Calcium 9.1 mg/dL      Total Protein 7.9 g/dL      Albumin 4.30 g/dL      ALT (SGPT) 49 U/L      AST (SGOT) 35 U/L      Alkaline Phosphatase 100 U/L      Total Bilirubin 2.4 (H) mg/dL      eGFR Non African Amer 65 mL/min/1.73      Globulin 3.6 (H) gm/dL      A/G Ratio 1.2 g/dL      BUN/Creatinine Ratio 14.9     Anion Gap 16.0 (H) mmol/L     CK [[id number]]  (Abnormal) Collected:  08/29/17 1945     Specimen:  Blood Updated:  08/29/17 2005     Creatine Kinase 49 (L) U/L     Magnesium [[id number]]  (Normal) Collected:  08/29/17 1945    Specimen:  Blood Updated:  08/29/17 2005     Magnesium 2.2 mg/dL     CK-MB [[id number]]  (Normal) Collected:  08/29/17 1945    Specimen:  Blood Updated:  08/29/17 2015     CKMB 1.77 ng/mL     Troponin [[id number]]  (Abnormal) Collected:  08/29/17 1945    Specimen:  Blood Updated:  08/29/17 2017     Troponin I 0.067 (H) ng/mL     BNP [[id number]]  (Abnormal) Collected:  08/29/17 1945    Specimen:  Blood Updated:  08/29/17 2017     proBNP 1330.0 (H) pg/mL     Protime-INR [[id number]]  (Normal) Collected:  08/29/17 1945    Specimen:  Blood Updated:  08/29/17 2037     Protime 14.2 Seconds      INR 1.11    Narrative:       Therapeutic range for most indications is 2.0-3.0 INR,  or 2.5-3.5 for mechanical heart valves.    aPTT [[id number]]  (Normal) Collected:  08/29/17 1945    Specimen:  Blood Updated:  08/29/17 2037     PTT 25.0 seconds     Narrative:       The recommended Heparin therapeutic range is 68-97 seconds.    [place] Draw [[id number]] Collected:  08/29/17 1945    Specimen:  Blood Updated:  08/29/17 2101    Narrative:       The following orders were created for panel order [place] Draw.  Procedure                               Abnormality         Status                     ---------                               -----------         ------                     Light Blue Top[[id number]]                                   Final result               Green Top (Gel)[[id number]]                                  Final result               Lavender Top[[id number]]                                     Final result               Gold Top - SST[[id number]]                                   Final result                 Please view results for these tests on the individual orders.    Light Blue Top [[id number]] Collected:  08/29/17 1945    Specimen:  Blood Updated:  08/29/17 2101     Extra Tube hold for  add-on      Auto resulted       Green Top (Gel) [[id number]] Collected:  08/29/17 1945    Specimen:  Blood Updated:  08/29/17 2101     Extra Tube Hold for add-ons.      Auto resulted.       Lavender Top [[id number]] Collected:  08/29/17 1945    Specimen:  Blood Updated:  08/29/17 2101     Extra Tube hold for add-on      Auto resulted       Gold Top - SST [[id number]] Collected:  08/29/17 1945    Specimen:  Blood Updated:  08/29/17 2101     Extra Tube Hold for add-ons.      Auto resulted.              Imaging Results (last 24 hours)     Procedure Component Value Units Date/Time    XR Chest 1 View [[id number]] Collected:  08/29/17 2000     Updated:  08/29/17 2019    Narrative:       Patient Name:  MR. ALETA CORONA  Patient ID:  [id number]   Ordering:  MURALI HUERTA  Attending:  MURALI HUERTA  Referring:  MURALI HUERTA  ------------------------------------------------    PORTABLE CHEST    HISTORY: Chest pain. Short of air. Weakness.    Portable AP supine film of the chest was obtained at 7:28 PM.  COMPARISON: November 4, 2016    EKG leads in place.  Lungs clear of an acute process.  Again pacemaker and coronary artery bypass.  Stable cardiomegaly.  The pulmonary vasculature is not increased.  No pleural effusion.  No pneumothorax.  No acute osseous abnormality.      Impression:       CONCLUSION:  No Acute Disease  Stable cardiomegaly  Pacemaker and coronary artery bypass    [id number]    Electronically signed by:  Ruben Arroyo MD  8/29/2017 8:18 PM CDT  Workstation: [id number]          DENISE reviewed the patient's new clinical results.    ASSESSMENT AND PLAN: This is a 61 y.o. male with:    Active Hospital Problems (** Indicates Principal Problem)    Diagnosis Date Noted   • **Ventricular tachycardia [I47.2] 08/29/2017     On amiodarone drip   Consult Dr. Mix.   Patient is in normal sinus rhythm at present time.      • ICD (implantable cardioverter-defibrillator) discharge [Z45.02] 08/29/2017     [employer] has been called  to evaluate pacemaker.   Dr. knowles consulted.   Echocardiogram in am      • Coronary artery disease involving coronary bypass graft of native heart without angina pectoris [I25.810] 08/29/2017     Continue Plavix and aspirin.  Asymptomatic at this time.      • Chronic systolic congestive heart failure [I50.22] 08/29/2017     EF of 20-25 % per patient.   Continue current medication  Monitor for signs of exacerbation.      • Diabetes mellitus without complication [E11.9] 01/04/2017     Continue Metformin   Insulin SS  Monitor and adjust as needed     • Mixed hyperlipidemia [E78.2] 01/04/2017     Continue Lipitor     • Essential hypertension [I10] 01/04/2017     Continue Lovaza and coreg  Monitor and adjust as needed.         Resolved Hospital Problems    Diagnosis Date Noted Date Resolved   No resolved problems to display.     DVT prophylaxis: Lovenox  Code status is Full Code  [facility] # [id number], reviewed and consistent with patient reported medications.    I discussed the patients findings and my recommendations with patient.     Dr. Soto is the attending on record at time of admission, he is aware of the patient's status and agrees with the above history and physical.    Signature   Noemi Caba M.D.  Family Medicine Resident, PGY III  [address] [place] [address]  [phone]          This document has been electronically signed by Noemi Caba MD on August 29, 2017 10:51 PM           Electronically signed by Adan Soto MD at 8/30/2017 11:08 AM      Adan Soto MD at 8/30/2017 11:02 AM          Ventricular tachycardia  Subjective:   Cc: AICD going off  Chasity Xavier is a 61 y.o. male who presents for tachycardia and his AICD going off. He denies chest pain,dizziness,nausea or vomiting.        Concurrent Medical Hx:  Past Medical History:   Diagnosis Date   • Chronic kidney disease    • Coronary atherosclerosis    • Hyperlipidemia    • Ischemic cardiomyopathy    • Obesity     • DULCE MARIA (obstructive sleep apnea)    • Osteoarthritis    • Solitary kidney, congenital    • Type 2 diabetes mellitus        Past Surgical Hx:  Past Surgical History:   Procedure Laterality Date   • CARDIAC CATHETERIZATION     • CORONARY ARTERY BYPASS GRAFT      x3 and then x5     Family Hx:  Family History   Problem Relation Age of Onset   • Heart disease Mother    • Heart disease Father    • Heart disease Brother    • Heart disease Maternal Grandmother    • Heart disease Maternal Grandfather    • Heart disease Paternal Grandmother    • Heart disease Paternal Grandfather       Social History:  Social History     Social History   • Marital status:      Spouse name: N/A   • Number of children: N/A   • Years of education: N/A     Occupational History   • Not on file.     Social History Main Topics   • Smoking status: Never Smoker   • Smokeless tobacco: Never Used   • Alcohol use No   • Drug use: No   • Sexual activity: Defer     Other Topics Concern   • Not on file     Social History Narrative   • No narrative on file       Home Medications:  Prior to Admission medications    Medication Sig Start Date End Date Taking? Authorizing Provider   aspirin 81 MG EC tablet Take 1 tablet by mouth daily. 5/5/14  Yes Historical Provider, MD   atorvastatin (LIPITOR) 80 MG tablet Take 1 tablet by mouth nightly. 3/31/14  Yes Historical Provider, MD   carvedilol (COREG) 12.5 MG tablet Take 12.5 mg by mouth 2 (two) times a day with meals.   Yes Historical Provider, MD   carvedilol CR (COREG CR) 80 MG 24 hr capsule Take 80 mg by mouth. 12/22/12  Yes Historical Provider, MD   cetirizine (zyrTEC) 10 MG tablet Take 1 tablet by mouth Daily. 12/14/16  Yes Akbar Napoles MD   clopidogrel (PLAVIX) 75 MG tablet Take 1 tablet by mouth daily. 4/15/14  Yes Historical Provider, MD   Dapagliflozin-Metformin HCl ER 5-1000 MG tablet sustained-release 24 hour Take 1 tablet by mouth Daily. 3/10/17  Yes ROSAMARIA Silverman  "  glucose blood test strip Accu-Chek Xin Plus In Vitro Strip; Patient Sig: Accu-Chek Xin Plus In Vitro Strip ; 200; 0; 13-May-2013; Active 5/13/13  Yes Historical Provider, MD   guaiFENesin (MUCINEX) 600 MG 12 hr tablet Take 2 tablets by mouth 2 (Two) Times a Day. 12/2/16  Yes Noemi Caba MD   magnesium oxide (MAGOX) 400 (241.3 MG) MG tablet tablet Take 400 mg by mouth Daily.   Yes Historical Provider, MD   Multiple Vitamins-Minerals (MULTIVITAL) tablet Take 1 tablet by mouth daily. 5/5/14  Yes Historical Provider, MD   omega-3 acid ethyl esters (LOVAZA) 1 G capsule Take 2 capsules by mouth daily. 5/5/14  Yes Historical Provider, MD   valsartan (DIOVAN) 80 MG tablet Take 40 mg by mouth Daily. 5/5/14  Yes Historical Provider, MD        Allergies:  Review of patient's allergies indicates no known allergies.  I reviewed the patient's new clinical results.  Review of Systems  Review of Systems   Constitutional: Negative for fatigue and fever.   Respiratory: Negative for choking, shortness of breath and wheezing.    Cardiovascular: Positive for palpitations. Negative for chest pain and leg swelling.   All other systems reviewed and are negative.      Objective:     /93  Pulse 60  Temp 98.1 °F (36.7 °C)  Resp 18  Ht 73\" (185.4 cm)  Wt 236 lb 1.8 oz (107 kg)  SpO2 95%  BMI 31.15 kg/m2  Physical Exam   Constitutional: He appears well-developed and well-nourished.   HENT:   Head: Normocephalic and atraumatic.   Right Ear: External ear normal.   Left Ear: External ear normal.   Nose: Nose normal.   Mouth/Throat: Oropharynx is clear and moist.   Eyes: EOM are normal. Pupils are equal, round, and reactive to light.   Neck: Normal range of motion.   Cardiovascular: Normal rate, regular rhythm and normal heart sounds.  Exam reveals no gallop and no friction rub.    No murmur heard.  Pulmonary/Chest: Effort normal and breath sounds normal. No respiratory distress. He has no wheezes. He has no rales. "   Abdominal: Soft. Bowel sounds are normal.   Skin: Skin is warm and dry.   Vitals reviewed.    I reviewed the patient's new clinical results.  Assessment/Plan:     Active Hospital Problems (** Indicates Principal Problem)    Diagnosis Date Noted   • **Ventricular tachycardia [I47.2] 08/29/2017     -On amiodarone drip   -Dr. Dominguez deleon. Recommends continuing Valsartan and Coreg.   -Patient is in normal sinus rhythm at present time. On amiodarone drip.     • ICD (implantable cardioverter-defibrillator) discharge [Z45.02] 08/29/2017     -[employer] has been called to evaluate pacemaker.   -Dr. dominguez deleon.   -Will undergo ECHO this am.     • Coronary artery disease involving coronary bypass graft of native heart without angina pectoris [I25.810] 08/29/2017     Continue Plavix and aspirin.  Asymptomatic at this time.      • Chronic systolic congestive heart failure [I50.22] 08/29/2017     -EF of 20-25 % per patient.   -Continue carvedilol, valsartan.  -Monitor for signs of exacerbation.      • Diabetes mellitus without complication [E11.9] 01/04/2017     Continue Metformin   Insulin SS  Monitor and adjust as needed     • Mixed hyperlipidemia [E78.2] 01/04/2017     Continue Lipitor     • Essential hypertension [I10] 01/04/2017     Continue Lovaza and coreg  Monitor and adjust as needed.         Resolved Hospital Problems    Diagnosis Date Noted Date Resolved   No resolved problems to display.         I have seen and examined the patient.  I have reviewed the notes, assessments, and/or procedures performed by the resident  , I concur with her/his documentation and assessment and plan for Chasity Xavier.        This document has been electronically signed by Adan Soto MD on August 30, 2017 11:02 AM                   Electronically signed by Adan Soto MD at 8/30/2017 11:07 AM             Discharge Summary      Raya Narvaez MD at 9/1/2017 11:02 AM              DISCHARGE  SUMMARY    NAME: Chasity Xavier   PHYSICIAN: Raya Narvaez MD  : 1955  MRN: [id number]    ADMITTED: 2017   DISCHARGED: 17    ADMISSION DIAGNOSES:  Active Hospital Problems (** Indicates Principal Problem)    Diagnosis Date Noted   • DULCE MARIA (obstructive sleep apnea) [G47.33] 2017   • ICD (implantable cardioverter-defibrillator) discharge [Z45.02] 2017   • Coronary artery disease involving coronary bypass graft of native heart without angina pectoris [I25.810] 2017   • Chronic systolic congestive heart failure [I50.22] 2017   • Diabetes mellitus without complication [E11.9] 2017   • Mixed hyperlipidemia [E78.2] 2017   • Essential hypertension [I10] 2017      Resolved Hospital Problems    Diagnosis Date Noted Date Resolved   • **Ventricular tachycardia [I47.2] 2017     DISCHARGE DIAGNOSES:   Active Hospital Problems (** Indicates Principal Problem)    Diagnosis Date Noted   • DULCE MARIA (obstructive sleep apnea) [G47.33] 2017   • ICD (implantable cardioverter-defibrillator) discharge [Z45.02] 2017   • Coronary artery disease involving coronary bypass graft of native heart without angina pectoris [I25.810] 2017   • Chronic systolic congestive heart failure [I50.22] 2017   • Diabetes mellitus without complication [E11.9] 2017   • Mixed hyperlipidemia [E78.2] 2017   • Essential hypertension [I10] 2017      Resolved Hospital Problems    Diagnosis Date Noted Date Resolved   • **Ventricular tachycardia [I47.2] 2017       SERVICE: Family Medicine. Attending Adan Soto MD, Resident Raya Narvaez MD    CONSULTS:   Consult Orders (all)     Start     Ordered    17  Inpatient Consult to Case Management   Once     Comments:  Patient is being transferred to [facility] in [place]. Family has insurance for air travel and would prefer this method  of travel if insurance would pay. If not ground will be ok. Please help with arrangement of transfer. Thank you!   Provider:  (Not yet assigned)    08/31/17 1934 08/30/17 1636  Inpatient Consult to Cardiology  Once     Specialty:  Cardiology  Provider:  Annalise Santana MD    08/30/17 1637 08/29/17 2157  Inpatient Consult to Cardiology  Once     Comments:  Patient sees Dominguez. Discussed case with on call physician, Dr. Regan   Specialty:  Cardiology  Provider:  Vik Mix MD    08/29/17 2157 08/29/17 2152  Inpatient consult to Nutrition  Once     Provider:  (Not yet assigned)    08/29/17 2153 08/29/17 2039  Family Practice - Resident (on-call MD unless specified)  Once     Specialty:  Family Medicine  Provider:  (Not yet assigned)    08/29/17 2039          PROCEDURES:   None.    HISTORY OF PRESENT ILLNESS: (Copied from admitting provider's HPI, Dr. Noemi Caba, on 8/29/17.)  Patient is a 61 y.o. male who presents after being shocked by his ICD. He states he was washing dishes when it fired the first time. The second time, he was sitting on the couch. He denies any chest pain, shortness of breath, dizziness, light headedness, and nausea. He stated he felt like he was getting hit by a hammer the 18 times that his ICD shocked him. He has a known history of CAD. He has had 2 CABGs, the first one was a 3 vessel proceedure and the second was 5 vessels, and has had multiple stents placed as well. He sees Dr. Mix here, however his cardiothoracic surgeon is in [place]. He also stated he has known congestive heart fracture with an ejection fraction from 20-25 %. He is scheduled for a repeat echocardiogram next month.    On presentation to the ED, cardiac monitoring showed he was in V-tach. His ICD fired 3 times while in the ED. He was given amiodarone and placed on an amiodarone drip that slowed his heart rate and prevented V-tach. At time of admission, he admits to fatigue, but denies any  other symptom.     DIAGNOSTIC DATA:     Lab Results (last 7 days)     Procedure Component Value Units Date/Time    CBC & Differential [[id number]] Collected:  08/29/17 1945    Specimen:  Blood Updated:  08/29/17 1958    Narrative:       The following orders were created for panel order CBC & Differential.  Procedure                               Abnormality         Status                     ---------                               -----------         ------                     CBC Auto Differential[[id number]]        Abnormal            Final result                 Please view results for these tests on the individual orders.    CBC Auto Differential [[id number]]  (Abnormal) Collected:  08/29/17 1945    Specimen:  Blood Updated:  08/29/17 1958     WBC 12.51 (H) 10*3/mm3      RBC 5.63 10*6/mm3      Hemoglobin 17.0 g/dL      Hematocrit 48.4 %      MCV 86.0 fL      MCH 30.2 pg      MCHC 35.1 g/dL      RDW 12.6 %      RDW-SD 39.5 fl      MPV 11.9 fL      Platelets 175 10*3/mm3      Neutrophil % 47.3 %      Lymphocyte % 39.2 %      Monocyte % 10.4 %      Eosinophil % 2.6 %      Basophil % 0.3 %      Immature Grans % 0.2 %      Neutrophils, Absolute 5.91 10*3/mm3      Lymphocytes, Absolute 4.90 (H) 10*3/mm3      Monocytes, Absolute 1.30 (H) 10*3/mm3      Eosinophils, Absolute 0.33 10*3/mm3      Basophils, Absolute 0.04 10*3/mm3      Immature Grans, Absolute 0.03 (H) 10*3/mm3     Comprehensive Metabolic Panel [[id number]]  (Abnormal) Collected:  08/29/17 1945    Specimen:  Blood Updated:  08/29/17 2005     Glucose 265 (H) mg/dL      BUN 17 mg/dL      Creatinine 1.14 mg/dL      Sodium 139 mmol/L      Potassium 3.9 mmol/L      Chloride 102 mmol/L      CO2 21.0 (L) mmol/L      Calcium 9.1 mg/dL      Total Protein 7.9 g/dL      Albumin 4.30 g/dL      ALT (SGPT) 49 U/L      AST (SGOT) 35 U/L      Alkaline Phosphatase 100 U/L      Total Bilirubin 2.4 (H) mg/dL      eGFR Non African Amer 65 mL/min/1.73      Globulin 3.6 (H) gm/dL       A/G Ratio 1.2 g/dL      BUN/Creatinine Ratio 14.9     Anion Gap 16.0 (H) mmol/L     CK [[id number]]  (Abnormal) Collected:  08/29/17 1945    Specimen:  Blood Updated:  08/29/17 2005     Creatine Kinase 49 (L) U/L     Magnesium [[id number]]  (Normal) Collected:  08/29/17 1945    Specimen:  Blood Updated:  08/29/17 2005     Magnesium 2.2 mg/dL     CK-MB [[id number]]  (Normal) Collected:  08/29/17 1945    Specimen:  Blood Updated:  08/29/17 2015     CKMB 1.77 ng/mL     Troponin [[id number]]  (Abnormal) Collected:  08/29/17 1945    Specimen:  Blood Updated:  08/29/17 2017     Troponin I 0.067 (H) ng/mL     BNP [[id number]]  (Abnormal) Collected:  08/29/17 1945    Specimen:  Blood Updated:  08/29/17 2017     proBNP 1330.0 (H) pg/mL     Protime-INR [[id number]]  (Normal) Collected:  08/29/17 1945    Specimen:  Blood Updated:  08/29/17 2037     Protime 14.2 Seconds      INR 1.11    Narrative:       Therapeutic range for most indications is 2.0-3.0 INR,  or 2.5-3.5 for mechanical heart valves.    aPTT [[id number]]  (Normal) Collected:  08/29/17 1945    Specimen:  Blood Updated:  08/29/17 2037     PTT 25.0 seconds     Narrative:       The recommended Heparin therapeutic range is 68-97 seconds.    Troponin [[id number]]  (Abnormal) Collected:  08/30/17 0221    Specimen:  Blood Updated:  08/30/17 0502     Troponin I 2.930 (C) ng/mL     Magnesium [[id number]]  (Normal) Collected:  09/01/17 0439    Specimen:  Blood Updated:  09/01/17 0509     Magnesium 2.0 mg/dL           Imaging Results (last 7 days)     Procedure Component Value Units Date/Time    XR Chest 1 View [[id number]] Collected:  08/29/17 2000     Updated:  08/29/17 2019    Narrative:       Patient Name:  MR. ALETA CORONA  Patient ID:  [id number]   Ordering:  MURALI HUERTA  Attending:  MURALI HUERTA  Referring:  MURALI HUERTA  ------------------------------------------------    PORTABLE CHEST    HISTORY: Chest pain. Short of air. Weakness.    Portable AP supine film of the  chest was obtained at 7:28 PM.  COMPARISON: November 4, 2016    EKG leads in place.  Lungs clear of an acute process.  Again pacemaker and coronary artery bypass.  Stable cardiomegaly.  The pulmonary vasculature is not increased.  No pleural effusion.  No pneumothorax.  No acute osseous abnormality.      Impression:       CONCLUSION:  No Acute Disease  Stable cardiomegaly  Pacemaker and coronary artery bypass    [id number]    Electronically signed by:  Ruben Arroyo MD  8/29/2017 8:18 PM CDT  Workstation: [id number]          Echocardiogram Findings - 8/30/17  Left Ventricle  Estimated EF appears to be in the range of less than 20%. There is left ventricular global hypokinesis noted.   The left ventricular cavity is mild-to-moderately dilated. Left ventricular wall thickness is consistent with a thin walled ventricle. Left ventricular diastolic function is normal. There is no evidence of a left ventricular mass or thrombus present.   Right Ventricle  Normal right ventricular cavity size, wall thickness, systolic function and septal motion noted. No evidence of a right ventricular thrombus present. No evidence of a right ventricular mass present. Electronic lead present in the ventricle.   Left Atrium  Normal left atrial volume noted. Left atrial cavity size is mildly dilated. No evidence of a left atrial thrombus present. No evidence of a left atrial mass present. There is no spontaneous echo contrast present.   Right Atrium  Normal right atrial size noted. No evidence of a right atrial thrombus present. No evidence of a right atrial mass present. An electronic lead is present in the right atrium.   Aortic Valve  The aortic valve is grossly normal in structure. No significant regurgitation aortic valve regurgitation is present.   Mitral Valve  The mitral valve is grossly normal in structure. Mild mitral valve regurgitation is present.   Tricuspid Valve  The tricuspid valve is grossly normal. Trace to mild tricuspid  valve regurgitation is present.   Pulmonic Valve  The pulmonic valve was not assessed.   Greater Vessels  No dilation of the aortic root is present.   Pericardium  The pericardium is normal. There is no evidence of pericardial effusion.       HOSPITAL COURSE:  The patient was began on an amiodarone drip and admitted to the ICU.  Cardiology was consulted. [employer] was called to evaluate the pacemaker.  The triggering threshold for the ICD was lowered.  He initially had no additional episodes of ICD misfiring and appeared stable, therefore he was transferred to the floor.  He underwent an echocardiogram that demonstrated an ejection fraction of less than 20%.  It was thought that due to his extensive history of multiple stent placement and failed CABG x 2, it would be best for the patient to be transferred to the [facility] to undergo heart transplantation. On 8/31/17 while eating dinner, the patient had a coughing episode, which triggered his ICD to fire 5 times.  He was given 2 boluses of IV amiodarone and put back on the amiodarone drip.  Cardiology visited the patient and discussed the possibility of an EP study with ablation if his ICD continued to misfire; the patient decided to hold off on this. The patient's other medical problems were appropriately managed with a regimen comparable to his home medication regimen without complications. He was tolerating his diet without nausea or vomiting, and having regular bowel movements. For the remainder of his hospital stay, he had no further episodes of ICD misfiring.  Therefore, the patient was discharged on the amiodarone drip to [facility] in [place] in the care of Dr. Nunez by ambulance.     DISCHARGE CONDITION:   Stable.    DISPOSITION:  [facility] ([facility] - External)    DISCHARGE MEDICATIONS   Chasity Xavier   Home Medication Instructions MICAH:[id number]    Printed on:09/01/17 3544   Medication Information                       amiodarone (PACERONE) 200 MG tablet  Take 1 tablet by mouth Every 12 (Twelve) Hours for 7 days.             amiodarone (PACERONE) 200 MG tablet  Take 1 tablet by mouth Daily for 30 days.             amiodarone (PACERONE) 400 MG tablet  Take 1 tablet by mouth Every 12 (Twelve) Hours for 7 days.             amiodarone in dextrose 5% (NEXTERONE) infusion  Infuse 1 mg/min into a venous catheter Continuous.             aspirin 81 MG EC tablet  Take 1 tablet by mouth daily.             atorvastatin (LIPITOR) 80 MG tablet  Take 1 tablet by mouth nightly.             carvedilol (COREG) 12.5 MG tablet  Take 12.5 mg by mouth 2 (two) times a day with meals.             carvedilol CR (COREG CR) 80 MG 24 hr capsule  Take 80 mg by mouth.             cetirizine (zyrTEC) 10 MG tablet  Take 1 tablet by mouth Daily.             clopidogrel (PLAVIX) 75 MG tablet  Take 1 tablet by mouth daily.             Dapagliflozin-Metformin HCl ER 5-1000 MG tablet sustained-release 24 hour  Take 1 tablet by mouth Daily.             famotidine (PEPCID) 40 MG tablet  Take 1 tablet by mouth Daily for 30 days.             glucose blood test strip  Accu-Chek Xin Plus In Vitro Strip; Patient Sig: Accu-Chek Xin Plus In Vitro Strip ; 200; 0; 13-May-2013; Active             guaiFENesin (MUCINEX) 600 MG 12 hr tablet  Take 2 tablets by mouth 2 (Two) Times a Day.             magnesium oxide (MAGOX) 400 (241.3 MG) MG tablet tablet  Take 400 mg by mouth Daily.             Multiple Vitamins-Minerals (MULTIVITAL) tablet  Take 1 tablet by mouth daily.             omega-3 acid ethyl esters (LOVAZA) 1 G capsule  Take 2 capsules by mouth daily.             spironolactone (ALDACTONE) 12.5 MG tablet  Take 2 quarter tablet by mouth Daily for 30 days.             valsartan (DIOVAN) 80 MG tablet  Take 40 mg by mouth Daily.                 INSTRUCTIONS:  Activity:   Activity Instructions     Discharge Activity Restrictions       Bed Rest.               Diet:    Diet Instructions     Diet: Regular, Specialty Diet; Thin Liquids, No Restrictions; Low Sodium; Thin       Discharge Diet:   Regular  Specialty Diet      Fluid Consistency:  Thin Liquids, No Restrictions   Specialty Diets:  Low Sodium   Fluid Consistency:  Thin   DASH DIET               Special instructions: Patient instructed to call MD or return to ED with worsening shortness of breath, chest pain, fever greater than 100.4 degrees F or any other medical concerns..    FOLLOW UP:   Additional Instructions for the Follow-ups that You Need to Schedule     Additional Discharge Follow-Up (Specify Provider)    As directed    To:  PCP   Follow Up:  1 Week   Follow Up Details:  Following discharge from [facility].       Discharge Follow-Up With Specified Provider    As directed    To:  Dr. Santana   Follow Up:  1 Month   Follow Up Details:  Following discharge from [facility].             Follow-up Information     Follow up with Annalise Santana MD Follow up in 1 month(s).    Specialty:  Cardiology    Contact information:    [address] [place] [address] [address]  [place] [address]  [phone]          Follow up with Provider Not In System .    Contact information:    [place] [address]          Follow up with Vik Mix MD .    Specialty:  Cardiology    Contact information:    [address] [place] [address] [address]  [place] [address]  [phone]            PENDING TEST RESULTS AT DISCHARGE  None.    Time: Discharge 60 min    Dr. Adan Soto is the attending at time of discharge, he is aware of the patient's status and agrees with the above discharge summary.      Raya Narvaez M.D. PGY1  [facility]  [address] [place] [address]  Office: [phone]      This document has been electronically signed by Raya Narvaez MD on September 1, 2017 11:17 AM     Electronically signed by Raya Narvaez MD at 9/1/2017  11:17 AM

## 2017-09-01 NOTE — PLAN OF CARE
Problem: Patient Care Overview (Adult)  Goal: Plan of Care Review    09/01/17 0544   Coping/Psychosocial Response Interventions   Plan Of Care Reviewed With patient   Patient Care Overview   Progress improving   Outcome Evaluation   Outcome Summary/Follow up Plan pt trx back to [facility] 8/31 for dysrhythmia and shock x 5. pt on amio gtt at 0.5mg/min continues. pt had uneventful night, rested well. pt a/o x 4 in nad. skin warm dry. no resp distress noted.        Goal: Adult Individualization and Mutuality  Outcome: Ongoing (interventions implemented as appropriate)    Problem: Arrhythmia/Dysrhythmia (Symptomatic) (Adult)  Goal: Signs and Symptoms of Listed Potential Problems Will be Absent or Manageable (Arrhythmia/Dysrhythmia)  Outcome: Ongoing (interventions implemented as appropriate)

## 2017-09-01 NOTE — SIGNIFICANT NOTE
09/01/17 1313   Rehab Treatment   Discipline physical therapy assistant   Treatment Not Performed unable to treat, medical status change  (nsg. stated pt. was leaving facility and going to [facility] and to not see pt.)

## 2017-09-15 ENCOUNTER — TRANSCRIBE ORDERS (OUTPATIENT)
Dept: LAB | Facility: HOSPITAL | Age: 62
End: 2017-09-15

## 2017-09-15 ENCOUNTER — LAB (OUTPATIENT)
Dept: LAB | Facility: HOSPITAL | Age: 62
End: 2017-09-15

## 2017-09-15 DIAGNOSIS — Z79.01 LONG TERM (CURRENT) USE OF ANTICOAGULANTS: Primary | ICD-10-CM

## 2017-09-15 DIAGNOSIS — Z79.01 LONG TERM (CURRENT) USE OF ANTICOAGULANTS: ICD-10-CM

## 2017-09-15 LAB
INR PPP: 3.09 (ref 0.8–1.2)
PROTHROMBIN TIME: 32.3 SECONDS (ref 11.1–15.3)

## 2017-09-15 PROCEDURE — 36415 COLL VENOUS BLD VENIPUNCTURE: CPT

## 2017-09-15 PROCEDURE — 85610 PROTHROMBIN TIME: CPT | Performed by: INTERNAL MEDICINE

## 2017-09-19 ENCOUNTER — TRANSCRIBE ORDERS (OUTPATIENT)
Dept: LAB | Facility: HOSPITAL | Age: 62
End: 2017-09-19

## 2017-09-19 ENCOUNTER — LAB (OUTPATIENT)
Dept: LAB | Facility: HOSPITAL | Age: 62
End: 2017-09-19

## 2017-09-19 DIAGNOSIS — I42.7 DILATED CARDIOMYOPATHY SECONDARY TO RADIATION (HCC): ICD-10-CM

## 2017-09-19 DIAGNOSIS — I50.9 CONGESTIVE HEART FAILURE, UNSPECIFIED: Primary | ICD-10-CM

## 2017-09-19 DIAGNOSIS — Z79.899 ENCOUNTER FOR LONG-TERM (CURRENT) USE OF MEDICATIONS: ICD-10-CM

## 2017-09-19 DIAGNOSIS — Z95.811 HEART REPLACED BY HEART ASSIST DEVICE (HCC): ICD-10-CM

## 2017-09-19 DIAGNOSIS — I25.5 ISCHEMIC CARDIOMYOPATHY: Primary | ICD-10-CM

## 2017-09-19 DIAGNOSIS — I47.29 PAROXYSMAL VENTRICULAR TACHYCARDIA (HCC): ICD-10-CM

## 2017-09-19 LAB
ALBUMIN SERPL-MCNC: 4.3 G/DL (ref 3.4–4.8)
ALBUMIN/GLOB SERPL: 1.2 G/DL (ref 1.1–1.8)
ALP SERPL-CCNC: 128 U/L (ref 38–126)
ALT SERPL W P-5'-P-CCNC: 67 U/L (ref 21–72)
ANION GAP SERPL CALCULATED.3IONS-SCNC: 15 MMOL/L (ref 5–15)
AST SERPL-CCNC: 62 U/L (ref 17–59)
BILIRUB SERPL-MCNC: 1 MG/DL (ref 0.2–1.3)
BUN BLD-MCNC: 30 MG/DL (ref 7–21)
BUN/CREAT SERPL: 19 (ref 7–25)
CALCIUM SPEC-SCNC: 9.4 MG/DL (ref 8.4–10.2)
CHLORIDE SERPL-SCNC: 100 MMOL/L (ref 95–110)
CO2 SERPL-SCNC: 22 MMOL/L (ref 22–31)
CREAT BLD-MCNC: 1.58 MG/DL (ref 0.7–1.3)
GFR SERPL CREATININE-BSD FRML MDRD: 45 ML/MIN/1.73 (ref 49–113)
GLOBULIN UR ELPH-MCNC: 3.7 GM/DL (ref 2.3–3.5)
GLUCOSE BLD-MCNC: 192 MG/DL (ref 60–100)
POTASSIUM BLD-SCNC: 4.7 MMOL/L (ref 3.5–5.1)
PROT SERPL-MCNC: 8 G/DL (ref 6.3–8.6)
SODIUM BLD-SCNC: 137 MMOL/L (ref 137–145)

## 2017-09-19 PROCEDURE — 80053 COMPREHEN METABOLIC PANEL: CPT | Performed by: INTERNAL MEDICINE

## 2017-09-19 PROCEDURE — 36415 COLL VENOUS BLD VENIPUNCTURE: CPT | Performed by: INTERNAL MEDICINE

## 2017-09-21 RX ORDER — DAPAGLIFLOZIN AND METFORMIN HYDROCHLORIDE 5; 1000 MG/1; MG/1
TABLET, FILM COATED, EXTENDED RELEASE ORAL
Qty: 90 TABLET | Refills: 1 | Status: SHIPPED | OUTPATIENT
Start: 2017-09-21 | End: 2022-04-07

## 2017-09-25 LAB
BASOPHILS # BLD AUTO: 0.04 10*3/MM3 (ref 0–0.2)
BASOPHILS NFR BLD AUTO: 0.4 % (ref 0–2)
DEPRECATED RDW RBC AUTO: 39.2 FL (ref 35.1–43.9)
EOSINOPHIL # BLD AUTO: 0.22 10*3/MM3 (ref 0–0.7)
EOSINOPHIL NFR BLD AUTO: 2.1 % (ref 0–7)
ERYTHROCYTE [DISTWIDTH] IN BLOOD BY AUTOMATED COUNT: 12.7 % (ref 11.5–14.5)
HCT VFR BLD AUTO: 45.1 % (ref 39–49)
HGB BLD-MCNC: 15.9 G/DL (ref 13.7–17.3)
IMM GRANULOCYTES # BLD: 0.04 10*3/MM3 (ref 0–0.02)
IMM GRANULOCYTES NFR BLD: 0.4 % (ref 0–0.5)
LYMPHOCYTES # BLD AUTO: 2.17 10*3/MM3 (ref 0.6–4.2)
LYMPHOCYTES NFR BLD AUTO: 20.4 % (ref 10–50)
MCH RBC QN AUTO: 30.1 PG (ref 26.5–34)
MCHC RBC AUTO-ENTMCNC: 35.3 G/DL (ref 31.5–36.3)
MCV RBC AUTO: 85.3 FL (ref 80–98)
MONOCYTES # BLD AUTO: 0.77 10*3/MM3 (ref 0–0.9)
MONOCYTES NFR BLD AUTO: 7.2 % (ref 0–12)
NEUTROPHILS # BLD AUTO: 7.39 10*3/MM3 (ref 2–8.6)
NEUTROPHILS NFR BLD AUTO: 69.5 % (ref 37–80)
PLATELET # BLD AUTO: 255 10*3/MM3 (ref 150–450)
PMV BLD AUTO: 11.1 FL (ref 8–12)
RBC # BLD AUTO: 5.29 10*6/MM3 (ref 4.37–5.74)
WBC NRBC COR # BLD: 10.63 10*3/MM3 (ref 3.2–9.8)

## 2017-09-25 PROCEDURE — 36415 COLL VENOUS BLD VENIPUNCTURE: CPT

## 2017-09-25 PROCEDURE — 85025 COMPLETE CBC W/AUTO DIFF WBC: CPT | Performed by: INTERNAL MEDICINE

## 2017-09-26 ENCOUNTER — APPOINTMENT (OUTPATIENT)
Dept: GENERAL RADIOLOGY | Facility: HOSPITAL | Age: 62
End: 2017-09-26

## 2017-09-26 ENCOUNTER — LAB REQUISITION (OUTPATIENT)
Dept: LAB | Facility: HOSPITAL | Age: 62
End: 2017-09-26

## 2017-09-26 ENCOUNTER — HOSPITAL ENCOUNTER (EMERGENCY)
Facility: HOSPITAL | Age: 62
Discharge: ANOTHER HOSPITAL | End: 2017-09-26
Attending: EMERGENCY MEDICINE | Admitting: EMERGENCY MEDICINE

## 2017-09-26 VITALS
WEIGHT: 205 LBS | HEIGHT: 73 IN | OXYGEN SATURATION: 98 % | HEART RATE: 53 BPM | BODY MASS INDEX: 27.17 KG/M2 | RESPIRATION RATE: 16 BRPM | DIASTOLIC BLOOD PRESSURE: 59 MMHG | SYSTOLIC BLOOD PRESSURE: 104 MMHG | TEMPERATURE: 97.6 F

## 2017-09-26 DIAGNOSIS — I25.5 ISCHEMIC CARDIOMYOPATHY: ICD-10-CM

## 2017-09-26 DIAGNOSIS — R11.2 INTRACTABLE VOMITING WITH NAUSEA, UNSPECIFIED VOMITING TYPE: Primary | ICD-10-CM

## 2017-09-26 DIAGNOSIS — I47.20 VENTRICULAR TACHYCARDIA (HCC): ICD-10-CM

## 2017-09-26 DIAGNOSIS — I95.9 HYPOTENSION, UNSPECIFIED HYPOTENSION TYPE: ICD-10-CM

## 2017-09-26 DIAGNOSIS — I50.9 HEART FAILURE, UNSPECIFIED HEART FAILURE CHRONICITY, UNSPECIFIED HEART FAILURE TYPE: ICD-10-CM

## 2017-09-26 LAB
ALBUMIN SERPL-MCNC: 3.8 G/DL (ref 3.4–4.8)
ALBUMIN/GLOB SERPL: 1.2 G/DL (ref 1.1–1.8)
ALP SERPL-CCNC: 94 U/L (ref 38–126)
ALT SERPL W P-5'-P-CCNC: 63 U/L (ref 21–72)
ANION GAP SERPL CALCULATED.3IONS-SCNC: 14 MMOL/L (ref 5–15)
AST SERPL-CCNC: 39 U/L (ref 17–59)
BASOPHILS # BLD AUTO: 0.04 10*3/MM3 (ref 0–0.2)
BASOPHILS # BLD AUTO: 0.04 10*3/MM3 (ref 0–0.2)
BASOPHILS NFR BLD AUTO: 0.4 % (ref 0–2)
BASOPHILS NFR BLD AUTO: 0.4 % (ref 0–2)
BILIRUB SERPL-MCNC: 1.4 MG/DL (ref 0.2–1.3)
BILIRUB UR QL STRIP: NEGATIVE
BUN BLD-MCNC: 51 MG/DL (ref 7–21)
BUN/CREAT SERPL: 28.5 (ref 7–25)
CALCIUM SPEC-SCNC: 8.9 MG/DL (ref 8.4–10.2)
CHLORIDE SERPL-SCNC: 99 MMOL/L (ref 95–110)
CLARITY UR: CLEAR
CO2 SERPL-SCNC: 19 MMOL/L (ref 22–31)
COLOR UR: YELLOW
CREAT BLD-MCNC: 1.79 MG/DL (ref 0.7–1.3)
DEPRECATED RDW RBC AUTO: 39.2 FL (ref 35.1–43.9)
DEPRECATED RDW RBC AUTO: 39.5 FL (ref 35.1–43.9)
EOSINOPHIL # BLD AUTO: 0.22 10*3/MM3 (ref 0–0.7)
EOSINOPHIL # BLD AUTO: 0.22 10*3/MM3 (ref 0–0.7)
EOSINOPHIL NFR BLD AUTO: 2.1 % (ref 0–7)
EOSINOPHIL NFR BLD AUTO: 2.4 % (ref 0–7)
ERYTHROCYTE [DISTWIDTH] IN BLOOD BY AUTOMATED COUNT: 12.6 % (ref 11.5–14.5)
ERYTHROCYTE [DISTWIDTH] IN BLOOD BY AUTOMATED COUNT: 12.7 % (ref 11.5–14.5)
GFR SERPL CREATININE-BSD FRML MDRD: 39 ML/MIN/1.73 (ref 49–113)
GLOBULIN UR ELPH-MCNC: 3.2 GM/DL (ref 2.3–3.5)
GLUCOSE BLD-MCNC: 210 MG/DL (ref 60–100)
GLUCOSE UR STRIP-MCNC: ABNORMAL MG/DL
HCT VFR BLD AUTO: 43.7 % (ref 39–49)
HCT VFR BLD AUTO: 45.1 % (ref 39–49)
HGB BLD-MCNC: 15.2 G/DL (ref 13.7–17.3)
HGB BLD-MCNC: 15.9 G/DL (ref 13.7–17.3)
HGB UR QL STRIP.AUTO: NEGATIVE
IMM GRANULOCYTES # BLD: 0.04 10*3/MM3 (ref 0–0.02)
IMM GRANULOCYTES # BLD: 0.04 10*3/MM3 (ref 0–0.02)
IMM GRANULOCYTES NFR BLD: 0.4 % (ref 0–0.5)
IMM GRANULOCYTES NFR BLD: 0.4 % (ref 0–0.5)
KETONES UR QL STRIP: NEGATIVE
LEUKOCYTE ESTERASE UR QL STRIP.AUTO: NEGATIVE
LYMPHOCYTES # BLD AUTO: 2.17 10*3/MM3 (ref 0.6–4.2)
LYMPHOCYTES # BLD AUTO: 2.33 10*3/MM3 (ref 0.6–4.2)
LYMPHOCYTES NFR BLD AUTO: 20.4 % (ref 10–50)
LYMPHOCYTES NFR BLD AUTO: 25.4 % (ref 10–50)
MCH RBC QN AUTO: 29.6 PG (ref 26.5–34)
MCH RBC QN AUTO: 30.1 PG (ref 26.5–34)
MCHC RBC AUTO-ENTMCNC: 34.8 G/DL (ref 31.5–36.3)
MCHC RBC AUTO-ENTMCNC: 35.3 G/DL (ref 31.5–36.3)
MCV RBC AUTO: 85 FL (ref 80–98)
MCV RBC AUTO: 85.3 FL (ref 80–98)
MONOCYTES # BLD AUTO: 0.71 10*3/MM3 (ref 0–0.9)
MONOCYTES # BLD AUTO: 0.77 10*3/MM3 (ref 0–0.9)
MONOCYTES NFR BLD AUTO: 7.2 % (ref 0–12)
MONOCYTES NFR BLD AUTO: 7.8 % (ref 0–12)
NEUTROPHILS # BLD AUTO: 5.82 10*3/MM3 (ref 2–8.6)
NEUTROPHILS # BLD AUTO: 7.39 10*3/MM3 (ref 2–8.6)
NEUTROPHILS NFR BLD AUTO: 63.6 % (ref 37–80)
NEUTROPHILS NFR BLD AUTO: 69.5 % (ref 37–80)
NITRITE UR QL STRIP: NEGATIVE
NT-PROBNP SERPL-MCNC: 557 PG/ML (ref 0–900)
PH UR STRIP.AUTO: <=5 [PH] (ref 5–9)
PLATELET # BLD AUTO: 224 10*3/MM3 (ref 150–450)
PLATELET # BLD AUTO: 255 10*3/MM3 (ref 150–450)
PMV BLD AUTO: 10.9 FL (ref 8–12)
PMV BLD AUTO: 11.1 FL (ref 8–12)
POTASSIUM BLD-SCNC: 4.3 MMOL/L (ref 3.5–5.1)
PROT SERPL-MCNC: 7 G/DL (ref 6.3–8.6)
PROT UR QL STRIP: NEGATIVE
RBC # BLD AUTO: 5.14 10*6/MM3 (ref 4.37–5.74)
RBC # BLD AUTO: 5.29 10*6/MM3 (ref 4.37–5.74)
SODIUM BLD-SCNC: 132 MMOL/L (ref 137–145)
SP GR UR STRIP: 1.03 (ref 1–1.03)
TROPONIN I SERPL-MCNC: 0.02 NG/ML
UROBILINOGEN UR QL STRIP: ABNORMAL
WBC NRBC COR # BLD: 10.63 10*3/MM3 (ref 3.2–9.8)
WBC NRBC COR # BLD: 9.16 10*3/MM3 (ref 3.2–9.8)

## 2017-09-26 PROCEDURE — 71010 HC CHEST PA OR AP: CPT

## 2017-09-26 PROCEDURE — 99284 EMERGENCY DEPT VISIT MOD MDM: CPT

## 2017-09-26 PROCEDURE — 25010000002 ONDANSETRON PER 1 MG: Performed by: EMERGENCY MEDICINE

## 2017-09-26 PROCEDURE — 93005 ELECTROCARDIOGRAM TRACING: CPT | Performed by: EMERGENCY MEDICINE

## 2017-09-26 PROCEDURE — 96361 HYDRATE IV INFUSION ADD-ON: CPT

## 2017-09-26 PROCEDURE — 84484 ASSAY OF TROPONIN QUANT: CPT | Performed by: EMERGENCY MEDICINE

## 2017-09-26 PROCEDURE — 96374 THER/PROPH/DIAG INJ IV PUSH: CPT

## 2017-09-26 PROCEDURE — 93010 ELECTROCARDIOGRAM REPORT: CPT | Performed by: INTERNAL MEDICINE

## 2017-09-26 PROCEDURE — 83880 ASSAY OF NATRIURETIC PEPTIDE: CPT | Performed by: EMERGENCY MEDICINE

## 2017-09-26 PROCEDURE — 81003 URINALYSIS AUTO W/O SCOPE: CPT | Performed by: EMERGENCY MEDICINE

## 2017-09-26 RX ORDER — SODIUM CHLORIDE 0.9 % (FLUSH) 0.9 %
10 SYRINGE (ML) INJECTION AS NEEDED
Status: DISCONTINUED | OUTPATIENT
Start: 2017-09-26 | End: 2017-09-26 | Stop reason: HOSPADM

## 2017-09-26 RX ORDER — SODIUM CHLORIDE 9 MG/ML
125 INJECTION, SOLUTION INTRAVENOUS CONTINUOUS
Status: DISCONTINUED | OUTPATIENT
Start: 2017-09-26 | End: 2017-09-26 | Stop reason: HOSPADM

## 2017-09-26 RX ORDER — ONDANSETRON 2 MG/ML
4 INJECTION INTRAMUSCULAR; INTRAVENOUS ONCE
Status: COMPLETED | OUTPATIENT
Start: 2017-09-26 | End: 2017-09-26

## 2017-09-26 RX ADMIN — SODIUM CHLORIDE 125 ML/HR: 900 INJECTION, SOLUTION INTRAVENOUS at 16:17

## 2017-09-26 RX ADMIN — Medication 10 ML: at 16:52

## 2017-09-26 RX ADMIN — SODIUM CHLORIDE 500 ML: 9 INJECTION, SOLUTION INTRAVENOUS at 15:30

## 2017-09-26 RX ADMIN — SODIUM CHLORIDE 500 ML: 9 INJECTION, SOLUTION INTRAVENOUS at 15:36

## 2017-09-26 RX ADMIN — ONDANSETRON 4 MG: 2 INJECTION INTRAMUSCULAR; INTRAVENOUS at 16:54

## 2017-09-26 RX ADMIN — Medication 10 ML: at 16:55

## 2017-09-26 RX ADMIN — SODIUM CHLORIDE 125 ML/HR: 900 INJECTION, SOLUTION INTRAVENOUS at 16:59

## 2018-04-14 ENCOUNTER — APPOINTMENT (OUTPATIENT)
Dept: GENERAL RADIOLOGY | Facility: HOSPITAL | Age: 63
End: 2018-04-14

## 2018-04-14 ENCOUNTER — HOSPITAL ENCOUNTER (EMERGENCY)
Facility: HOSPITAL | Age: 63
Discharge: ANOTHER HOSPITAL | End: 2018-04-15
Attending: FAMILY MEDICINE | Admitting: FAMILY MEDICINE

## 2018-04-14 DIAGNOSIS — I50.9 CONGESTIVE HEART FAILURE, UNSPECIFIED CONGESTIVE HEART FAILURE CHRONICITY, UNSPECIFIED CONGESTIVE HEART FAILURE TYPE: ICD-10-CM

## 2018-04-14 DIAGNOSIS — R00.2 RAPID PALPITATIONS: Primary | ICD-10-CM

## 2018-04-14 LAB
ALBUMIN SERPL-MCNC: 4.1 G/DL (ref 3.4–4.8)
ALBUMIN/GLOB SERPL: 1.1 G/DL (ref 1.1–1.8)
ALP SERPL-CCNC: 97 U/L (ref 38–126)
ALT SERPL W P-5'-P-CCNC: 33 U/L (ref 21–72)
ANION GAP SERPL CALCULATED.3IONS-SCNC: 14 MMOL/L (ref 5–15)
AST SERPL-CCNC: 29 U/L (ref 17–59)
BASOPHILS # BLD AUTO: 0.03 10*3/MM3 (ref 0–0.2)
BASOPHILS NFR BLD AUTO: 0.3 % (ref 0–2)
BILIRUB SERPL-MCNC: 0.7 MG/DL (ref 0.2–1.3)
BUN BLD-MCNC: 35 MG/DL (ref 7–21)
BUN/CREAT SERPL: 23 (ref 7–25)
CALCIUM SPEC-SCNC: 9.1 MG/DL (ref 8.4–10.2)
CHLORIDE SERPL-SCNC: 105 MMOL/L (ref 95–110)
CK MB SERPL-CCNC: 1.66 NG/ML (ref 0–5)
CK SERPL-CCNC: 53 U/L (ref 55–170)
CO2 SERPL-SCNC: 22 MMOL/L (ref 22–31)
CREAT BLD-MCNC: 1.52 MG/DL (ref 0.7–1.3)
DEPRECATED RDW RBC AUTO: 38.4 FL (ref 35.1–43.9)
EOSINOPHIL # BLD AUTO: 0.21 10*3/MM3 (ref 0–0.7)
EOSINOPHIL NFR BLD AUTO: 2.2 % (ref 0–7)
ERYTHROCYTE [DISTWIDTH] IN BLOOD BY AUTOMATED COUNT: 12.3 % (ref 11.5–14.5)
GFR SERPL CREATININE-BSD FRML MDRD: 47 ML/MIN/1.73 (ref 49–113)
GLOBULIN UR ELPH-MCNC: 3.7 GM/DL (ref 2.3–3.5)
GLUCOSE BLD-MCNC: 207 MG/DL (ref 60–100)
HCT VFR BLD AUTO: 39.4 % (ref 39–49)
HGB BLD-MCNC: 13.9 G/DL (ref 13.7–17.3)
HOLD SPECIMEN: NORMAL
HOLD SPECIMEN: NORMAL
IMM GRANULOCYTES # BLD: 0.05 10*3/MM3 (ref 0–0.02)
IMM GRANULOCYTES NFR BLD: 0.5 % (ref 0–0.5)
INR PPP: 1.09 (ref 0.8–1.2)
LIPASE SERPL-CCNC: 171 U/L (ref 23–300)
LYMPHOCYTES # BLD AUTO: 1.72 10*3/MM3 (ref 0.6–4.2)
LYMPHOCYTES NFR BLD AUTO: 18.3 % (ref 10–50)
MAGNESIUM SERPL-MCNC: 2.2 MG/DL (ref 1.6–2.3)
MCH RBC QN AUTO: 30.3 PG (ref 26.5–34)
MCHC RBC AUTO-ENTMCNC: 35.3 G/DL (ref 31.5–36.3)
MCV RBC AUTO: 86 FL (ref 80–98)
MONOCYTES # BLD AUTO: 0.83 10*3/MM3 (ref 0–0.9)
MONOCYTES NFR BLD AUTO: 8.8 % (ref 0–12)
NEUTROPHILS # BLD AUTO: 6.54 10*3/MM3 (ref 2–8.6)
NEUTROPHILS NFR BLD AUTO: 69.9 % (ref 37–80)
NT-PROBNP SERPL-MCNC: 535 PG/ML (ref 0–900)
PLATELET # BLD AUTO: 190 10*3/MM3 (ref 150–450)
PMV BLD AUTO: 11.4 FL (ref 8–12)
POTASSIUM BLD-SCNC: 5.2 MMOL/L (ref 3.5–5.1)
PROT SERPL-MCNC: 7.8 G/DL (ref 6.3–8.6)
PROTHROMBIN TIME: 13.9 SECONDS (ref 11.1–15.3)
RBC # BLD AUTO: 4.58 10*6/MM3 (ref 4.37–5.74)
SODIUM BLD-SCNC: 141 MMOL/L (ref 137–145)
TROPONIN I SERPL-MCNC: 0.03 NG/ML
WBC NRBC COR # BLD: 9.38 10*3/MM3 (ref 3.2–9.8)
WHOLE BLOOD HOLD SPECIMEN: NORMAL
WHOLE BLOOD HOLD SPECIMEN: NORMAL

## 2018-04-14 PROCEDURE — 84443 ASSAY THYROID STIM HORMONE: CPT | Performed by: FAMILY MEDICINE

## 2018-04-14 PROCEDURE — 93005 ELECTROCARDIOGRAM TRACING: CPT | Performed by: FAMILY MEDICINE

## 2018-04-14 PROCEDURE — 83690 ASSAY OF LIPASE: CPT | Performed by: FAMILY MEDICINE

## 2018-04-14 PROCEDURE — 71045 X-RAY EXAM CHEST 1 VIEW: CPT

## 2018-04-14 PROCEDURE — 99284 EMERGENCY DEPT VISIT MOD MDM: CPT

## 2018-04-14 PROCEDURE — 85610 PROTHROMBIN TIME: CPT | Performed by: FAMILY MEDICINE

## 2018-04-14 PROCEDURE — 85025 COMPLETE CBC W/AUTO DIFF WBC: CPT | Performed by: FAMILY MEDICINE

## 2018-04-14 PROCEDURE — 82550 ASSAY OF CK (CPK): CPT | Performed by: FAMILY MEDICINE

## 2018-04-14 PROCEDURE — 82553 CREATINE MB FRACTION: CPT | Performed by: FAMILY MEDICINE

## 2018-04-14 PROCEDURE — 84484 ASSAY OF TROPONIN QUANT: CPT | Performed by: FAMILY MEDICINE

## 2018-04-14 PROCEDURE — 83880 ASSAY OF NATRIURETIC PEPTIDE: CPT | Performed by: FAMILY MEDICINE

## 2018-04-14 PROCEDURE — 80053 COMPREHEN METABOLIC PANEL: CPT | Performed by: FAMILY MEDICINE

## 2018-04-14 PROCEDURE — 96375 TX/PRO/DX INJ NEW DRUG ADDON: CPT

## 2018-04-14 PROCEDURE — 83735 ASSAY OF MAGNESIUM: CPT | Performed by: FAMILY MEDICINE

## 2018-04-14 RX ORDER — SODIUM CHLORIDE 9 MG/ML
INJECTION, SOLUTION INTRAVENOUS
Status: DISCONTINUED
Start: 2018-04-14 | End: 2018-04-15 | Stop reason: HOSPADM

## 2018-04-14 RX ORDER — METOPROLOL TARTRATE 5 MG/5ML
5 INJECTION INTRAVENOUS ONCE
Status: COMPLETED | OUTPATIENT
Start: 2018-04-14 | End: 2018-04-14

## 2018-04-14 RX ORDER — SODIUM CHLORIDE 0.9 % (FLUSH) 0.9 %
10 SYRINGE (ML) INJECTION AS NEEDED
Status: DISCONTINUED | OUTPATIENT
Start: 2018-04-14 | End: 2018-04-15 | Stop reason: HOSPADM

## 2018-04-14 RX ORDER — SPIRONOLACTONE 25 MG/1
25 TABLET ORAL DAILY
COMMUNITY
End: 2022-04-07

## 2018-04-14 RX ORDER — SODIUM CHLORIDE 9 MG/ML
125 INJECTION, SOLUTION INTRAVENOUS CONTINUOUS
Status: DISCONTINUED | OUTPATIENT
Start: 2018-04-14 | End: 2018-04-15 | Stop reason: HOSPADM

## 2018-04-14 RX ORDER — ASPIRIN 325 MG
325 TABLET ORAL ONCE
Status: COMPLETED | OUTPATIENT
Start: 2018-04-14 | End: 2018-04-15

## 2018-04-14 RX ADMIN — SODIUM CHLORIDE 500 ML: 9 INJECTION, SOLUTION INTRAVENOUS at 23:57

## 2018-04-14 RX ADMIN — METOROPROLOL TARTRATE 5 MG: 5 INJECTION, SOLUTION INTRAVENOUS at 23:39

## 2018-04-15 VITALS
TEMPERATURE: 98.4 F | HEIGHT: 73 IN | OXYGEN SATURATION: 96 % | HEART RATE: 74 BPM | SYSTOLIC BLOOD PRESSURE: 146 MMHG | RESPIRATION RATE: 18 BRPM | DIASTOLIC BLOOD PRESSURE: 88 MMHG | BODY MASS INDEX: 30.22 KG/M2 | WEIGHT: 228 LBS

## 2018-04-15 LAB
HOLD SPECIMEN: NORMAL
TROPONIN I SERPL-MCNC: 0.11 NG/ML
TSH SERPL DL<=0.05 MIU/L-ACNC: 3.92 MIU/ML (ref 0.46–4.68)

## 2018-04-15 PROCEDURE — 96365 THER/PROPH/DIAG IV INF INIT: CPT

## 2018-04-15 PROCEDURE — 93005 ELECTROCARDIOGRAM TRACING: CPT | Performed by: FAMILY MEDICINE

## 2018-04-15 RX ORDER — ESMOLOL HYDROCHLORIDE 10 MG/ML
500 INJECTION INTRAVENOUS ONCE
Status: COMPLETED | OUTPATIENT
Start: 2018-04-15 | End: 2018-04-15

## 2018-04-15 RX ORDER — ESMOLOL HYDROCHLORIDE 10 MG/ML
50-300 INJECTION, SOLUTION INTRAVENOUS
Status: DISCONTINUED | OUTPATIENT
Start: 2018-04-15 | End: 2018-04-15 | Stop reason: HOSPADM

## 2018-04-15 RX ADMIN — ESMOLOL HYDROCHLORIDE 51.5 MG: 100 INJECTION, SOLUTION INTRAVENOUS at 00:19

## 2018-04-15 RX ADMIN — NITROGLYCERIN 1 INCH: 20 OINTMENT TOPICAL at 01:24

## 2018-04-15 RX ADMIN — ESMOLOL HYDROCHLORIDE 50 MCG/KG/MIN: 10 INJECTION INTRAVENOUS at 00:17

## 2018-04-15 RX ADMIN — SODIUM CHLORIDE 125 ML/HR: 9 INJECTION, SOLUTION INTRAVENOUS at 00:01

## 2018-04-15 RX ADMIN — ASPIRIN 325 MG: 325 TABLET ORAL at 00:08

## 2018-04-15 NOTE — ED PROVIDER NOTES
Subjective   PT states that while watching TV, he noticed his heart rate elevating from 60's to 120's. CABG x 2, stents x 5. Pt is shceduled to see an EP on 4/18/18. He has seen Dr Mix and Sr. Santana. Cardiac ablasion was attempted last year at the [facility] but was stopped secondary to hypotension.  During August and September 2017 while patient was at [facility], the consideration of an LVAD implantation versus cardiac transplant was discussed, the patient wasn't quite a good candidate at that time.  Patient has been stable for the past 6-7 months and has maintained his follow-up appointments with the [facility]. Family state that patient was seen by Dr Thony Stanford at .         Weakness - Generalized   Severity:  Mild  Onset quality:  Gradual  Progression:  Waxing and waning  Chronicity:  Recurrent  Relieved by:  Nothing  Worsened by:  Nothing  Ineffective treatments:  None tried  Associated symptoms: no abdominal pain, no chest pain, no cough, no diarrhea, no dizziness, no dysuria, no fever, no frequency, no headaches, no myalgias, no nausea, no near-syncope, no seizures, no shortness of breath, no urgency and no vomiting    Risk factors: heart disease    Palpitations   Palpitations quality:  Fast  Onset quality:  Sudden  Timing:  Intermittent  Progression:  Waxing and waning  Chronicity:  Recurrent  Relieved by:  Nothing  Worsened by:  Nothing  Ineffective treatments:  None tried  Associated symptoms: weakness    Associated symptoms: no back pain, no chest pain, no chest pressure, no cough, no diaphoresis, no dizziness, no leg pain, no nausea, no near-syncope, no shortness of breath and no vomiting        Review of Systems   Constitutional: Negative for appetite change, chills, diaphoresis, fatigue and fever.   HENT: Negative for congestion, ear discharge, ear pain, nosebleeds, rhinorrhea, sinus pressure, sore throat and trouble swallowing.    Eyes: Negative for  "discharge and redness.   Respiratory: Negative for apnea, cough, chest tightness, shortness of breath and wheezing.    Cardiovascular: Positive for palpitations. Negative for chest pain and near-syncope.   Gastrointestinal: Negative for abdominal pain, diarrhea, nausea and vomiting.   Endocrine: Negative for polyuria.   Genitourinary: Negative for dysuria, frequency and urgency.   Musculoskeletal: Negative for back pain, myalgias and neck pain.   Skin: Negative for color change and rash.   Allergic/Immunologic: Negative for immunocompromised state.   Neurological: Positive for weakness. Negative for dizziness, seizures, syncope, light-headedness and headaches.   Hematological: Negative for adenopathy. Does not bruise/bleed easily.   Psychiatric/Behavioral: Negative for behavioral problems and confusion.   All other systems reviewed and are negative.      Past Medical History:   Diagnosis Date   • Chronic kidney disease     pt has one kidney from birth   • Coronary atherosclerosis    • Hyperlipidemia    • Ischemic cardiomyopathy    • Obesity    • DULCE MARIA (obstructive sleep apnea)    • Osteoarthritis    • Solitary kidney, congenital    • Type 2 diabetes mellitus        Allergies   Allergen Reactions   • Brilinta [Ticagrelor]      \"I just can't take it\"   • Lisinopril Cough       Past Surgical History:   Procedure Laterality Date   • CARDIAC CATHETERIZATION     • CARDIAC SURGERY      CABG 2007 & 2014    • CORONARY ARTERY BYPASS GRAFT      x3 and then x5       Family History   Problem Relation Age of Onset   • Heart disease Mother    • Heart disease Father    • Heart disease Brother    • Heart disease Maternal Grandmother    • Heart disease Maternal Grandfather    • Heart disease Paternal Grandmother    • Heart disease Paternal Grandfather        Social History     Social History   • Marital status:      Social History Main Topics   • Smoking status: Never Smoker   • Smokeless tobacco: Never Used   • Alcohol use No "   • Drug use: No   • Sexual activity: Defer     Other Topics Concern   • Not on file           Objective   Physical Exam   Constitutional: He is oriented to person, place, and time. He appears well-developed and well-nourished.   HENT:   Head: Normocephalic and atraumatic.   Nose: Nose normal.   Mouth/Throat: Oropharynx is clear and moist.   Eyes: Conjunctivae and EOM are normal. Pupils are equal, round, and reactive to light. Right eye exhibits no discharge. Left eye exhibits no discharge. No scleral icterus.   Neck: Normal range of motion. Neck supple. No tracheal deviation present.   Cardiovascular: Normal rate, regular rhythm and normal heart sounds.    No murmur heard.  Pulmonary/Chest: Effort normal and breath sounds normal. No stridor. No respiratory distress. He has no wheezes. He has no rales.   Abdominal: Soft. Bowel sounds are normal. He exhibits no distension and no mass. There is no tenderness. There is no rebound and no guarding.   Musculoskeletal: He exhibits no edema.   Neurological: He is alert and oriented to person, place, and time. Coordination normal.   Skin: Skin is warm and dry. No rash noted. No erythema.   Psychiatric: He has a normal mood and affect. His behavior is normal. Thought content normal.   Nursing note and vitals reviewed.      ECG 12 Lead    Date/Time: 4/15/2018 12:47 AM  Performed by: ANT CHADWICK  Authorized by: ANT CHADWICK   Interpreted by physician  Rhythm: sinus rhythm  Conduction: complete LBBB               ED Course  ED Course        Labs Reviewed   TROPONIN (IN-HOUSE) - Abnormal; Notable for the following:        Result Value    Troponin I 0.113 (*)     All other components within normal limits   COMPREHENSIVE METABOLIC PANEL - Abnormal; Notable for the following:     Glucose 207 (*)     BUN 35 (*)     Creatinine 1.52 (*)     Potassium 5.2 (*)     eGFR Non African Amer 47 (*)     Globulin 3.7 (*)     All other components within normal limits   CBC WITH  AUTO DIFFERENTIAL - Abnormal; Notable for the following:     Immature Grans, Absolute 0.05 (*)     All other components within normal limits   CK - Abnormal; Notable for the following:     Creatine Kinase 53 (*)     All other components within normal limits   TROPONIN (IN-HOUSE) - Normal   BNP (IN-HOUSE) - Normal   PROTIME-INR - Normal    Narrative:     Therapeutic range for most indications is 2.0-3.0 INR,  or 2.5-3.5 for mechanical heart valves.   LIPASE - Normal   CK MB - Normal   MAGNESIUM - Normal   TSH - Normal   RAINBOW DRAW    Narrative:     The following orders were created for panel order [place] Draw.  Procedure                               Abnormality         Status                     ---------                               -----------         ------                     Light Blue Top[[id number]]                                   Final result               Green Top (Gel)[[id number]]                                  Final result               Lavender Top[[id number]]                                     Final result               Gold Top - SST[[id number]]                                   Final result                 Please view results for these tests on the individual orders.   RAINBOW DRAW    Narrative:     The following orders were created for panel order [place] Draw.  Procedure                               Abnormality         Status                     ---------                               -----------         ------                     Light Blue Top[[id number]]                                                              Green Top (Gel)[[id number]]                                  Final result               Lavender Top[[id number]]                                                                Gold Top - SST[[id number]]                                                                Please view results for these tests on the individual orders.   URINALYSIS W/ CULTURE IF INDICATED   CBC AND  DIFFERENTIAL    Narrative:     The following orders were created for panel order CBC & Differential.  Procedure                               Abnormality         Status                     ---------                               -----------         ------                     CBC Auto Differential[[id number]]        Abnormal            Final result                 Please view results for these tests on the individual orders.   LIGHT BLUE TOP   GREEN TOP   LAVENDER TOP   GOLD TOP - SST   GREEN TOP   LIGHT BLUE TOP   LAVENDER TOP   GOLD TOP - SST       XR Chest 1 View   Final Result   Cardiomegaly, no active disease.         Electronically signed by:  Tanner Cervantes MD  4/14/2018 9:54 PM   CDT Workstation: [id number]            Findings discussed with Dr Ferrell, cardiologist and the [facility] and he agrees to accept patient for further evaluation.           MDM  Number of Diagnoses or Management Options  Congestive heart failure, unspecified congestive heart failure chronicity, unspecified congestive heart failure type: established and worsening  Rapid palpitations: established and worsening     Amount and/or Complexity of Data Reviewed  Clinical lab tests: reviewed and ordered  Tests in the radiology section of CPT®: ordered and reviewed  Tests in the medicine section of CPT®: ordered and reviewed  Decide to obtain previous medical records or to obtain history from someone other than the patient: yes  Obtain history from someone other than the patient: yes  Review and summarize past medical records: yes  Discuss the patient with other providers: yes  Independent visualization of images, tracings, or specimens: yes    Risk of Complications, Morbidity, and/or Mortality  Presenting problems: moderate  Diagnostic procedures: moderate  Management options: moderate    Critical Care  Total time providing critical care: 30-74 minutes    Patient Progress  Patient progress: stable      Final diagnoses:   Rapid  palpitations   Congestive heart failure, unspecified congestive heart failure chronicity, unspecified congestive heart failure type            Timo Wu MD  04/15/18 4631

## 2019-06-24 ENCOUNTER — LAB REQUISITION (OUTPATIENT)
Dept: LAB | Facility: HOSPITAL | Age: 64
End: 2019-06-24

## 2019-06-24 DIAGNOSIS — Z00.00 ROUTINE GENERAL MEDICAL EXAMINATION AT A HEALTH CARE FACILITY: ICD-10-CM

## 2019-06-24 LAB
ALBUMIN SERPL-MCNC: 3.2 G/DL (ref 3.5–5.2)
ALBUMIN/GLOB SERPL: 0.9 G/DL
ALP SERPL-CCNC: 90 U/L (ref 39–117)
ALT SERPL W P-5'-P-CCNC: 69 U/L (ref 1–41)
ANION GAP SERPL CALCULATED.3IONS-SCNC: 17 MMOL/L
AST SERPL-CCNC: 43 U/L (ref 1–40)
BASOPHILS # BLD AUTO: 0.04 10*3/MM3 (ref 0–0.2)
BASOPHILS NFR BLD AUTO: 0.4 % (ref 0–1.5)
BILIRUB SERPL-MCNC: 1 MG/DL (ref 0.2–1.2)
BUN BLD-MCNC: 17 MG/DL (ref 8–23)
BUN/CREAT SERPL: 15.3 (ref 7–25)
CALCIUM SPEC-SCNC: 8.4 MG/DL (ref 8.6–10.5)
CHLORIDE SERPL-SCNC: 100 MMOL/L (ref 98–107)
CO2 SERPL-SCNC: 20 MMOL/L (ref 22–29)
CREAT BLD-MCNC: 1.11 MG/DL (ref 0.76–1.27)
DEPRECATED RDW RBC AUTO: 40 FL (ref 37–54)
EOSINOPHIL # BLD AUTO: 0.13 10*3/MM3 (ref 0–0.4)
EOSINOPHIL NFR BLD AUTO: 1.2 % (ref 0.3–6.2)
ERYTHROCYTE [DISTWIDTH] IN BLOOD BY AUTOMATED COUNT: 12.2 % (ref 12.3–15.4)
GFR SERPL CREATININE-BSD FRML MDRD: 67 ML/MIN/1.73
GLOBULIN UR ELPH-MCNC: 3.4 GM/DL
GLUCOSE BLD-MCNC: 219 MG/DL (ref 65–99)
HCT VFR BLD AUTO: 40 % (ref 37.5–51)
HGB BLD-MCNC: 13.2 G/DL (ref 13–17.7)
IMM GRANULOCYTES # BLD AUTO: 0.08 10*3/MM3 (ref 0–0.05)
IMM GRANULOCYTES NFR BLD AUTO: 0.7 % (ref 0–0.5)
LYMPHOCYTES # BLD AUTO: 1.24 10*3/MM3 (ref 0.7–3.1)
LYMPHOCYTES NFR BLD AUTO: 11.6 % (ref 19.6–45.3)
MCH RBC QN AUTO: 29.3 PG (ref 26.6–33)
MCHC RBC AUTO-ENTMCNC: 33 G/DL (ref 31.5–35.7)
MCV RBC AUTO: 88.9 FL (ref 79–97)
MONOCYTES # BLD AUTO: 0.79 10*3/MM3 (ref 0.1–0.9)
MONOCYTES NFR BLD AUTO: 7.4 % (ref 5–12)
NEUTROPHILS # BLD AUTO: 8.42 10*3/MM3 (ref 1.7–7)
NEUTROPHILS NFR BLD AUTO: 78.7 % (ref 42.7–76)
NRBC BLD AUTO-RTO: 0 /100 WBC (ref 0–0.2)
PLATELET # BLD AUTO: 293 10*3/MM3 (ref 140–450)
PMV BLD AUTO: 11.4 FL (ref 6–12)
POTASSIUM BLD-SCNC: 4.6 MMOL/L (ref 3.5–5.2)
PROT SERPL-MCNC: 6.6 G/DL (ref 6–8.5)
RBC # BLD AUTO: 4.5 10*6/MM3 (ref 4.14–5.8)
SODIUM BLD-SCNC: 137 MMOL/L (ref 136–145)
WBC NRBC COR # BLD: 10.7 10*3/MM3 (ref 3.4–10.8)

## 2019-06-24 PROCEDURE — 80053 COMPREHEN METABOLIC PANEL: CPT

## 2019-06-24 PROCEDURE — 85025 COMPLETE CBC W/AUTO DIFF WBC: CPT

## 2022-03-31 ENCOUNTER — TRANSCRIBE ORDERS (OUTPATIENT)
Dept: LAB | Facility: HOSPITAL | Age: 67
End: 2022-03-31

## 2022-03-31 DIAGNOSIS — Z94.1 TRANSPLANTED HEART: Primary | ICD-10-CM

## 2022-04-05 ENCOUNTER — DOCUMENTATION (OUTPATIENT)
Dept: CARDIAC REHAB | Facility: HOSPITAL | Age: 67
End: 2022-04-05

## 2022-04-05 ENCOUNTER — LAB (OUTPATIENT)
Dept: LAB | Facility: HOSPITAL | Age: 67
End: 2022-04-05

## 2022-04-05 DIAGNOSIS — Z94.1 TRANSPLANTED HEART: ICD-10-CM

## 2022-04-05 PROCEDURE — 80197 ASSAY OF TACROLIMUS: CPT

## 2022-04-05 PROCEDURE — 36415 COLL VENOUS BLD VENIPUNCTURE: CPT

## 2022-04-05 NOTE — PROGRESS NOTES
Cardiac Rehab Evaluation: Brandyn comes by today and looks great. States he is doing really well. Scheduled 04/13/22  1300

## 2022-04-06 ENCOUNTER — TRANSCRIBE ORDERS (OUTPATIENT)
Dept: CARDIOLOGY | Facility: CLINIC | Age: 67
End: 2022-04-06

## 2022-04-06 DIAGNOSIS — Z94.1 HEART TRANSPLANT, ORTHOTOPIC, STATUS: Primary | ICD-10-CM

## 2022-04-07 ENCOUNTER — DOCUMENTATION (OUTPATIENT)
Dept: ENDOCRINOLOGY | Facility: CLINIC | Age: 67
End: 2022-04-07

## 2022-04-07 ENCOUNTER — OFFICE VISIT (OUTPATIENT)
Dept: ENDOCRINOLOGY | Facility: CLINIC | Age: 67
End: 2022-04-07

## 2022-04-07 VITALS
BODY MASS INDEX: 30.09 KG/M2 | OXYGEN SATURATION: 99 % | WEIGHT: 227 LBS | HEART RATE: 113 BPM | SYSTOLIC BLOOD PRESSURE: 100 MMHG | HEIGHT: 73 IN | DIASTOLIC BLOOD PRESSURE: 60 MMHG

## 2022-04-07 DIAGNOSIS — E11.69 MIXED DIABETIC HYPERLIPIDEMIA ASSOCIATED WITH TYPE 2 DIABETES MELLITUS: ICD-10-CM

## 2022-04-07 DIAGNOSIS — Z79.4 TYPE 2 DIABETES MELLITUS WITH HYPERGLYCEMIA, WITH LONG-TERM CURRENT USE OF INSULIN: Primary | ICD-10-CM

## 2022-04-07 DIAGNOSIS — E78.2 MIXED DIABETIC HYPERLIPIDEMIA ASSOCIATED WITH TYPE 2 DIABETES MELLITUS: ICD-10-CM

## 2022-04-07 DIAGNOSIS — E11.65 TYPE 2 DIABETES MELLITUS WITH HYPERGLYCEMIA, WITH LONG-TERM CURRENT USE OF INSULIN: Primary | ICD-10-CM

## 2022-04-07 PROCEDURE — 99204 OFFICE O/P NEW MOD 45 MIN: CPT | Performed by: INTERNAL MEDICINE

## 2022-04-07 RX ORDER — MYCOPHENOLATE MOFETIL 250 MG/1
500 CAPSULE ORAL 2 TIMES DAILY
COMMUNITY
End: 2022-06-28

## 2022-04-07 RX ORDER — INSULIN GLARGINE 100 [IU]/ML
20 INJECTION, SOLUTION SUBCUTANEOUS NIGHTLY
COMMUNITY
End: 2022-06-28

## 2022-04-07 RX ORDER — PREDNISONE 1 MG/1
5 TABLET ORAL DAILY
COMMUNITY
End: 2022-06-28

## 2022-04-07 RX ORDER — MYCOPHENOLATE MOFETIL 500 MG/1
1000 TABLET ORAL 2 TIMES DAILY
COMMUNITY
End: 2022-06-28

## 2022-04-07 RX ORDER — TACROLIMUS 1 MG/1
1 CAPSULE ORAL 2 TIMES DAILY
COMMUNITY
End: 2022-06-28

## 2022-04-07 RX ORDER — OMEPRAZOLE 20 MG/1
20 CAPSULE, DELAYED RELEASE ORAL DAILY
COMMUNITY

## 2022-04-07 RX ORDER — SEMAGLUTIDE 1.34 MG/ML
0.5 INJECTION, SOLUTION SUBCUTANEOUS WEEKLY
Qty: 1 PEN | Refills: 11 | Status: SHIPPED | OUTPATIENT
Start: 2022-04-07 | End: 2022-06-28

## 2022-04-07 RX ORDER — ICOSAPENT ETHYL 1000 MG/1
2 CAPSULE ORAL 2 TIMES DAILY WITH MEALS
COMMUNITY
End: 2022-06-28

## 2022-04-07 RX ORDER — ROSUVASTATIN CALCIUM 10 MG/1
10 TABLET, COATED ORAL DAILY
COMMUNITY

## 2022-04-07 RX ORDER — VALGANCICLOVIR 450 MG/1
450 TABLET, FILM COATED ORAL DAILY
COMMUNITY

## 2022-04-07 RX ORDER — SULFAMETHOXAZOLE AND TRIMETHOPRIM 800; 160 MG/1; MG/1
1 TABLET ORAL 2 TIMES DAILY
COMMUNITY
End: 2022-06-28

## 2022-04-07 NOTE — PROGRESS NOTES
"Chief Complaint   Patient presents with   • Diabetes       History of Present Illness    66 y.o. male     Diabetes Type 2    Duration - since age 56 y o  Complications -    CAD , sp Heart Transplant 2021        Present Monitoring -      Fingersticks - 6 x daily      CGM -     Present Regimen -  Before Transplant Januvia 50 mg daily and Aic 6s   Basal , Bolus Insulin   Carb Intake -   Low Carb   Exercise -   Yes   Symptoms -     None   ==========================================  Physical Exam  /60   Pulse 113   Ht 185.4 cm (73\")   Wt 103 kg (227 lb)   SpO2 99%   BMI 29.95 kg/m²   AOx3  No goiter, no carotid bruit  RRR  CTA  No Edema     ==========================================    Laboratory Workup    Lab Results   Component Value Date    WBC 8.41 11/23/2021    HGB 14.7 11/23/2021    HCT 43.3 11/23/2021    MCV 89 11/23/2021     11/23/2021       Lab Results   Component Value Date    GLUCOSE 219 (H) 06/24/2019    BUN 18 11/23/2021    CREATININE 1.17 11/23/2021    EGFRIFNONA >60 11/23/2021    EGFRIFAFRI >60 11/23/2021    BCR 15 11/23/2021     11/23/2021    K 4.5 11/23/2021    CO2 22 11/23/2021    CALCIUM 8.9 11/23/2021    ALBUMIN 4.1 11/23/2021    AST 18 (L) 11/23/2021    ALT 21 11/23/2021       Lab Results   Component Value Date    EGFRIFNONA >60 11/23/2021         ==========================================      ICD-10-CM ICD-9-CM   1. Type 2 diabetes mellitus with hyperglycemia, with long-term current use of insulin ([facility])  E11.65 250.00    Z79.4 790.29     V58.67   2. Mixed diabetic hyperlipidemia associated with type 2 diabetes mellitus (HCC)  E11.69 250.80    E78.2 272.2       Diabetes Mellitus    --------------------------------------------------------------------------------------------------------------------------------------------    Glycemic Management   Lab Results   Component Value Date    HGBA1C 7.1 (H) 10/18/2021           Ozempic    0.25 mg weekly x 4 weeks then 0.5 mg weekly "     If nausea, you can take zofran 4 mg every 6 hours     If side effects then consider doing only 10 clicks       ==========    Lantus    The sugar shouldn't drop when not eating    Ideally between night and morning the glucose should remain within 30 points of each other    If dropping more than 30 points, decrease by 2 more units    ================    Novolog     You are taking 10 units w meals     Understand that it doesn't have to be 10     You can do carb counting, and in that context you may choose to do 1 unit per 5 grams    If you eat 50 grams / 5 = 10 units    As trulicity gets in your system and you start tapering the steroids you will no longer need 1 unit per 5 grams    You may need 1 unit per 10 grams or 1unit per 15 grams    The goal is to not let the sugar rise more than 180    Criteria for Approval of CGM and/or Insulin Pump     The patient has diabetes mellitus, insulin-dependent.    Our Diabetes Department has evaluated the patient in the last six months and will continue counseling on insulin adjustment.     The patient performs blood glucose testing at least four times daily with proven glucose variability from 50 to 300 mg per dl.    The patient is administering basal insulin and prandial insulin four times per day for more than six months.    The patient uses a home blood glucose monitor to assess blood glucose at least four times daily for more than six months.    The patient requires frequent adjustment of insulin treatment regimen based on blood glucose readings.    The patient has frequent variability in blood glucose readings due to activity and variability in meal content and time.     The patient has completed a diabetes education program with us.    The patient has demonstrated the ability to self-monitor her glucose.     The patient is motivated in improving diabetes control       ==========================================================================  Microvascular Complication  "Monitoring    Neuropathy                         Retinopathy     Renal     GFR   Lab Results   Component Value Date    EGFRIFNONA >60 11/23/2021    EGFRIFNONA 55 (L) 10/18/2021    EGFRIFNONA 67 06/24/2019       Albuminuria   Lab Results   Component Value Date    MALBCRERATIO 9 01/08/2016    MALBCRERATIO 6 04/29/2014    MALBCRERATIO Unable to Calculate 03/23/2014         ==========================================================================    Lipids  Lab Results   Component Value Date    CHOL 96 07/03/2017    CHLPL 99 10/18/2021    TRIG 166 (H) 10/18/2021    HDL 30 (L) 10/18/2021    LDL 35.8 10/18/2021        crestor 10 mg qhs   vascepa   ==========================================================================    HTN  /60   Pulse 113   Ht 185.4 cm (73\")   Wt 103 kg (227 lb)   SpO2 99%   BMI 29.95 kg/m²       Controlled   Not on BP meds     ==========================================================================    Bone Health    Vit D    Lab Results   Component Value Date    TNAT42CG 42.3 07/03/2017    OGCM78FW 35.6 05/31/2016      ==========================================================================    Thyroid Assessment  Lab Results   Component Value Date    TSH 3.920 04/14/2018       On amiodarone     ==========================================================================    Vitamin B12  Lab Results   Component Value Date    [id number] 684 05/01/2015         ==========================================================================             No orders of the defined types were placed in this encounter.               This document has been electronically signed by Almas Quezada MD on April 7, 2022 16:46 CDT                      "

## 2022-04-09 LAB — TACROLIMUS BLD LC/MS/MS-MCNC: 8.2 NG/ML (ref 2–20)

## 2022-04-13 ENCOUNTER — HOSPITAL ENCOUNTER (OUTPATIENT)
Dept: CARDIAC REHAB | Facility: HOSPITAL | Age: 67
Setting detail: THERAPIES SERIES
Discharge: HOME | End: 2022-04-13

## 2022-04-13 VITALS
BODY MASS INDEX: 30.61 KG/M2 | SYSTOLIC BLOOD PRESSURE: 122 MMHG | DIASTOLIC BLOOD PRESSURE: 67 MMHG | HEART RATE: 109 BPM | HEIGHT: 72 IN | WEIGHT: 226 LBS

## 2022-04-13 DIAGNOSIS — Z94.1 STATUS POST HEART TRANSPLANTATION: Primary | ICD-10-CM

## 2022-04-13 PROCEDURE — 93798 PHYS/QHP OP CAR RHAB W/ECG: CPT

## 2022-04-13 RX ORDER — BUMETANIDE 1 MG/1
1 TABLET ORAL DAILY PRN
COMMUNITY

## 2022-04-13 NOTE — PROGRESS NOTES
Cardiac Rehab Initial Assessment      Name: Chasity Xavier  :1955 Allergies:Brilinta [ticagrelor] and Lisinopril   MRN: [id number] 66 y.o. Physician: Provider, No Known   Primary Diagnosis:    Diagnosis Plan   1. Status post heart transplantation (HCC)      Event Date: 22 Specialist: Sharif Mcarthur   Secondary Diagnosis: none Risk Stratification:Moderate Risk Note Author: Mary Avalos RN     Cardiovascular History: Previous MI, Previous PCI, Previous CABS, ICD and History of Heart Failure     EXERCISE AT HOME  yes  Walking increasing as tolerated.   EF: 56-60%      Source: echo          Ambulatory Status:Independent  Ambulatory Fall Risk Assessed on Initial Visit: yes 6 Minute Walk Pre- Cardiac Rehab:  Distance:1188ft      RPE:6  Max. HR: 129       SPO2:97   MET: 3  MPH: 2.3             RPD: 0  Resting BP: 122/67 LA, 130/84 RA    Peak BP: 139/86  Recovery BP: 153/88       NUTRITION  Lipids:no If yes, labs as follows;  Total: No components found for: CHOLESTEROL  HDL:   HDL Cholesterol   Date Value Ref Range Status   10/18/2021 30 (L) >=40 mg/dL Final     Comment:     HDL Cholesterol Reference Ranges (age >17 years):  Female, acceptable   > or = 50 mg/dL  Male, acceptable     > or = 40 mg/dL    Lipids continued:  LDL:  LDL Cholesterol    Date Value Ref Range Status   10/18/2021 35.8 <100 mg/dL Final     Comment:     LDL Cholesterol Reference Range (age >17 years):  Optimal:  <100 mg/dL  Near or above optional: 100 - 129 mg/dL  Borderline high: 130 - 159 mg/dL  High: 160 - 189 mg/dL  Very high: >189 mg/dL      LDL Cholesterol Reference Range (age <18 years):  Desirable:     <110 mg/dL   Borderline:    110 - 129 mg/dL   Undesirable:   >130 mg/dL     Triglyceride: No components found for: TRIGLYCERIDE   Weight Management:                 Weight: 2326  Height: 72                                   BMI: Body mass index is 30.65 kg/m².  Waist Circumference: 40  inches   Alcohol  Use: none Diabetes:Yes,  Monitors BS at home- yes, Frequency: 4 times daily, Random BS: 174  Has freestyle constant monitor    Last HGBA1C with date if applicable:No components found for: A1C         SOCIAL HISTORY  Social History     Socioeconomic History   • Marital status:    Tobacco Use   • Smoking status: Never Smoker   • Smokeless tobacco: Never Used   Substance and Sexual Activity   • Alcohol use: No   • Drug use: No   • Sexual activity: Defer       Educational Level (choose one that applies) high school diploma/GED Learning Barriers:Ready to Learn    Family Support:yes    Living Arrangement: lives with their spouse         Tobacco Adjunct: N/A             PSYCHOSOCIAL  Clinical Depression: no    Stress: no     Assess presence or absence of depression using a valid screening tool: yes      PHYSICAL ASSESSMENT    wnl          Angina: no      Denies any incisional or any other pain today Diagnosed with Hypertension:yes    Heart Sounds: wnl     Lung Sounds: normal air entry, lungs clear to auscultation         Assessment: wnl Orthopedic Problems: none    Are you being hurt, hit, or frightened by anyone at home or in your life? no    Are you being neglected by a caregiver? N/A        Assessment: wnl    Family attends IA: no Time of arrival: 1300  Time of departure: 1405     Patient Goals: increase strength and stamina         4/13/2022  15:04 CDT  Mary Avalos RN

## 2022-04-15 ENCOUNTER — HOSPITAL ENCOUNTER (OUTPATIENT)
Dept: CARDIAC REHAB | Facility: HOSPITAL | Age: 67
Setting detail: THERAPIES SERIES
Discharge: HOME | End: 2022-04-15

## 2022-04-15 VITALS — DIASTOLIC BLOOD PRESSURE: 92 MMHG | HEART RATE: 122 BPM | SYSTOLIC BLOOD PRESSURE: 146 MMHG

## 2022-04-15 DIAGNOSIS — Z94.1 STATUS POST HEART TRANSPLANTATION: Primary | ICD-10-CM

## 2022-04-15 PROCEDURE — 93798 PHYS/QHP OP CAR RHAB W/ECG: CPT

## 2022-04-18 ENCOUNTER — HOSPITAL ENCOUNTER (OUTPATIENT)
Dept: CARDIAC REHAB | Facility: HOSPITAL | Age: 67
Setting detail: THERAPIES SERIES
Discharge: HOME | End: 2022-04-18

## 2022-04-18 VITALS
SYSTOLIC BLOOD PRESSURE: 128 MMHG | BODY MASS INDEX: 30.65 KG/M2 | WEIGHT: 226 LBS | HEART RATE: 114 BPM | DIASTOLIC BLOOD PRESSURE: 67 MMHG

## 2022-04-18 DIAGNOSIS — Z94.1 STATUS POST HEART TRANSPLANTATION: Primary | ICD-10-CM

## 2022-04-18 PROCEDURE — 93798 PHYS/QHP OP CAR RHAB W/ECG: CPT

## 2022-04-20 ENCOUNTER — HOSPITAL ENCOUNTER (OUTPATIENT)
Dept: CARDIAC REHAB | Facility: HOSPITAL | Age: 67
Setting detail: THERAPIES SERIES
Discharge: HOME | End: 2022-04-20

## 2022-04-20 VITALS — SYSTOLIC BLOOD PRESSURE: 140 MMHG | HEART RATE: 79 BPM | DIASTOLIC BLOOD PRESSURE: 74 MMHG

## 2022-04-20 DIAGNOSIS — Z94.1 STATUS POST HEART TRANSPLANTATION: Primary | ICD-10-CM

## 2022-04-20 PROCEDURE — 93798 PHYS/QHP OP CAR RHAB W/ECG: CPT

## 2022-04-20 NOTE — ADDENDUM NOTE
Encounter addended by: Stephani Lombardo, RRT on: 4/20/2022 8:15 AM   Actions taken: Charge Capture section accepted

## 2022-04-22 ENCOUNTER — HOSPITAL ENCOUNTER (OUTPATIENT)
Dept: CARDIAC REHAB | Facility: HOSPITAL | Age: 67
Setting detail: THERAPIES SERIES
Discharge: HOME | End: 2022-04-22

## 2022-04-22 VITALS — HEART RATE: 79 BPM | SYSTOLIC BLOOD PRESSURE: 140 MMHG | DIASTOLIC BLOOD PRESSURE: 94 MMHG

## 2022-04-22 DIAGNOSIS — Z94.1 STATUS POST HEART TRANSPLANTATION: Primary | ICD-10-CM

## 2022-04-22 PROCEDURE — 93798 PHYS/QHP OP CAR RHAB W/ECG: CPT

## 2022-04-25 ENCOUNTER — HOSPITAL ENCOUNTER (OUTPATIENT)
Dept: CARDIAC REHAB | Facility: HOSPITAL | Age: 67
Setting detail: THERAPIES SERIES
Discharge: HOME | End: 2022-04-25

## 2022-04-25 VITALS
DIASTOLIC BLOOD PRESSURE: 86 MMHG | HEART RATE: 113 BPM | SYSTOLIC BLOOD PRESSURE: 146 MMHG | BODY MASS INDEX: 30.65 KG/M2 | WEIGHT: 226 LBS

## 2022-04-25 DIAGNOSIS — Z94.1 STATUS POST HEART TRANSPLANTATION: Primary | ICD-10-CM

## 2022-04-25 PROCEDURE — 93798 PHYS/QHP OP CAR RHAB W/ECG: CPT

## 2022-04-27 ENCOUNTER — HOSPITAL ENCOUNTER (OUTPATIENT)
Dept: CARDIAC REHAB | Facility: HOSPITAL | Age: 67
Setting detail: THERAPIES SERIES
Discharge: HOME | End: 2022-04-27

## 2022-04-27 VITALS — DIASTOLIC BLOOD PRESSURE: 86 MMHG | SYSTOLIC BLOOD PRESSURE: 143 MMHG | HEART RATE: 111 BPM

## 2022-04-27 DIAGNOSIS — Z94.1 STATUS POST HEART TRANSPLANTATION: Primary | ICD-10-CM

## 2022-04-27 PROCEDURE — 93798 PHYS/QHP OP CAR RHAB W/ECG: CPT

## 2022-04-29 ENCOUNTER — HOSPITAL ENCOUNTER (OUTPATIENT)
Dept: CARDIAC REHAB | Facility: HOSPITAL | Age: 67
Setting detail: THERAPIES SERIES
Discharge: HOME | End: 2022-04-29

## 2022-04-29 VITALS — DIASTOLIC BLOOD PRESSURE: 75 MMHG | SYSTOLIC BLOOD PRESSURE: 118 MMHG | HEART RATE: 111 BPM

## 2022-04-29 DIAGNOSIS — Z94.1 STATUS POST HEART TRANSPLANTATION: Primary | ICD-10-CM

## 2022-04-29 PROCEDURE — 93798 PHYS/QHP OP CAR RHAB W/ECG: CPT

## 2022-05-02 ENCOUNTER — HOSPITAL ENCOUNTER (OUTPATIENT)
Dept: CARDIAC REHAB | Facility: HOSPITAL | Age: 67
Setting detail: THERAPIES SERIES
Discharge: HOME | End: 2022-05-02

## 2022-05-02 VITALS
DIASTOLIC BLOOD PRESSURE: 79 MMHG | BODY MASS INDEX: 30.52 KG/M2 | HEART RATE: 109 BPM | SYSTOLIC BLOOD PRESSURE: 111 MMHG | WEIGHT: 225 LBS

## 2022-05-02 DIAGNOSIS — Z94.1 STATUS POST HEART TRANSPLANTATION: Primary | ICD-10-CM

## 2022-05-02 PROCEDURE — 93798 PHYS/QHP OP CAR RHAB W/ECG: CPT

## 2022-05-04 ENCOUNTER — HOSPITAL ENCOUNTER (OUTPATIENT)
Dept: CARDIAC REHAB | Facility: HOSPITAL | Age: 67
Setting detail: THERAPIES SERIES
Discharge: HOME | End: 2022-05-04

## 2022-05-04 VITALS — SYSTOLIC BLOOD PRESSURE: 127 MMHG | DIASTOLIC BLOOD PRESSURE: 80 MMHG | HEART RATE: 111 BPM

## 2022-05-04 DIAGNOSIS — Z94.1 STATUS POST HEART TRANSPLANTATION: Primary | ICD-10-CM

## 2022-05-04 PROCEDURE — 93798 PHYS/QHP OP CAR RHAB W/ECG: CPT

## 2022-05-04 NOTE — PROGRESS NOTES
SEE CARDIAC REHAB SESSION MONITOR NOTES IN MEDIA.  
Oriented - self; Oriented - place; Oriented - time

## 2022-05-06 ENCOUNTER — HOSPITAL ENCOUNTER (OUTPATIENT)
Dept: CARDIAC REHAB | Facility: HOSPITAL | Age: 67
Setting detail: THERAPIES SERIES
Discharge: HOME | End: 2022-05-06

## 2022-05-06 VITALS — HEART RATE: 112 BPM | DIASTOLIC BLOOD PRESSURE: 66 MMHG | SYSTOLIC BLOOD PRESSURE: 110 MMHG

## 2022-05-06 DIAGNOSIS — Z94.1 STATUS POST HEART TRANSPLANTATION: Primary | ICD-10-CM

## 2022-05-06 PROCEDURE — 93798 PHYS/QHP OP CAR RHAB W/ECG: CPT

## 2022-05-09 ENCOUNTER — HOSPITAL ENCOUNTER (OUTPATIENT)
Dept: CARDIAC REHAB | Facility: HOSPITAL | Age: 67
Setting detail: THERAPIES SERIES
Discharge: HOME | End: 2022-05-09

## 2022-05-09 VITALS
WEIGHT: 226 LBS | HEART RATE: 112 BPM | BODY MASS INDEX: 30.65 KG/M2 | DIASTOLIC BLOOD PRESSURE: 82 MMHG | SYSTOLIC BLOOD PRESSURE: 128 MMHG

## 2022-05-09 DIAGNOSIS — Z94.1 STATUS POST HEART TRANSPLANTATION: Primary | ICD-10-CM

## 2022-05-09 PROCEDURE — 93798 PHYS/QHP OP CAR RHAB W/ECG: CPT

## 2022-05-11 ENCOUNTER — APPOINTMENT (OUTPATIENT)
Dept: CARDIAC REHAB | Facility: HOSPITAL | Age: 67
End: 2022-05-11

## 2022-05-13 ENCOUNTER — HOSPITAL ENCOUNTER (OUTPATIENT)
Dept: CARDIAC REHAB | Facility: HOSPITAL | Age: 67
Setting detail: THERAPIES SERIES
Discharge: HOME | End: 2022-05-13

## 2022-05-13 VITALS — SYSTOLIC BLOOD PRESSURE: 121 MMHG | HEART RATE: 109 BPM | DIASTOLIC BLOOD PRESSURE: 80 MMHG

## 2022-05-13 DIAGNOSIS — Z94.1 STATUS POST HEART TRANSPLANTATION: Primary | ICD-10-CM

## 2022-05-13 PROCEDURE — 93798 PHYS/QHP OP CAR RHAB W/ECG: CPT

## 2022-05-16 ENCOUNTER — HOSPITAL ENCOUNTER (OUTPATIENT)
Dept: CARDIAC REHAB | Facility: HOSPITAL | Age: 67
Setting detail: THERAPIES SERIES
Discharge: HOME | End: 2022-05-16

## 2022-05-16 VITALS
DIASTOLIC BLOOD PRESSURE: 66 MMHG | WEIGHT: 225 LBS | BODY MASS INDEX: 30.52 KG/M2 | HEART RATE: 113 BPM | SYSTOLIC BLOOD PRESSURE: 110 MMHG

## 2022-05-16 DIAGNOSIS — Z94.1 STATUS POST HEART TRANSPLANTATION: Primary | ICD-10-CM

## 2022-05-16 PROCEDURE — 93798 PHYS/QHP OP CAR RHAB W/ECG: CPT

## 2022-05-16 NOTE — ADDENDUM NOTE
Encounter addended by: Mary Avalos RN on: 5/16/2022 8:17 AM   Actions taken: Charge Capture section accepted

## 2022-05-16 NOTE — ADDENDUM NOTE
Encounter addended by: Mary Avalos RN on: 5/16/2022 8:11 AM   Actions taken: Charge Capture section accepted

## 2022-05-18 ENCOUNTER — HOSPITAL ENCOUNTER (OUTPATIENT)
Dept: CARDIAC REHAB | Facility: HOSPITAL | Age: 67
Setting detail: THERAPIES SERIES
Discharge: HOME | End: 2022-05-18

## 2022-05-18 VITALS — HEART RATE: 114 BPM | SYSTOLIC BLOOD PRESSURE: 118 MMHG | DIASTOLIC BLOOD PRESSURE: 74 MMHG

## 2022-05-18 DIAGNOSIS — Z94.1 STATUS POST HEART TRANSPLANTATION: Primary | ICD-10-CM

## 2022-05-18 PROCEDURE — 93798 PHYS/QHP OP CAR RHAB W/ECG: CPT

## 2022-05-20 ENCOUNTER — HOSPITAL ENCOUNTER (OUTPATIENT)
Dept: CARDIAC REHAB | Facility: HOSPITAL | Age: 67
Setting detail: THERAPIES SERIES
Discharge: HOME | End: 2022-05-20

## 2022-05-20 VITALS — DIASTOLIC BLOOD PRESSURE: 75 MMHG | SYSTOLIC BLOOD PRESSURE: 126 MMHG | HEART RATE: 113 BPM

## 2022-05-20 DIAGNOSIS — Z94.1 STATUS POST HEART TRANSPLANTATION: Primary | ICD-10-CM

## 2022-05-20 PROCEDURE — 93798 PHYS/QHP OP CAR RHAB W/ECG: CPT

## 2022-05-23 ENCOUNTER — HOSPITAL ENCOUNTER (OUTPATIENT)
Dept: CARDIAC REHAB | Facility: HOSPITAL | Age: 67
Setting detail: THERAPIES SERIES
Discharge: HOME | End: 2022-05-23

## 2022-05-23 VITALS
SYSTOLIC BLOOD PRESSURE: 101 MMHG | WEIGHT: 225 LBS | DIASTOLIC BLOOD PRESSURE: 69 MMHG | BODY MASS INDEX: 30.52 KG/M2 | HEART RATE: 112 BPM

## 2022-05-23 DIAGNOSIS — Z94.1 STATUS POST HEART TRANSPLANTATION: Primary | ICD-10-CM

## 2022-05-23 PROCEDURE — 93798 PHYS/QHP OP CAR RHAB W/ECG: CPT

## 2022-05-25 ENCOUNTER — HOSPITAL ENCOUNTER (OUTPATIENT)
Dept: CARDIAC REHAB | Facility: HOSPITAL | Age: 67
Setting detail: THERAPIES SERIES
Discharge: HOME | End: 2022-05-25

## 2022-05-25 VITALS — DIASTOLIC BLOOD PRESSURE: 67 MMHG | HEART RATE: 112 BPM | SYSTOLIC BLOOD PRESSURE: 119 MMHG

## 2022-05-25 DIAGNOSIS — Z94.1 STATUS POST HEART TRANSPLANTATION: Primary | ICD-10-CM

## 2022-05-25 PROCEDURE — 93798 PHYS/QHP OP CAR RHAB W/ECG: CPT

## 2022-05-27 ENCOUNTER — HOSPITAL ENCOUNTER (OUTPATIENT)
Dept: CARDIAC REHAB | Facility: HOSPITAL | Age: 67
Setting detail: THERAPIES SERIES
Discharge: HOME | End: 2022-05-27

## 2022-05-27 VITALS — SYSTOLIC BLOOD PRESSURE: 106 MMHG | DIASTOLIC BLOOD PRESSURE: 64 MMHG | HEART RATE: 122 BPM

## 2022-05-27 DIAGNOSIS — Z94.1 STATUS POST HEART TRANSPLANTATION: Primary | ICD-10-CM

## 2022-05-27 PROCEDURE — 93798 PHYS/QHP OP CAR RHAB W/ECG: CPT

## 2022-06-01 ENCOUNTER — HOSPITAL ENCOUNTER (OUTPATIENT)
Dept: CARDIAC REHAB | Facility: HOSPITAL | Age: 67
Setting detail: THERAPIES SERIES
Discharge: HOME | End: 2022-06-01

## 2022-06-01 VITALS
SYSTOLIC BLOOD PRESSURE: 122 MMHG | BODY MASS INDEX: 30.65 KG/M2 | WEIGHT: 226 LBS | HEART RATE: 111 BPM | DIASTOLIC BLOOD PRESSURE: 72 MMHG

## 2022-06-01 DIAGNOSIS — Z94.1 STATUS POST HEART TRANSPLANTATION: Primary | ICD-10-CM

## 2022-06-01 PROCEDURE — 93798 PHYS/QHP OP CAR RHAB W/ECG: CPT

## 2022-06-03 ENCOUNTER — HOSPITAL ENCOUNTER (OUTPATIENT)
Dept: CARDIAC REHAB | Facility: HOSPITAL | Age: 67
Setting detail: THERAPIES SERIES
Discharge: HOME | End: 2022-06-03

## 2022-06-03 VITALS — HEART RATE: 114 BPM | SYSTOLIC BLOOD PRESSURE: 132 MMHG | DIASTOLIC BLOOD PRESSURE: 82 MMHG

## 2022-06-03 DIAGNOSIS — Z94.1 STATUS POST HEART TRANSPLANTATION: Primary | ICD-10-CM

## 2022-06-03 PROCEDURE — 93798 PHYS/QHP OP CAR RHAB W/ECG: CPT

## 2022-06-06 ENCOUNTER — HOSPITAL ENCOUNTER (OUTPATIENT)
Dept: CARDIAC REHAB | Facility: HOSPITAL | Age: 67
Setting detail: THERAPIES SERIES
Discharge: HOME | End: 2022-06-06

## 2022-06-06 VITALS
WEIGHT: 226 LBS | BODY MASS INDEX: 30.65 KG/M2 | HEART RATE: 107 BPM | SYSTOLIC BLOOD PRESSURE: 122 MMHG | DIASTOLIC BLOOD PRESSURE: 76 MMHG

## 2022-06-06 DIAGNOSIS — Z94.1 STATUS POST HEART TRANSPLANTATION: Primary | ICD-10-CM

## 2022-06-06 PROCEDURE — 93798 PHYS/QHP OP CAR RHAB W/ECG: CPT

## 2022-06-08 ENCOUNTER — APPOINTMENT (OUTPATIENT)
Dept: CARDIAC REHAB | Facility: HOSPITAL | Age: 67
End: 2022-06-08

## 2022-06-10 ENCOUNTER — APPOINTMENT (OUTPATIENT)
Dept: CARDIAC REHAB | Facility: HOSPITAL | Age: 67
End: 2022-06-10

## 2022-06-13 ENCOUNTER — APPOINTMENT (OUTPATIENT)
Dept: CARDIAC REHAB | Facility: HOSPITAL | Age: 67
End: 2022-06-13

## 2022-06-15 ENCOUNTER — APPOINTMENT (OUTPATIENT)
Dept: CARDIAC REHAB | Facility: HOSPITAL | Age: 67
End: 2022-06-15

## 2022-06-17 ENCOUNTER — APPOINTMENT (OUTPATIENT)
Dept: CARDIAC REHAB | Facility: HOSPITAL | Age: 67
End: 2022-06-17

## 2022-06-20 ENCOUNTER — APPOINTMENT (OUTPATIENT)
Dept: CARDIAC REHAB | Facility: HOSPITAL | Age: 67
End: 2022-06-20

## 2022-06-22 ENCOUNTER — APPOINTMENT (OUTPATIENT)
Dept: CARDIAC REHAB | Facility: HOSPITAL | Age: 67
End: 2022-06-22

## 2022-06-24 ENCOUNTER — APPOINTMENT (OUTPATIENT)
Dept: CARDIAC REHAB | Facility: HOSPITAL | Age: 67
End: 2022-06-24

## 2022-06-27 ENCOUNTER — APPOINTMENT (OUTPATIENT)
Dept: CARDIAC REHAB | Facility: HOSPITAL | Age: 67
End: 2022-06-27

## 2022-06-28 ENCOUNTER — OFFICE VISIT (OUTPATIENT)
Dept: FAMILY MEDICINE CLINIC | Facility: CLINIC | Age: 67
End: 2022-06-28

## 2022-06-28 VITALS
HEART RATE: 110 BPM | OXYGEN SATURATION: 97 % | BODY MASS INDEX: 30.34 KG/M2 | WEIGHT: 224 LBS | HEIGHT: 72 IN | DIASTOLIC BLOOD PRESSURE: 80 MMHG | SYSTOLIC BLOOD PRESSURE: 120 MMHG

## 2022-06-28 DIAGNOSIS — E78.2 MIXED HYPERLIPIDEMIA: ICD-10-CM

## 2022-06-28 DIAGNOSIS — Z76.89 ENCOUNTER TO ESTABLISH CARE: ICD-10-CM

## 2022-06-28 DIAGNOSIS — E11.9 DIABETES MELLITUS WITHOUT COMPLICATION: ICD-10-CM

## 2022-06-28 DIAGNOSIS — Z94.1 STATUS POST HEART TRANSPLANT: Primary | ICD-10-CM

## 2022-06-28 DIAGNOSIS — I10 ESSENTIAL HYPERTENSION: ICD-10-CM

## 2022-06-28 DIAGNOSIS — E55.9 VITAMIN D DEFICIENCY: ICD-10-CM

## 2022-06-28 PROCEDURE — 99203 OFFICE O/P NEW LOW 30 MIN: CPT | Performed by: FAMILY MEDICINE

## 2022-06-28 RX ORDER — ALENDRONATE SODIUM 70 MG/1
70 TABLET ORAL
COMMUNITY
End: 2022-06-28

## 2022-06-28 RX ORDER — INSULIN GLARGINE 100 [IU]/ML
10 INJECTION, SOLUTION SUBCUTANEOUS NIGHTLY
Status: SHIPPED | COMMUNITY
Start: 2022-06-28 | End: 2022-07-13

## 2022-06-28 RX ORDER — SULFAMETHOXAZOLE AND TRIMETHOPRIM 800; 160 MG/1; MG/1
1 TABLET ORAL DAILY
Status: SHIPPED | COMMUNITY
Start: 2022-06-28

## 2022-06-28 RX ORDER — TACROLIMUS 1 MG/1
1 CAPSULE ORAL 2 TIMES DAILY
Status: SHIPPED | COMMUNITY
Start: 2022-06-28

## 2022-06-28 RX ORDER — MYCOPHENOLATE MOFETIL 250 MG/1
250 CAPSULE ORAL 2 TIMES DAILY
Status: SHIPPED | COMMUNITY
Start: 2022-06-28

## 2022-06-28 RX ORDER — MYCOPHENOLATE MOFETIL 500 MG/1
1000 TABLET ORAL 2 TIMES DAILY
Status: SHIPPED | COMMUNITY
Start: 2022-06-28

## 2022-06-29 ENCOUNTER — APPOINTMENT (OUTPATIENT)
Dept: CARDIAC REHAB | Facility: HOSPITAL | Age: 67
End: 2022-06-29

## 2022-07-01 ENCOUNTER — APPOINTMENT (OUTPATIENT)
Dept: CARDIAC REHAB | Facility: HOSPITAL | Age: 67
End: 2022-07-01

## 2022-07-06 ENCOUNTER — APPOINTMENT (OUTPATIENT)
Dept: CARDIAC REHAB | Facility: HOSPITAL | Age: 67
End: 2022-07-06

## 2022-07-07 ENCOUNTER — TRANSCRIBE ORDERS (OUTPATIENT)
Dept: LAB | Facility: HOSPITAL | Age: 67
End: 2022-07-07

## 2022-07-07 DIAGNOSIS — Z48.21 ENCOUNTER FOR AFTERCARE FOLLOWING HEART TRANSPLANT: Primary | ICD-10-CM

## 2022-07-12 ENCOUNTER — LAB (OUTPATIENT)
Dept: LAB | Facility: HOSPITAL | Age: 67
End: 2022-07-12

## 2022-07-12 DIAGNOSIS — Z48.21 ENCOUNTER FOR AFTERCARE FOLLOWING HEART TRANSPLANT: ICD-10-CM

## 2022-07-12 LAB
ANION GAP SERPL CALCULATED.3IONS-SCNC: 14.7 MMOL/L (ref 5–15)
BUN SERPL-MCNC: 46 MG/DL (ref 8–23)
BUN/CREAT SERPL: 18 (ref 7–25)
CALCIUM SPEC-SCNC: 9.2 MG/DL (ref 8.6–10.5)
CHLORIDE SERPL-SCNC: 111 MMOL/L (ref 98–107)
CO2 SERPL-SCNC: 13.3 MMOL/L (ref 22–29)
CREAT SERPL-MCNC: 2.56 MG/DL (ref 0.76–1.27)
EGFRCR SERPLBLD CKD-EPI 2021: 26.9 ML/MIN/1.73
GLUCOSE SERPL-MCNC: 299 MG/DL (ref 65–99)
POTASSIUM SERPL-SCNC: 5.3 MMOL/L (ref 3.5–5.2)
SODIUM SERPL-SCNC: 139 MMOL/L (ref 136–145)

## 2022-07-12 PROCEDURE — 80197 ASSAY OF TACROLIMUS: CPT

## 2022-07-12 PROCEDURE — 36415 COLL VENOUS BLD VENIPUNCTURE: CPT

## 2022-07-12 PROCEDURE — 80048 BASIC METABOLIC PNL TOTAL CA: CPT

## 2022-07-13 ENCOUNTER — OFFICE VISIT (OUTPATIENT)
Dept: ENDOCRINOLOGY | Facility: CLINIC | Age: 67
End: 2022-07-13

## 2022-07-13 VITALS
DIASTOLIC BLOOD PRESSURE: 78 MMHG | OXYGEN SATURATION: 97 % | BODY MASS INDEX: 29.8 KG/M2 | HEIGHT: 72 IN | WEIGHT: 220 LBS | HEART RATE: 116 BPM | SYSTOLIC BLOOD PRESSURE: 110 MMHG

## 2022-07-13 DIAGNOSIS — E11.69 MIXED DIABETIC HYPERLIPIDEMIA ASSOCIATED WITH TYPE 2 DIABETES MELLITUS: ICD-10-CM

## 2022-07-13 DIAGNOSIS — E78.2 MIXED DIABETIC HYPERLIPIDEMIA ASSOCIATED WITH TYPE 2 DIABETES MELLITUS: ICD-10-CM

## 2022-07-13 DIAGNOSIS — Z79.4 TYPE 2 DIABETES MELLITUS WITH HYPERGLYCEMIA, WITH LONG-TERM CURRENT USE OF INSULIN: Primary | ICD-10-CM

## 2022-07-13 DIAGNOSIS — E11.65 TYPE 2 DIABETES MELLITUS WITH HYPERGLYCEMIA, WITH LONG-TERM CURRENT USE OF INSULIN: Primary | ICD-10-CM

## 2022-07-13 PROCEDURE — 99214 OFFICE O/P EST MOD 30 MIN: CPT | Performed by: INTERNAL MEDICINE

## 2022-07-13 PROCEDURE — 95251 CONT GLUC MNTR ANALYSIS I&R: CPT | Performed by: INTERNAL MEDICINE

## 2022-07-13 RX ORDER — GLUCAGON 3 MG/1
1 POWDER NASAL AS NEEDED
Qty: 2 EACH | Refills: 11 | Status: SHIPPED | OUTPATIENT
Start: 2022-07-13

## 2022-07-13 RX ORDER — INSULIN GLARGINE 300 U/ML
INJECTION, SOLUTION SUBCUTANEOUS
Qty: 4 PEN | Refills: 11 | Status: SHIPPED | OUTPATIENT
Start: 2022-07-13 | End: 2022-07-15

## 2022-07-13 RX ORDER — INSULIN ASPART INJECTION 100 [IU]/ML
INJECTION, SOLUTION SUBCUTANEOUS
Qty: 45 ML | Refills: 3 | Status: SHIPPED | OUTPATIENT
Start: 2022-07-13

## 2022-07-13 RX ORDER — TACROLIMUS 5 MG/1
5 CAPSULE ORAL 2 TIMES DAILY
COMMUNITY

## 2022-07-13 RX ORDER — TACROLIMUS 0.5 MG/1
0.5 CAPSULE ORAL EVERY MORNING
COMMUNITY

## 2022-07-13 RX ORDER — PEN NEEDLE, DIABETIC 30 GX3/16"
1 NEEDLE, DISPOSABLE MISCELLANEOUS 4 TIMES DAILY
Qty: 120 EACH | Refills: 11 | Status: SHIPPED | OUTPATIENT
Start: 2022-07-13 | End: 2022-07-15 | Stop reason: SDUPTHER

## 2022-07-13 NOTE — PROGRESS NOTES
"Chief Complaint   Patient presents with   • Diabetes     T2       History of Present Illness    66 y.o. male     Diabetes Type 2    Duration - since age 56 y o  Complications -    CAD , sp Heart Transplant 2021  Stage IV CKD    No neuropathy    No Retinopathy , last eye exam 2020       Present Monitoring -      Fingersticks - 6 x daily      CGM -     Present Regimen -  Before Transplant Januvia 50 mg daily and Aic 6s   Basal , Bolus Insulin now and hyperglycmic    When he was on steroids he had adapted but when switched to cellcept we thought he could come down on insulin but having \"steroid like effect\"    Carb Intake -   Low Carb   Exercise -   Yes   Symptoms -     None   ==========================================  Physical Exam  /78   Pulse 116   Ht 182.9 cm (72\")   Wt 99.8 kg (220 lb)   SpO2 97%   BMI 29.84 kg/m²   AOx3  No goiter, no carotid bruit  RRR  CTA  No Edema     ==========================================    Laboratory Workup    Lab Results   Component Value Date    WBC 8.41 11/23/2021    HGB 13.2 (L) 02/06/2022    HCT 43.3 11/23/2021    MCV 89 11/23/2021     11/23/2021       Lab Results   Component Value Date    GLUCOSE 299 (H) 07/12/2022    BUN 46 (H) 07/12/2022    CREATININE 2.56 (H) 07/12/2022    EGFRIFNONA >60 11/23/2021    EGFRIFAFRI >60 11/23/2021    BCR 18.0 07/12/2022     07/12/2022    K 5.3 (H) 07/12/2022    CO2 13.3 (L) 07/12/2022    CALCIUM 9.2 07/12/2022    ALBUMIN 4.1 11/23/2021     (H) 02/05/2022    ALT 21 11/23/2021       Lab Results   Component Value Date    EGFRIFNONA >60 11/23/2021         ==========================================      ICD-10-CM ICD-9-CM   1. Type 2 diabetes mellitus with hyperglycemia, with long-term current use of insulin ([facility])  E11.65 250.00    Z79.4 790.29     V58.67   2. Mixed diabetic hyperlipidemia associated with type 2 diabetes mellitus ([facility])  E11.69 250.80    E78.2 272.2       Diabetes " "Mellitus    --------------------------------------------------------------------------------------------------------------------------------------------    Glycemic Management   Lab Results   Component Value Date    HGBA1C 7.5 02/04/2022           Ozempic, can't tolerate even small doses       Sandra 2 weeks report    TIR 3%   No lows    Type 2 Diabetes w hyperglycemia    Lantus 20 units , waking has improved    Novolog , doing 20 units w meals and still high   Increase to 25 and progressively increase , as much as 50     Be consistent w diet     Approve for glucagon               ==========================================================================  Microvascular Complication Monitoring    Neuropathy                         Retinopathy     Renal     GFR   Lab Results   Component Value Date    EGFRIFNONA >60 11/23/2021    EGFRIFNONA 55 (L) 10/18/2021    EGFRIFNONA 67 06/24/2019       Albuminuria   Lab Results   Component Value Date    MALBCRERATIO 9 01/08/2016    MALBCRERATIO 6 04/29/2014    MALBCRERATIO Unable to Calculate 03/23/2014         ==========================================================================    Lipids  Lab Results   Component Value Date    CHOL 96 07/03/2017    CHLPL 110 04/11/2022    TRIG 234 (H) 04/11/2022    HDL 25 (L) 04/11/2022    LDL 38 04/11/2022        crestor 10 mg qhs   vascepa   ==========================================================================    HTN  /78   Pulse 116   Ht 182.9 cm (72\")   Wt 99.8 kg (220 lb)   SpO2 97%   BMI 29.84 kg/m²       Controlled   Not on BP meds     ==========================================================================    Bone Health    Vit D    Lab Results   Component Value Date    FETY37OA 42.3 07/03/2017    JUPX48UU 35.6 05/31/2016      ==========================================================================    Thyroid Assessment  Lab Results   Component Value Date    TSH 3.920 04/14/2018       On amiodarone "     ==========================================================================    Vitamin B12  Lab Results   Component Value Date    [id number] 684 05/01/2015         ==========================================================================             No orders of the defined types were placed in this encounter.               This document has been electronically signed by Almas Quezada MD on July 13, 2022 16:11 CDT

## 2022-07-15 ENCOUNTER — SPECIALTY PHARMACY (OUTPATIENT)
Dept: ENDOCRINOLOGY | Facility: CLINIC | Age: 67
End: 2022-07-15

## 2022-07-15 LAB
CMV DNA SERPL NAA+PROBE-ACNC: NEGATIVE IU/ML
CMV DNA SERPL NAA+PROBE-LOG IU: NORMAL {LOG_IU}/ML
TACROLIMUS BLD LC/MS/MS-MCNC: 8.9 NG/ML (ref 2–20)

## 2022-07-15 RX ORDER — PEN NEEDLE, DIABETIC 30 GX3/16"
1 NEEDLE, DISPOSABLE MISCELLANEOUS 4 TIMES DAILY
Qty: 120 EACH | Refills: 11 | Status: SHIPPED | OUTPATIENT
Start: 2022-07-15

## 2022-07-15 NOTE — PROGRESS NOTES
Specialty Pharmacy Program - Endocrinology       Chasity Xavier is a 66 y.o. male with Type 2 Diabetes enrolled in the Endocrinology Patient Management program offered by [facility].  An initial outreach was conducted, including assessment of therapy appropriateness and specialty medication education for Tresiba. The patient was introduced to services offered by our specialty pharmacy program, including: regular assessments, refill coordination, curbside pick-up or mail order delivery options, prior authorization maintenance, and financial assistance programs as applicable. The patient was also provided with contact information for the pharmacy team.     Insurance Coverage & Financial Support  medicare    Relevant Past Medical History and Comorbidities  Relevant medical history and concomitant health conditions were discussed with the patient. The patient's chart has been reviewed for relevant past medical history and comorbid health conditions and updated as necessary.   Past Medical History:   Diagnosis Date   • Chronic kidney disease     pt has one kidney from birth   • Coronary atherosclerosis    • Hyperlipidemia    • Ischemic cardiomyopathy    • Obesity    • DULCE MARIA (obstructive sleep apnea)    • Osteoarthritis    • Solitary kidney, congenital    • Type 2 diabetes mellitus (HCC)      Social History     Socioeconomic History   • Marital status:    Tobacco Use   • Smoking status: Never Smoker   • Smokeless tobacco: Never Used   Substance and Sexual Activity   • Alcohol use: No   • Drug use: No   • Sexual activity: Defer       Problem list reviewed by Ammon Méndez, PharmD on 7/15/2022 at 11:46 AM    Allergies  Known allergies and reactions were discussed with the patient. The patient's chart has been reviewed for  allergy information and updated as necessary.   Brilinta [ticagrelor] and Lisinopril    Allergies reviewed by Ammon Méndez, PharmD on 7/15/2022 at 11:46  AM    Current Medication List  This medication list has been reviewed with the patient and evaluated for any interactions or necessary modifications/recommendations, and updated to include all prescription medications, OTC medications, and supplements the patient is currently taking.  This list reflects what is contained in the patient's profile, which has also been marked as reviewed to communicate to other providers it is the most up to date version of the patient's current medication therapy.     Current Outpatient Medications:   •  aspirin 81 MG EC tablet, Take 1 tablet by mouth daily., Disp: , Rfl:   •  bumetanide (BUMEX) 1 MG tablet, Take 1 mg by mouth Daily As Needed (weight gain 3-5lbs in a day)., Disp: , Rfl:   •  Calcium 600-400 MG-UNIT chewable tablet, Chew 2 (Two) Times a Day., Disp: , Rfl:   •  Glucagon (Baqsimi One Pack) 3 MG/DOSE powder, 1 each into the nostril(s) as directed by provider As Needed (Hypoglycemia). Apply intranasal if hypoglycemia, Disp: 2 each, Rfl: 11  •  glucose blood test strip, Accu-Chek Xin Plus In Vitro Strip; Patient Sig: Accu-Chek Xin Plus In Vitro Strip ; 200; 0; 13-May-2013; Active, Disp: , Rfl:   •  Insulin Aspart, w/Niacinamide, (Fiasp FlexTouch) 100 UNIT/ML solution pen-injector, UP TO 50 UNITS QAC TID. E11.65, Disp: 15 pen, Rfl: 3  •  Insulin Glargine, 2 Unit Dial, (Toujeo Max SoloStar) 300 UNIT/ML solution pen-injector injection, 40 units qhs, Disp: 4 pen, Rfl: 11  •  Insulin Pen Needle (Pen Needles) 32G X 4 MM misc, 1 each 4 (Four) Times a Day. Use 4 x daily, Dx code E11.65, Disp: 120 each, Rfl: 11  •  magnesium oxide (MAGOX) 400 (241.3 MG) MG tablet tablet, Take 400 mg by mouth 2 (Two) Times a Day., Disp: , Rfl:   •  Multiple Vitamins-Minerals (MULTIVITAL) tablet, Take 1 tablet by mouth daily., Disp: , Rfl:   •  mycophenolate (CELLCEPT) 250 MG capsule, Take 250 mg by mouth 2 (Two) Times a Day., Disp: , Rfl:   •  mycophenolate (CELLCEPT) 500 MG tablet, Take 1,000 mg  by mouth 2 (Two) Times a Day., Disp: , Rfl:   •  omeprazole (priLOSEC) 20 MG capsule, Take 20 mg by mouth Daily., Disp: , Rfl:   •  rosuvastatin (CRESTOR) 10 MG tablet, Take 10 mg by mouth Daily., Disp: , Rfl:   •  sulfamethoxazole-trimethoprim (BACTRIM DS,SEPTRA DS) 800-160 MG per tablet, Take 1 tablet by mouth Daily. Takes Mon,Wed Fri, Disp: , Rfl:   •  tacrolimus (PROGRAF) 0.5 MG capsule, Take 0.5 mg by mouth Every Morning., Disp: , Rfl:   •  tacrolimus (PROGRAF) 1 MG capsule, Take 1 mg by mouth 2 (Two) Times a Day. Takes 3 am twice day, Disp: , Rfl:   •  tacrolimus (Prograf) 5 MG capsule, Take 5 mg by mouth 2 (Two) Times a Day., Disp: , Rfl:   •  valGANciclovir (VALCYTE) 450 MG tablet, Take 450 mg by mouth Daily. Takes one on Mon, Wed, Fri, Disp: , Rfl:          Drug Interactions  none     Relevant Laboratory Values  A1C Last 3 Results    HGBA1C Last 3 Results 10/18/21 1/4/22 2/4/22   Hemoglobin A1C 7.1 (A) 7.3 7.5   (A) Abnormal value       Comments are available for some flowsheets but are not being displayed.           Lab Results   Component Value Date    HGBA1C 7.5 02/04/2022     Lab Results   Component Value Date    GLUCOSE 299 (H) 07/12/2022    CALCIUM 9.2 07/12/2022     07/12/2022    K 5.3 (H) 07/12/2022    CO2 13.3 (L) 07/12/2022     (H) 07/12/2022    BUN 46 (H) 07/12/2022    CREATININE 2.56 (H) 07/12/2022    EGFRIFAFRI >60 11/23/2021    EGFRIFNONA >60 11/23/2021    BCR 18.0 07/12/2022    ANIONGAP 14.7 07/12/2022     Lab Results   Component Value Date    CHOL 96 07/03/2017    CHLPL 110 04/11/2022    TRIG 234 (H) 04/11/2022    HDL 25 (L) 04/11/2022    LDL 38 04/11/2022         Initial Education Provided for Specialty Medication  The patient has been provided with the following education and any applicable administration techniques (i.e. self-injection) have been demonstrated for the therapies indicated. All questions and concerns have been addressed prior to the patient receiving the  medication, and the patient has verbalized understanding of the education and any materials provided.  Additional patient education shall be provided and documented upon request by the patient, provider or payer.      TRESIBA® (insulin degludec)   Medication Expectations    Why am I taking this medication?  You are taking this medication to lower blood sugar and A1c because you have type 1 or type 2 diabetes. Diabetes is not curable but with proper medication and treatment, we can keep your blood sugar within your personalized target range.    What should I expect while on this medication?  You should expect to see your blood sugar and A1c decrease over time.    How does the medication work?  Tresiba is a long-acting insulin that slowly and steadily releases after administration for 24-hours of blood sugar control. Insulin helps the sugar in your blood get into cells and provide them with energy to fuel your body.     How long will I be on this medication for?  If you have Type 1 diabetes, you will be on this medication or another insulin throughout your lifetime because your body cannot make its own insulin. If you have Type 2 diabetes, the amount of time you will be on this medication will be determined by your doctor based on blood sugar and A1c control. You will most likely be on this medication or another diabetes medication throughout your lifetime because your body does not make enough insulin. Do not abruptly stop this medication without talking to your doctor first.     How do I take this medication?  Take as directed on your prescription label. Tresiba is usually given once daily and can be taken with or without food. Tresiba is injected under the skin (subcutaneously) of your stomach, thigh, or upper arm. Use a different injection site in the same body region each day.       When using the FlexTouch pens, prime the needle before each injection by injecting 2 units of insulin into the air. Once the needle  is inserted under the skin, continue to press the button until the dial has returned to 0 and for an additional 6 seconds. Then remove the needle and discard. Use a new needle for each injection.   What are some possible side effects?  Tresiba may cause low blood sugar (hypoglycemia). Signs and symptoms that may indicate hypoglycemia include dizziness, sweating, anxiety, irritability, confusion, or headache. The most common side effects of Tresiba include reactions or skin thickening at the injection site, headache, weight gain, swelling of your hands and feet, and cough/runny nose/sore throat.    What happens if I miss a dose?  If you miss a dose, take it as soon as your remember. If it is close to your next dose, skip it. Always make sure there are at least 8 hours between doses and never take 2 doses at once.       Medication Safety    What are things I should warn my doctor immediately about?  Talk to your doctor if you are pregnant, planning to become pregnant, or breastfeeding. Also tell your doctor if you notice any signs/symptoms of an allergic reaction (rash, hives, difficulty breathing, etc.).    What are things that I should be cautious of?  Be cautious of any side effects from this medication. Talk to your doctor if any new ones develop or aren't getting better.    What are some medications that can interact with this one?  Do not take this medication with rosiglitazone. Taking Tresiba with other medications that lower your blood sugar such as SGLT-2 inhibitors (Jardiance, Invokana, Farxiga, etc.), GLP-1 agonists (Ozempic, Trulicity, Victoza, etc.), glipizide/glimepiride/glyburide, pioglitazone, and others may increase the risk of low blood sugar. Your doctor may reduce the dose of these medications when you start Tresiba to minimize low blood sugars. Always tell your doctor or pharmacist immediately if you start taking any new medications, including over-the-counter medications, vitamins, and herbal  supplements.        Medication Storage/Handling    How should I handle this medication?  Keep this medication out of reach of pets/children and keep the pen capped when not in use. Do not share your medicine with others.     How does this medication need to be stored?  Store your new, unused pens in the original carton in the refrigerator. Protect it from light. Do not freeze. You may store your opened Tresiba at room temperature or in the refrigerator for up to 56 days (8 weeks). Always remove the needle from the pen before storing.     How should I dispose of this medication?  Used Tresiba pens should be thrown away after 56 days even if insulin remains.?Place your used pen and needle in an approved sharps container. If you do not have a sharps container, you may use a household container made of heavy-duty plastic with a tight-fitting lid that is leak resistant (e.g.,?heavy-duty?plastic laundry detergent bottle).?If your doctor decides to stop this medication, take to your local police station for proper disposal. Some pharmacies also have?take-back bins for medication drop-off.??       Resources/Support    How can I remind myself to take this medication?  You can download reminder apps to help you manage your refills. You may also set an alarm on your phone to remind you.     Is financial support available?   [facility] can provide co-pay cards if you have commercial insurance or patient assistance if you have Medicare or no insurance.     Which vaccines are recommended for me?  Talk to your doctor about these vaccines: Flu, Coronavirus (COVID-19), Pneumococcal (pneumonia), Tdap, Hepatitis B, Zoster (shingles)            Adherence and Self-Administration  • Barriers to Patient Adherence and/or Self-Administration: none   • Methods for Supporting Patient Adherence and/or Self-Administration: none needed     Reassessment Plan & Follow-Up  1. Medication Therapy Changes: switching to a better long acting insulin    2. Additional Plans, Therapy Recommendations, or Therapy Problems to Be Addressed: novolog to fiasp   3. Pharmacist to perform regular reassessments no more than (6) months from the previous assessment.  4. Welcome information and patient satisfaction survey to be sent by retail team with patient's initial fill.  5. Care Coordinator to set up future refill outreaches, coordinate prescription delivery, and escalate clinical questions to pharmacist.     Attestation  I attest that the initiated specialty medication(s) are appropriate for the patient based on my assessment.  If the prescribed therapy is at any point deemed not appropriate based on the current or future assessments, a consultation will be initiated with the patient's specialty care provider to determine the best course of action. The revised plan of therapy will be documented along with any additional patient education provided.     Trevon Méndez, AnjelicaD, MPH, BCPS    7/15/2022  11:47 CDT

## 2022-08-11 ENCOUNTER — TRANSCRIBE ORDERS (OUTPATIENT)
Dept: LAB | Facility: HOSPITAL | Age: 67
End: 2022-08-11

## 2022-08-11 DIAGNOSIS — E78.5 HYPERLIPIDEMIA, UNSPECIFIED HYPERLIPIDEMIA TYPE: ICD-10-CM

## 2022-08-11 DIAGNOSIS — R06.00 DYSPNEA, UNSPECIFIED TYPE: Primary | ICD-10-CM

## 2022-08-11 DIAGNOSIS — D64.9 ANEMIA, UNSPECIFIED TYPE: ICD-10-CM

## 2022-08-11 DIAGNOSIS — Z94.1 HEART REPLACED BY TRANSPLANT: ICD-10-CM

## 2022-08-15 ENCOUNTER — TELEPHONE (OUTPATIENT)
Dept: ENDOCRINOLOGY | Facility: CLINIC | Age: 67
End: 2022-08-15

## 2022-08-17 ENCOUNTER — LAB (OUTPATIENT)
Dept: LAB | Facility: HOSPITAL | Age: 67
End: 2022-08-17

## 2022-08-17 DIAGNOSIS — E78.5 HYPERLIPIDEMIA, UNSPECIFIED HYPERLIPIDEMIA TYPE: ICD-10-CM

## 2022-08-17 DIAGNOSIS — Z94.1 HEART REPLACED BY TRANSPLANT: ICD-10-CM

## 2022-08-17 DIAGNOSIS — R06.00 DYSPNEA, UNSPECIFIED TYPE: ICD-10-CM

## 2022-08-17 DIAGNOSIS — D64.9 ANEMIA, UNSPECIFIED TYPE: ICD-10-CM

## 2022-08-17 LAB
BASOPHILS # BLD AUTO: 0.04 10*3/MM3 (ref 0–0.2)
BASOPHILS NFR BLD AUTO: 1.2 % (ref 0–1.5)
CHOLEST SERPL-MCNC: 113 MG/DL (ref 0–200)
DEPRECATED RDW RBC AUTO: 41.5 FL (ref 37–54)
EOSINOPHIL # BLD AUTO: 0.04 10*3/MM3 (ref 0–0.4)
EOSINOPHIL NFR BLD AUTO: 1.2 % (ref 0.3–6.2)
ERYTHROCYTE [DISTWIDTH] IN BLOOD BY AUTOMATED COUNT: 13.6 % (ref 12.3–15.4)
FERRITIN SERPL-MCNC: 401 NG/ML (ref 30–400)
FOLATE SERPL-MCNC: >20 NG/ML (ref 4.78–24.2)
HCT VFR BLD AUTO: 29.1 % (ref 37.5–51)
HDLC SERPL-MCNC: 26 MG/DL (ref 40–60)
HGB BLD-MCNC: 9.3 G/DL (ref 13–17.7)
IMM GRANULOCYTES # BLD AUTO: 0.05 10*3/MM3 (ref 0–0.05)
IMM GRANULOCYTES NFR BLD AUTO: 1.5 % (ref 0–0.5)
IRON 24H UR-MRATE: 62 MCG/DL (ref 59–158)
IRON SATN MFR SERPL: 22 % (ref 20–50)
LDLC SERPL CALC-MCNC: 51 MG/DL (ref 0–100)
LDLC/HDLC SERPL: 1.62 {RATIO}
LYMPHOCYTES # BLD AUTO: 1.09 10*3/MM3 (ref 0.7–3.1)
LYMPHOCYTES NFR BLD AUTO: 33.1 % (ref 19.6–45.3)
MCH RBC QN AUTO: 27.7 PG (ref 26.6–33)
MCHC RBC AUTO-ENTMCNC: 32 G/DL (ref 31.5–35.7)
MCV RBC AUTO: 86.6 FL (ref 79–97)
MONOCYTES # BLD AUTO: 0.47 10*3/MM3 (ref 0.1–0.9)
MONOCYTES NFR BLD AUTO: 14.3 % (ref 5–12)
NEUTROPHILS NFR BLD AUTO: 1.6 10*3/MM3 (ref 1.7–7)
NEUTROPHILS NFR BLD AUTO: 48.7 % (ref 42.7–76)
NRBC BLD AUTO-RTO: 0 /100 WBC (ref 0–0.2)
NT-PROBNP SERPL-MCNC: 1807 PG/ML (ref 0–900)
PLATELET # BLD AUTO: 235 10*3/MM3 (ref 140–450)
PMV BLD AUTO: 10.9 FL (ref 6–12)
RBC # BLD AUTO: 3.36 10*6/MM3 (ref 4.14–5.8)
TIBC SERPL-MCNC: 279 MCG/DL (ref 298–536)
TRANSFERRIN SERPL-MCNC: 187 MG/DL (ref 200–360)
TRIGL SERPL-MCNC: 224 MG/DL (ref 0–150)
VIT B12 BLD-MCNC: 1628 PG/ML (ref 211–946)
VLDLC SERPL-MCNC: 36 MG/DL (ref 5–40)
WBC NRBC COR # BLD: 3.29 10*3/MM3 (ref 3.4–10.8)

## 2022-08-17 PROCEDURE — 82607 VITAMIN B-12: CPT

## 2022-08-17 PROCEDURE — 80061 LIPID PANEL: CPT

## 2022-08-17 PROCEDURE — 83540 ASSAY OF IRON: CPT

## 2022-08-17 PROCEDURE — 85025 COMPLETE CBC W/AUTO DIFF WBC: CPT

## 2022-08-17 PROCEDURE — 82728 ASSAY OF FERRITIN: CPT

## 2022-08-17 PROCEDURE — 82746 ASSAY OF FOLIC ACID SERUM: CPT

## 2022-08-17 PROCEDURE — 36415 COLL VENOUS BLD VENIPUNCTURE: CPT

## 2022-08-17 PROCEDURE — 84466 ASSAY OF TRANSFERRIN: CPT

## 2022-08-17 PROCEDURE — 83880 ASSAY OF NATRIURETIC PEPTIDE: CPT

## 2022-08-20 LAB
CMV DNA SERPL NAA+PROBE-ACNC: NEGATIVE IU/ML
CMV DNA SERPL NAA+PROBE-LOG IU: NORMAL {LOG_IU}/ML

## 2022-09-08 ENCOUNTER — DOCUMENTATION (OUTPATIENT)
Dept: ENDOCRINOLOGY | Facility: CLINIC | Age: 67
End: 2022-09-08

## 2022-09-13 ENCOUNTER — LAB (OUTPATIENT)
Dept: LAB | Facility: HOSPITAL | Age: 67
End: 2022-09-13

## 2022-09-13 ENCOUNTER — TRANSCRIBE ORDERS (OUTPATIENT)
Dept: LAB | Facility: HOSPITAL | Age: 67
End: 2022-09-13

## 2022-09-13 DIAGNOSIS — Z94.1 HEART REPLACED BY TRANSPLANT: Primary | ICD-10-CM

## 2022-09-13 DIAGNOSIS — Z94.1 HEART REPLACED BY TRANSPLANT: ICD-10-CM

## 2022-09-13 LAB
ANION GAP SERPL CALCULATED.3IONS-SCNC: 13.4 MMOL/L (ref 5–15)
BUN SERPL-MCNC: 41 MG/DL (ref 8–23)
BUN/CREAT SERPL: 18 (ref 7–25)
CALCIUM SPEC-SCNC: 9.5 MG/DL (ref 8.6–10.5)
CHLORIDE SERPL-SCNC: 104 MMOL/L (ref 98–107)
CO2 SERPL-SCNC: 18.6 MMOL/L (ref 22–29)
CREAT SERPL-MCNC: 2.28 MG/DL (ref 0.76–1.27)
EGFRCR SERPLBLD CKD-EPI 2021: 30.9 ML/MIN/1.73
GLUCOSE SERPL-MCNC: 274 MG/DL (ref 65–99)
POTASSIUM SERPL-SCNC: 5 MMOL/L (ref 3.5–5.2)
SODIUM SERPL-SCNC: 136 MMOL/L (ref 136–145)

## 2022-09-13 PROCEDURE — 80048 BASIC METABOLIC PNL TOTAL CA: CPT

## 2022-09-13 PROCEDURE — 36415 COLL VENOUS BLD VENIPUNCTURE: CPT

## 2022-09-13 PROCEDURE — 80197 ASSAY OF TACROLIMUS: CPT

## 2022-09-16 LAB — TACROLIMUS BLD LC/MS/MS-MCNC: 8.7 NG/ML (ref 2–20)

## 2022-09-22 ENCOUNTER — TELEPHONE (OUTPATIENT)
Dept: ENDOCRINOLOGY | Facility: CLINIC | Age: 67
End: 2022-09-22

## 2022-09-22 NOTE — TELEPHONE ENCOUNTER
Patient called wanting to know if we have samples of Lyumjev &Tresiba Flextouch    He also wants to know if we have samples of Sandra 2 sensor.    Thanks

## 2022-09-22 NOTE — TELEPHONE ENCOUNTER
Patient is told we do have Lyumjev but not Tresiba. He is provided with 2 pens of Lyumjev and one pen of Toujeo Max Solostar.

## 2022-10-06 ENCOUNTER — SPECIALTY PHARMACY (OUTPATIENT)
Dept: ENDOCRINOLOGY | Facility: CLINIC | Age: 67
End: 2022-10-06

## 2022-10-06 ENCOUNTER — OFFICE VISIT (OUTPATIENT)
Dept: ENDOCRINOLOGY | Facility: CLINIC | Age: 67
End: 2022-10-06

## 2022-10-06 VITALS
OXYGEN SATURATION: 100 % | HEIGHT: 72 IN | WEIGHT: 231 LBS | DIASTOLIC BLOOD PRESSURE: 90 MMHG | SYSTOLIC BLOOD PRESSURE: 158 MMHG | HEART RATE: 119 BPM | BODY MASS INDEX: 31.29 KG/M2

## 2022-10-06 DIAGNOSIS — E11.65 TYPE 2 DIABETES MELLITUS WITH HYPERGLYCEMIA, WITH LONG-TERM CURRENT USE OF INSULIN: Primary | ICD-10-CM

## 2022-10-06 DIAGNOSIS — E11.69 MIXED DIABETIC HYPERLIPIDEMIA ASSOCIATED WITH TYPE 2 DIABETES MELLITUS: ICD-10-CM

## 2022-10-06 DIAGNOSIS — Z79.4 TYPE 2 DIABETES MELLITUS WITH HYPERGLYCEMIA, WITH LONG-TERM CURRENT USE OF INSULIN: Primary | ICD-10-CM

## 2022-10-06 DIAGNOSIS — E78.2 MIXED DIABETIC HYPERLIPIDEMIA ASSOCIATED WITH TYPE 2 DIABETES MELLITUS: ICD-10-CM

## 2022-10-06 LAB — HBA1C MFR BLD: 7.7 %

## 2022-10-06 PROCEDURE — 99214 OFFICE O/P EST MOD 30 MIN: CPT | Performed by: INTERNAL MEDICINE

## 2022-10-06 PROCEDURE — 95251 CONT GLUC MNTR ANALYSIS I&R: CPT | Performed by: INTERNAL MEDICINE

## 2022-10-06 RX ORDER — FERROUS SULFATE 325(65) MG
325 TABLET ORAL
COMMUNITY

## 2022-10-06 NOTE — PROGRESS NOTES
"Chief Complaint   Patient presents with   • Diabetes       t2       History of Present Illness    66 y.o. male     Diabetes Type 2    Duration - since age 56 y o  Complications -    CAD , sp Heart Transplant 2021  Stage IV CKD    No neuropathy    No Retinopathy , last eye exam 2020       Present Monitoring -      Fingersticks - 6 x daily      CGM -     Present Regimen -  Before Transplant Januvia 50 mg daily and Aic 6s   Basal , Bolus Insulin now and hyperglycmic    When he was on steroids he had adapted but when switched to cellcept we thought he could come down on insulin but having \"steroid like effect\"    Carb Intake -   Low Carb   Exercise -   Yes   Symptoms -     None   ==========================================  Physical Exam  /90   Pulse 119   Ht 182.9 cm (72\")   Wt 105 kg (231 lb)   SpO2 100%   BMI 31.33 kg/m²   AOx3  No goiter, no carotid bruit  RRR  CTA  No Edema     ==========================================    Laboratory Workup    Lab Results   Component Value Date    WBC 3.29 (L) 08/17/2022    HGB 9.3 (L) 08/17/2022    HCT 29.1 (L) 08/17/2022    MCV 86.6 08/17/2022     08/17/2022       Lab Results   Component Value Date    GLUCOSE 274 (H) 09/13/2022    BUN 41 (H) 09/13/2022    CREATININE 2.28 (H) 09/13/2022    EGFRIFNONA >60 11/23/2021    EGFRIFAFRI >60 11/23/2021    BCR 18.0 09/13/2022     09/13/2022    K 5.0 09/13/2022    CO2 18.6 (L) 09/13/2022    CALCIUM 9.5 09/13/2022    ALBUMIN 4.1 11/23/2021     (H) 02/05/2022    ALT 21 11/23/2021       Lab Results   Component Value Date    EGFRIFNONA >60 11/23/2021         ==========================================      ICD-10-CM ICD-9-CM   1. Type 2 diabetes mellitus with hyperglycemia, with long-term current use of insulin ([facility])  E11.65 250.00    Z79.4 790.29     V58.67   2. Mixed diabetic hyperlipidemia associated with type 2 diabetes mellitus ([facility])  E11.69 250.80    E78.2 272.2       Diabetes " "Mellitus    --------------------------------------------------------------------------------------------------------------------------------------------    Glycemic Management   Lab Results   Component Value Date    HGBA1C 7.7 10/06/2022           Ozempic, can't tolerate even small doses       Sandra 2 weeks report    TIR - 42%  Very high 13%  Lows 2%     No lows    Type 2 Diabetes w hyperglycemia    Tresiba  20 units , waking has improved    Novolog , doing 20 units w meals and still high   Increase to 25 and progressively increase , as much as 50   Doing 20 and working most of the time     Be consistent w diet     Approve for glucagon               ==========================================================================  Microvascular Complication Monitoring    Neuropathy                         Retinopathy     Renal     GFR   Lab Results   Component Value Date    EGFRIFNONA >60 11/23/2021    EGFRIFNONA 55 (L) 10/18/2021    EGFRIFNONA 67 06/24/2019       Albuminuria   Lab Results   Component Value Date    MALBCRERATIO 9 01/08/2016    MALBCRERATIO 6 04/29/2014    MALBCRERATIO Unable to Calculate 03/23/2014         ==========================================================================    Lipids  Lab Results   Component Value Date    CHOL 113 08/17/2022    CHLPL 110 04/11/2022    TRIG 224 (H) 08/17/2022    HDL 26 (L) 08/17/2022    LDL 51 08/17/2022        crestor 10 mg qhs   vascepa   ==========================================================================    HTN  /90   Pulse 119   Ht 182.9 cm (72\")   Wt 105 kg (231 lb)   SpO2 100%   BMI 31.33 kg/m²       Controlled   Not on BP meds     ==========================================================================    Bone Health    Vit D    Lab Results   Component Value Date    QWUE56YH 42.3 07/03/2017    [id number] 35.6 05/31/2016      ==========================================================================    Thyroid Assessment  Lab Results "   Component Value Date    TSH 3.920 04/14/2018       On amiodarone     ==========================================================================    Vitamin B12  Lab Results   Component Value Date    JSEYVWCU06 1,628 (H) 08/17/2022         ==========================================================================             Orders Placed This Encounter   Procedures   • Hemoglobin A1c     This order was created through External Result Entry     Order Specific Question:   Release to patient     Answer:   Routine Release                This document has been electronically signed by Almas Quezada MD on October 6, 2022 09:44 CDT

## 2022-10-17 ENCOUNTER — TRANSCRIBE ORDERS (OUTPATIENT)
Dept: LAB | Facility: HOSPITAL | Age: 67
End: 2022-10-17

## 2022-10-17 DIAGNOSIS — Z94.1 HEART REPLACED BY TRANSPLANT: Primary | ICD-10-CM

## 2022-10-19 ENCOUNTER — LAB (OUTPATIENT)
Dept: LAB | Facility: HOSPITAL | Age: 67
End: 2022-10-19

## 2022-10-19 DIAGNOSIS — Z94.1 HEART REPLACED BY TRANSPLANT: ICD-10-CM

## 2022-10-19 PROCEDURE — 36415 COLL VENOUS BLD VENIPUNCTURE: CPT

## 2022-10-19 PROCEDURE — 80197 ASSAY OF TACROLIMUS: CPT

## 2022-10-22 LAB — TACROLIMUS BLD LC/MS/MS-MCNC: 11.7 NG/ML (ref 2–20)

## 2022-11-02 ENCOUNTER — TRANSCRIBE ORDERS (OUTPATIENT)
Dept: LAB | Facility: HOSPITAL | Age: 67
End: 2022-11-02

## 2022-11-02 ENCOUNTER — LAB (OUTPATIENT)
Dept: LAB | Facility: HOSPITAL | Age: 67
End: 2022-11-02

## 2022-11-02 DIAGNOSIS — Z94.1 HEART REPLACED BY TRANSPLANT: ICD-10-CM

## 2022-11-02 DIAGNOSIS — Z94.1 HEART REPLACED BY TRANSPLANT: Primary | ICD-10-CM

## 2022-11-02 PROCEDURE — 36415 COLL VENOUS BLD VENIPUNCTURE: CPT

## 2022-11-02 PROCEDURE — 80197 ASSAY OF TACROLIMUS: CPT

## 2022-11-07 LAB — TACROLIMUS BLD LC/MS/MS-MCNC: 7.4 NG/ML (ref 2–20)

## 2022-11-14 ENCOUNTER — SPECIALTY PHARMACY (OUTPATIENT)
Dept: ENDOCRINOLOGY | Facility: CLINIC | Age: 67
End: 2022-11-14

## 2022-11-14 ENCOUNTER — TRANSCRIBE ORDERS (OUTPATIENT)
Dept: LAB | Facility: HOSPITAL | Age: 67
End: 2022-11-14

## 2022-11-14 DIAGNOSIS — Z94.1 HEART REPLACED BY TRANSPLANT: Primary | ICD-10-CM

## 2022-11-14 NOTE — PROGRESS NOTES
Specialty Pharmacy Refill Coordination Note     Chasity is a 67 y.o. male contacted today regarding refills of  fiasp specialty medication(s).    Reviewed and verified with patient:       Specialty medication(s) and dose(s) confirmed: yes    Refill Questions    Flowsheet Row Most Recent Value   Changes to allergies? No   Changes to medications? No   New conditions since last clinic visit No   Unplanned office visit, urgent care, ED, or hospital admission in the last 4 weeks  No   How does patient/caregiver feel medication is working? Good   Financial problems or insurance changes  No   Since the previous refill, were any specialty medication doses or scheduled injections missed or delayed?  No   Does this patient require a clinical escalation to a pharmacist? No          Delivery Questions    Flowsheet Row Most Recent Value   Delivery method FedEx   Delivery address correct? Yes   Number of medications in delivery 1   Medication being filled and delivered fiasp   Doses left of specialty medications 4   Is there any medication that is due not being filled? No   Supplies needed? No supplies needed   Cooler needed? Yes   Do any medications need mixed or dated? No   Copay form of payment Credit card on file   Questions or concerns for the pharmacist? No   Are any medications first time fills? No        Filling fiasp for the first time mailing out today per pt request.        Follow-up: 3 weeks.      Lexx Lagos  Specialty Pharmacy Technician

## 2022-11-16 ENCOUNTER — LAB (OUTPATIENT)
Dept: LAB | Facility: HOSPITAL | Age: 67
End: 2022-11-16

## 2022-11-16 PROCEDURE — 36415 COLL VENOUS BLD VENIPUNCTURE: CPT | Performed by: NURSE PRACTITIONER

## 2022-11-16 PROCEDURE — 80048 BASIC METABOLIC PNL TOTAL CA: CPT | Performed by: NURSE PRACTITIONER

## 2022-11-17 LAB
ANION GAP SERPL CALCULATED.3IONS-SCNC: 9.2 MMOL/L (ref 5–15)
BUN SERPL-MCNC: 38 MG/DL (ref 8–23)
BUN/CREAT SERPL: 15 (ref 7–25)
CALCIUM SPEC-SCNC: 9.3 MG/DL (ref 8.6–10.5)
CHLORIDE SERPL-SCNC: 106 MMOL/L (ref 98–107)
CO2 SERPL-SCNC: 22.8 MMOL/L (ref 22–29)
CREAT SERPL-MCNC: 2.54 MG/DL (ref 0.76–1.27)
EGFRCR SERPLBLD CKD-EPI 2021: 27 ML/MIN/1.73
GLUCOSE SERPL-MCNC: 156 MG/DL (ref 65–99)
POTASSIUM SERPL-SCNC: 4.9 MMOL/L (ref 3.5–5.2)
SODIUM SERPL-SCNC: 138 MMOL/L (ref 136–145)

## 2022-12-08 ENCOUNTER — TRANSCRIBE ORDERS (OUTPATIENT)
Dept: LAB | Facility: HOSPITAL | Age: 67
End: 2022-12-08

## 2022-12-08 DIAGNOSIS — Z94.1 HEART REPLACED BY TRANSPLANT: Primary | ICD-10-CM

## 2022-12-15 ENCOUNTER — LAB (OUTPATIENT)
Dept: LAB | Facility: HOSPITAL | Age: 67
End: 2022-12-15

## 2022-12-15 DIAGNOSIS — Z94.1 HEART REPLACED BY TRANSPLANT: ICD-10-CM

## 2022-12-15 PROCEDURE — 36415 COLL VENOUS BLD VENIPUNCTURE: CPT

## 2022-12-15 PROCEDURE — 80197 ASSAY OF TACROLIMUS: CPT

## 2022-12-20 LAB — TACROLIMUS BLD LC/MS/MS-MCNC: 7.1 NG/ML (ref 2–20)

## 2022-12-22 ENCOUNTER — TELEPHONE (OUTPATIENT)
Dept: ENDOCRINOLOGY | Facility: CLINIC | Age: 67
End: 2022-12-22

## 2022-12-22 NOTE — TELEPHONE ENCOUNTER
Pt called asking for a sample pen of insulin. He didn't specify which one, just for a sample pen, please.    Please advise.    Pt callback number [phone]

## 2022-12-28 ENCOUNTER — TRANSCRIBE ORDERS (OUTPATIENT)
Dept: LAB | Facility: HOSPITAL | Age: 67
End: 2022-12-28
Payer: MEDICARE

## 2022-12-28 DIAGNOSIS — E78.5 HYPERLIPIDEMIA, UNSPECIFIED HYPERLIPIDEMIA TYPE: Primary | ICD-10-CM

## 2022-12-29 ENCOUNTER — SPECIALTY PHARMACY (OUTPATIENT)
Dept: ENDOCRINOLOGY | Facility: CLINIC | Age: 67
End: 2022-12-29

## 2022-12-29 NOTE — PROGRESS NOTES
Specialty Pharmacy Refill Coordination Note     Chasity is a 67 y.o. male contacted today regarding refills of  fiasp specialty medication(s).    Reviewed and verified with patient:       Specialty medication(s) and dose(s) confirmed: yes    Refill Questions    Flowsheet Row Most Recent Value   Changes to allergies? No   Changes to medications? No   New conditions since last clinic visit No   Unplanned office visit, urgent care, ED, or hospital admission in the last 4 weeks  No   How does patient/caregiver feel medication is working? Good   Financial problems or insurance changes  No   Since the previous refill, were any specialty medication doses or scheduled injections missed or delayed?  No   Does this patient require a clinical escalation to a pharmacist? No          Delivery Questions    Flowsheet Row Most Recent Value   Delivery method FedEx   Delivery address correct? Yes   Delivery phone number [phone]   Number of medications in delivery 1   Medication being filled and delivered fiasp   Is there any medication that is due not being filled? No   Supplies needed? No supplies needed   Cooler needed? No   Do any medications need mixed or dated? No   Copay form of payment Credit card on file   Questions or concerns for the pharmacist? No   Are any medications first time fills? No                 Follow-up: 30 day(s)     Vianney Collado, Pharmacy Technician  Specialty Pharmacy Technician

## 2023-01-05 ENCOUNTER — LAB (OUTPATIENT)
Dept: LAB | Facility: HOSPITAL | Age: 68
End: 2023-01-05
Payer: MEDICARE

## 2023-01-05 DIAGNOSIS — E78.5 HYPERLIPIDEMIA, UNSPECIFIED HYPERLIPIDEMIA TYPE: ICD-10-CM

## 2023-01-05 LAB
CHOLEST SERPL-MCNC: 102 MG/DL (ref 0–200)
HDLC SERPL-MCNC: 31 MG/DL (ref 40–60)
LDLC SERPL CALC-MCNC: 46 MG/DL (ref 0–100)
LDLC/HDLC SERPL: 1.35 {RATIO}
TRIGL SERPL-MCNC: 146 MG/DL (ref 0–150)
VLDLC SERPL-MCNC: 25 MG/DL (ref 5–40)

## 2023-01-05 PROCEDURE — 36415 COLL VENOUS BLD VENIPUNCTURE: CPT

## 2023-01-05 PROCEDURE — 80061 LIPID PANEL: CPT

## 2023-01-23 ENCOUNTER — SPECIALTY PHARMACY (OUTPATIENT)
Dept: ENDOCRINOLOGY | Facility: CLINIC | Age: 68
End: 2023-01-23
Payer: MEDICARE

## 2023-01-23 ENCOUNTER — OFFICE VISIT (OUTPATIENT)
Dept: ENDOCRINOLOGY | Facility: CLINIC | Age: 68
End: 2023-01-23
Payer: MEDICARE

## 2023-01-23 VITALS
OXYGEN SATURATION: 97 % | HEART RATE: 119 BPM | SYSTOLIC BLOOD PRESSURE: 140 MMHG | DIASTOLIC BLOOD PRESSURE: 80 MMHG | HEIGHT: 72 IN | BODY MASS INDEX: 31.56 KG/M2 | WEIGHT: 233 LBS

## 2023-01-23 DIAGNOSIS — Z79.4 TYPE 2 DIABETES MELLITUS WITH HYPERGLYCEMIA, WITH LONG-TERM CURRENT USE OF INSULIN: Primary | ICD-10-CM

## 2023-01-23 DIAGNOSIS — E11.65 TYPE 2 DIABETES MELLITUS WITH HYPERGLYCEMIA, WITH LONG-TERM CURRENT USE OF INSULIN: Primary | ICD-10-CM

## 2023-01-23 DIAGNOSIS — E78.2 MIXED DIABETIC HYPERLIPIDEMIA ASSOCIATED WITH TYPE 2 DIABETES MELLITUS: ICD-10-CM

## 2023-01-23 DIAGNOSIS — E11.69 MIXED DIABETIC HYPERLIPIDEMIA ASSOCIATED WITH TYPE 2 DIABETES MELLITUS: ICD-10-CM

## 2023-01-23 PROCEDURE — 95251 CONT GLUC MNTR ANALYSIS I&R: CPT | Performed by: INTERNAL MEDICINE

## 2023-01-23 PROCEDURE — 99214 OFFICE O/P EST MOD 30 MIN: CPT | Performed by: INTERNAL MEDICINE

## 2023-01-23 RX ORDER — (INSULIN DEGLUDEC AND LIRAGLUTIDE) 100; 3.6 [IU]/ML; MG/ML
48 INJECTION, SOLUTION SUBCUTANEOUS DAILY
Qty: 15 ML | Refills: 11 | Status: SHIPPED | OUTPATIENT
Start: 2023-01-23 | End: 2023-01-23 | Stop reason: SDUPTHER

## 2023-01-23 RX ORDER — (INSULIN DEGLUDEC AND LIRAGLUTIDE) 100; 3.6 [IU]/ML; MG/ML
50 INJECTION, SOLUTION SUBCUTANEOUS DAILY
Qty: 15 ML | Refills: 11 | Status: SHIPPED | OUTPATIENT
Start: 2023-01-23

## 2023-01-23 RX ORDER — SEMAGLUTIDE 1.34 MG/ML
1 INJECTION, SOLUTION SUBCUTANEOUS WEEKLY
Qty: 9 ML | Refills: 3 | Status: SHIPPED | OUTPATIENT
Start: 2023-01-23 | End: 2023-01-23

## 2023-01-23 NOTE — PROGRESS NOTES
"Chief Complaint   Patient presents with   • Diabetes     T2       History of Present Illness    67 y.o. male     Diabetes Type 2    Duration - since age 56 y o  Complications -    CAD , sp Heart Transplant 2021  Stage IV CKD    No neuropathy    No Retinopathy , last eye exam 2020       Present Monitoring -      Fingersticks - 6 x daily      CGM -     Present Regimen -  Before Transplant Januvia 50 mg daily and Aic 6s   Basal , Bolus Insulin now and hyperglycmic    When he was on steroids he had adapted but when switched to cellcept we thought he could come down on insulin but having \"steroid like effect\"    Carb Intake -   Low Carb   Exercise -   Yes   Symptoms -     None   ==========================================  Physical Exam  /80   Pulse 119   Ht 182.9 cm (72\")   Wt 106 kg (233 lb)   SpO2 97%   BMI 31.60 kg/m²   AOx3  No goiter, no carotid bruit  RRR  CTA  No Edema     ==========================================    Laboratory Workup    Lab Results   Component Value Date    WBC 3.29 (L) 08/17/2022    HGB 9.3 (L) 08/17/2022    HCT 29.1 (L) 08/17/2022    MCV 86.6 08/17/2022     08/17/2022       Lab Results   Component Value Date    GLUCOSE 156 (H) 11/16/2022    BUN 38 (H) 11/16/2022    CREATININE 2.54 (H) 11/16/2022    EGFRIFNONA >60 11/23/2021    EGFRIFAFRI >60 11/23/2021    BCR 15.0 11/16/2022     11/16/2022    K 4.9 11/16/2022    CO2 22.8 11/16/2022    CALCIUM 9.3 11/16/2022    ALBUMIN 4.1 11/23/2021     (H) 02/05/2022    ALT 21 11/23/2021       Lab Results   Component Value Date    EGFRIFNONA >60 11/23/2021         ==========================================      ICD-10-CM ICD-9-CM   1. Type 2 diabetes mellitus with hyperglycemia, with long-term current use of insulin ([facility])  E11.65 250.00    Z79.4 790.29     V58.67   2. Mixed diabetic hyperlipidemia associated with type 2 diabetes mellitus ([facility])  E11.69 250.80    E78.2 272.2       Diabetes " "[place]    --------------------------------------------------------------------------------------------------------------------------------------------    Glycemic Management   Lab Results   Component Value Date    HGBA1C 7.7 10/06/2022           Ozempic, can't tolerate even small doses but while on cellcept and now off of cellcept  Too expensive , 171 dollars       Sandra 2 weeks report  Petr 10 to Jan 23, 2023      TIR - 39%  Very high 19%  Lows 0%     No lows    Type 2 Diabetes w hyperglycemia    Tresiba  20 units - he needs less there 16 ,  Change to xultophy     Novolog , doing 20 units w meals and still high   Needs at least 25 for supper      Be consistent w diet     Approve for glucagon               ==========================================================================  Microvascular Complication Monitoring    Neuropathy                       no  Retinopathy   2022 , no eye disease   Renal - HTN  /80   Pulse 119   Ht 182.9 cm (72\")   Wt 106 kg (233 lb)   SpO2 97%   BMI 31.60 kg/m²     Controlled   Not on BP meds   Nephrology managing , not on ACE  GFR     Lab Results   Component Value Date    EGFR 27.0 (L) 11/16/2022    EGFR 30.9 (L) 09/13/2022       Albuminuria   Lab Results   Component Value Date    MALBCRERATIO 9 01/08/2016    MALBCRERATIO 6 04/29/2014         ==========================================================================    Lipids  Lab Results   Component Value Date    CHOL 102 01/05/2023    CHLPL 110 04/11/2022    TRIG 146 01/05/2023    HDL 31 (L) 01/05/2023    LDL 46 01/05/2023        crestor 10 mg qhs   vascepa     ==========================================================================    Bone Health    Vit D    Lab Results   Component Value Date    IIZA39NI 42.3 07/03/2017    FBGU16CL 35.6 05/31/2016      ==========================================================================    Thyroid Assessment  Lab Results   Component Value Date    TSH 3.920 04/14/2018       On " amiodarone     ==========================================================================    Vitamin B12  Lab Results   Component Value Date    ZCKFAZQL82 1,628 (H) 08/17/2022         ==========================================================================             No orders of the defined types were placed in this encounter.               This document has been electronically signed by Almas Quezada MD on January 23, 2023 09:07 CST

## 2023-02-20 ENCOUNTER — SPECIALTY PHARMACY (OUTPATIENT)
Dept: ENDOCRINOLOGY | Facility: CLINIC | Age: 68
End: 2023-02-20
Payer: MEDICARE

## 2023-02-20 NOTE — PROGRESS NOTES
Specialty Pharmacy Refill Coordination Note     hCasity is a 67 y.o. male contacted today regarding refills of  Xultophy, fiasp specialty medication(s).    Reviewed and verified with patient:       Specialty medication(s) and dose(s) confirmed: yes  Pt had plenty of medication at this time and would like a call back in 3 weeks                  Follow-up: 3 week(s)     Vianney Collado, Pharmacy Technician  Specialty Pharmacy Technician

## 2023-02-23 ENCOUNTER — LAB (OUTPATIENT)
Dept: LAB | Facility: HOSPITAL | Age: 68
End: 2023-02-23
Payer: MEDICARE

## 2023-02-23 ENCOUNTER — TRANSCRIBE ORDERS (OUTPATIENT)
Dept: LAB | Facility: HOSPITAL | Age: 68
End: 2023-02-23
Payer: MEDICARE

## 2023-02-23 DIAGNOSIS — E87.5 HYPERKALEMIA: Primary | ICD-10-CM

## 2023-02-23 DIAGNOSIS — E87.5 HYPERKALEMIA: ICD-10-CM

## 2023-02-23 LAB
ANION GAP SERPL CALCULATED.3IONS-SCNC: 8 MMOL/L (ref 5–15)
BUN SERPL-MCNC: 14 MG/DL (ref 8–23)
BUN/CREAT SERPL: 19.7 (ref 7–25)
CALCIUM SPEC-SCNC: 8.8 MG/DL (ref 8.6–10.5)
CHLORIDE SERPL-SCNC: 103 MMOL/L (ref 98–107)
CO2 SERPL-SCNC: 26 MMOL/L (ref 22–29)
CREAT SERPL-MCNC: 0.71 MG/DL (ref 0.76–1.27)
EGFRCR SERPLBLD CKD-EPI 2021: 100.6 ML/MIN/1.73
GLUCOSE SERPL-MCNC: 61 MG/DL (ref 65–99)
POTASSIUM SERPL-SCNC: 3.9 MMOL/L (ref 3.5–5.2)
SODIUM SERPL-SCNC: 137 MMOL/L (ref 136–145)

## 2023-02-23 PROCEDURE — 80048 BASIC METABOLIC PNL TOTAL CA: CPT

## 2023-02-23 PROCEDURE — 36415 COLL VENOUS BLD VENIPUNCTURE: CPT

## 2023-02-27 ENCOUNTER — TELEPHONE (OUTPATIENT)
Dept: ENDOCRINOLOGY | Facility: CLINIC | Age: 68
End: 2023-02-27
Payer: MEDICARE

## 2023-02-27 NOTE — TELEPHONE ENCOUNTER
Patient is notified that a shipment is due to ship on March 1st from [facility].

## 2023-02-27 NOTE — TELEPHONE ENCOUNTER
It looks like he gets these from [facility]. I have contacted David from [facility] and he will get back to me.

## 2023-02-27 NOTE — TELEPHONE ENCOUNTER
Pt called stating he needs a new RX from Dr Menchaca for his Sandra 2 sensors. I did not see this on his med list and he wasn't sure what pharmacy. He said he receives these through the mail and that Dr Menchaca had set this up for him.    Please advise.    Thanks

## 2023-02-28 ENCOUNTER — DOCUMENTATION (OUTPATIENT)
Dept: ENDOCRINOLOGY | Facility: CLINIC | Age: 68
End: 2023-02-28
Payer: MEDICARE

## 2023-02-28 NOTE — PROGRESS NOTES
[facility] Sandra 2 sensors [facility] #[phone]. Confirmation received and sent to

## 2023-03-08 ENCOUNTER — DOCUMENTATION (OUTPATIENT)
Dept: ENDOCRINOLOGY | Facility: CLINIC | Age: 68
End: 2023-03-08
Payer: MEDICARE

## 2023-03-13 ENCOUNTER — SPECIALTY PHARMACY (OUTPATIENT)
Dept: ENDOCRINOLOGY | Facility: CLINIC | Age: 68
End: 2023-03-13
Payer: MEDICARE

## 2023-03-13 NOTE — PROGRESS NOTES
Specialty Pharmacy Refill Coordination Note     Chasity is a 67 y.o. male contacted today regarding refills of  Xultophy,fiasp  specialty medication(s).    Reviewed and verified with patient:       Specialty medication(s) and dose(s) confirmed: yes    Refill Questions    Flowsheet Row Most Recent Value   Changes to allergies? No   Changes to medications? No   Financial problems or insurance changes  No   Since the previous refill, were any specialty medication doses or scheduled injections missed or delayed?  No   Does this patient require a clinical escalation to a pharmacist? No          Delivery Questions    Flowsheet Row Most Recent Value   Delivery method FedEx   Delivery address correct? Yes   Delivery phone number [phone]   Number of medications in delivery 1   Medication being filled and delivered xultophy   Copay form of payment Credit card on file   Questions or concerns for the pharmacist? No        Pt states that he hardly has to use fiasp since he has started on xultophy.          Follow-up: 30 day(s)     Vianney Collado, Pharmacy Technician  Specialty Pharmacy Technician

## 2023-04-14 ENCOUNTER — OFFICE VISIT (OUTPATIENT)
Dept: ENDOCRINOLOGY | Facility: CLINIC | Age: 68
End: 2023-04-14
Payer: MEDICARE

## 2023-04-14 VITALS
OXYGEN SATURATION: 97 % | SYSTOLIC BLOOD PRESSURE: 130 MMHG | BODY MASS INDEX: 31.56 KG/M2 | HEIGHT: 72 IN | WEIGHT: 233 LBS | HEART RATE: 119 BPM | DIASTOLIC BLOOD PRESSURE: 90 MMHG

## 2023-04-14 DIAGNOSIS — Z79.4 TYPE 2 DIABETES MELLITUS WITH HYPERGLYCEMIA, WITH LONG-TERM CURRENT USE OF INSULIN: Primary | ICD-10-CM

## 2023-04-14 DIAGNOSIS — E78.2 MIXED DIABETIC HYPERLIPIDEMIA ASSOCIATED WITH TYPE 2 DIABETES MELLITUS: ICD-10-CM

## 2023-04-14 DIAGNOSIS — M85.88 OSTEOPENIA OF LUMBAR SPINE: ICD-10-CM

## 2023-04-14 DIAGNOSIS — E11.65 TYPE 2 DIABETES MELLITUS WITH HYPERGLYCEMIA, WITH LONG-TERM CURRENT USE OF INSULIN: Primary | ICD-10-CM

## 2023-04-14 DIAGNOSIS — E11.69 MIXED DIABETIC HYPERLIPIDEMIA ASSOCIATED WITH TYPE 2 DIABETES MELLITUS: ICD-10-CM

## 2023-04-14 LAB — HBA1C MFR BLD: 8.7 %

## 2023-04-14 RX ORDER — MYCOPHENOLIC ACID 360 MG/1
360 TABLET, DELAYED RELEASE ORAL EVERY 12 HOURS SCHEDULED
COMMUNITY

## 2023-04-14 NOTE — PROGRESS NOTES
"Chief Complaint   Patient presents with   • Diabetes     T2       History of Present Illness    67 y.o. male     Diabetes Type 2    Duration - since age 56 y o  Complications -    CAD , sp Heart Transplant 2021  Stage IV CKD    No neuropathy    No Retinopathy , last eye exam 2020       Present Monitoring -   Using frances 2       Present Regimen -  Before Transplant Januvia 50 mg daily and Aic 6s     Now on xultophy and novolog     On prograf and cellcept that raise his glucose   Carb Intake -   Low Carb   Exercise -   Yes   Symptoms -     None       ==========================================  Physical Exam  /90   Pulse 119   Ht 182.9 cm (72\")   Wt 106 kg (233 lb)   SpO2 97%   BMI 31.60 kg/m²   AOx3  No goiter, no carotid bruit  RRR  CTA  No Edema     ==========================================    Laboratory Workup    Lab Results   Component Value Date    WBC 3.29 (L) 08/17/2022    HGB 9.3 (L) 08/17/2022    HCT 29.1 (L) 08/17/2022    MCV 86.6 08/17/2022     08/17/2022       Lab Results   Component Value Date    GLUCOSE 61 (L) 02/23/2023    BUN 14 02/23/2023    CREATININE 0.71 (L) 02/23/2023    EGFRIFNONA >60 11/23/2021    EGFRIFAFRI >60 11/23/2021    BCR 19.7 02/23/2023     02/23/2023    K 3.9 02/23/2023    CO2 26.0 02/23/2023    CALCIUM 8.8 02/23/2023    ALBUMIN 4.1 11/23/2021     (H) 02/05/2022    ALT 21 11/23/2021       Lab Results   Component Value Date    EGFRIFNONA >60 11/23/2021         ==========================================      ICD-10-CM ICD-9-CM   1. Type 2 diabetes mellitus with hyperglycemia, with long-term current use of insulin  E11.65 250.00    Z79.4 790.29     V58.67   2. Mixed diabetic hyperlipidemia associated with type 2 diabetes mellitus  E11.69 250.80    E78.2 272.2   3. Osteopenia of lumbar spine  M85.88 733.90       Type 2 Diabetes    Lab Results   Component Value Date    HGBA1C 8.7 04/14/2023           Ambulatory Glucose Profile Report    Days Analyzed : 2 week " "period ending on 04/14/23      Continuous Glucose Monitory Device:  FreeStyle Sandra 2     - 32% very high target range  - 48% high target range  - 20% in target range  - 0% below target range  - GMI 8.7 %  - Average glucose 226 mg/dl    Interpretation : Diabetes Type 2 w hyperglycemia       In the past intolerant to low dose ozempic and expensive       xultophy 16 units every morning , please increase to 20     Every 2 days increase by 2 units until waking sugar is 80 to 130 or you have reached max dose of 50 units       Novolog  Doing 15 to 20 , needs enough so that 2 hours after the meal your sugar is not higher than 50 points from baseline and 4 hours later you should be the same or less than 4 hours ago.     Be consistent w diet     Has baqsimi            ==========================================================================  Microvascular Complication Monitoring    Neuropathy                       no  Retinopathy   2022 , no eye disease , due in May 2023   Renal - HTN  /90   Pulse 119   Ht 182.9 cm (72\")   Wt 106 kg (233 lb)   SpO2 97%   BMI 31.60 kg/m²     Controlled   Not on BP meds   Nephrology managing , not on ACE  GFR     Lab Results   Component Value Date    EGFR 100.6 02/23/2023    EGFR 27.0 (L) 11/16/2022       Albuminuria   Lab Results   Component Value Date    MALBCRERATIO 9 01/08/2016    MALBCRERATIO 6 04/29/2014         ==========================================================================    Lipids  Lab Results   Component Value Date    CHOL 102 01/05/2023    CHLPL 110 04/11/2022    TRIG 146 01/05/2023    HDL 31 (L) 01/05/2023    LDL 46 01/05/2023        crestor 10 mg qhs   vascepa     ==========================================================================    Bone Health    Vit D    Lab Results   Component Value Date    LVAM81QO 42.3 07/03/2017    NFVP78LV 35.6 05/31/2016     Osteopenia  2022 DXA at [employer]   Repeat dxa in 2024  Was on fosamax, started and stopped by " [place]     ==========================================================================    Thyroid Assessment  Lab Results   Component Value Date    TSH 3.920 04/14/2018       On amiodarone     ==========================================================================    Vitamin B12  Lab Results   Component Value Date    EIQUCECB29 1,628 (H) 08/17/2022         ==========================================================================     No orders of the defined types were placed in this encounter.      Orders Placed This Encounter   Procedures   • CBC Auto Differential   • Comprehensive Metabolic Panel   • Hemoglobin A1c   • Lipid Panel   • Microalbumin / Creatinine Urine Ratio - Urine, Clean Catch   • TSH   • Vitamin B12   • Hemoglobin A1c             This document has been electronically signed by Almas Quezada MD on April 14, 2023 09:03 CDT

## 2023-04-14 NOTE — PATIENT INSTRUCTIONS
xultophy 16 units every morning , please increase to 20     Every 2 days increase by 2 units until waking sugar is 80 to 130 or you have reached max dose of 50 units       Novolog  Doing 15 to 20 , needs enough so that 2 hours after the meal your sugar is not higher than 50 points from baseline and 4 hours later you should be the same or less than 4 hours ago.

## 2023-05-24 ENCOUNTER — TRANSCRIBE ORDERS (OUTPATIENT)
Dept: LAB | Facility: HOSPITAL | Age: 68
End: 2023-05-24
Payer: MEDICARE

## 2023-05-24 DIAGNOSIS — E87.5 HYPERKALEMIA: Primary | ICD-10-CM

## 2023-05-31 ENCOUNTER — SPECIALTY PHARMACY (OUTPATIENT)
Dept: ENDOCRINOLOGY | Facility: CLINIC | Age: 68
End: 2023-05-31

## 2023-05-31 NOTE — PROGRESS NOTES
Specialty Pharmacy Refill Coordination Note     Chasity is a 67 y.o. male contacted today regarding refills of  xultophy specialty medication(s).    Reviewed and verified with patient:       Specialty medication(s) and dose(s) confirmed: yes    Refill Questions    Flowsheet Row Most Recent Value   Changes to allergies? No   Changes to medications? No   New conditions since last clinic visit No   Unplanned office visit, urgent care, ED, or hospital admission in the last 4 weeks  No   How does patient/caregiver feel medication is working? Very good   Financial problems or insurance changes  No   Since the previous refill, were any specialty medication doses or scheduled injections missed or delayed?  No   Does this patient require a clinical escalation to a pharmacist? No          Delivery Questions    Flowsheet Row Most Recent Value   Delivery method FedEx   Delivery address correct? Yes   Delivery phone number [phone]   Number of medications in delivery 1   Medication being filled and delivered xultophy   Is there any medication that is due not being filled? No   Supplies needed? No supplies needed   Questions or concerns for the pharmacist? No   Are any medications first time fills? No        Mailing out Thursday         Follow-up: 1 month.      Lexx Lagos  Specialty Pharmacy Technician

## 2023-06-05 ENCOUNTER — LAB (OUTPATIENT)
Dept: LAB | Facility: HOSPITAL | Age: 68
End: 2023-06-05
Payer: MEDICARE

## 2023-06-05 DIAGNOSIS — Z79.4 TYPE 2 DIABETES MELLITUS WITH HYPERGLYCEMIA, WITH LONG-TERM CURRENT USE OF INSULIN: ICD-10-CM

## 2023-06-05 DIAGNOSIS — E87.5 HYPERKALEMIA: ICD-10-CM

## 2023-06-05 DIAGNOSIS — E11.69 MIXED DIABETIC HYPERLIPIDEMIA ASSOCIATED WITH TYPE 2 DIABETES MELLITUS: ICD-10-CM

## 2023-06-05 DIAGNOSIS — E78.2 MIXED DIABETIC HYPERLIPIDEMIA ASSOCIATED WITH TYPE 2 DIABETES MELLITUS: ICD-10-CM

## 2023-06-05 DIAGNOSIS — E11.65 TYPE 2 DIABETES MELLITUS WITH HYPERGLYCEMIA, WITH LONG-TERM CURRENT USE OF INSULIN: ICD-10-CM

## 2023-06-05 LAB
ALBUMIN SERPL-MCNC: 4.2 G/DL (ref 3.5–5.2)
ALBUMIN UR-MCNC: <1.2 MG/DL
ALBUMIN/GLOB SERPL: 1.4 G/DL
ALP SERPL-CCNC: 84 U/L (ref 39–117)
ALT SERPL W P-5'-P-CCNC: 12 U/L (ref 1–41)
ANION GAP SERPL CALCULATED.3IONS-SCNC: 11 MMOL/L (ref 5–15)
AST SERPL-CCNC: 12 U/L (ref 1–40)
BASOPHILS # BLD AUTO: 0.04 10*3/MM3 (ref 0–0.2)
BASOPHILS NFR BLD AUTO: 0.5 % (ref 0–1.5)
BILIRUB SERPL-MCNC: 0.6 MG/DL (ref 0–1.2)
BUN SERPL-MCNC: 35 MG/DL (ref 8–23)
BUN/CREAT SERPL: 17.2 (ref 7–25)
CALCIUM SPEC-SCNC: 8.9 MG/DL (ref 8.6–10.5)
CHLORIDE SERPL-SCNC: 110 MMOL/L (ref 98–107)
CHOLEST SERPL-MCNC: 82 MG/DL (ref 0–200)
CO2 SERPL-SCNC: 22 MMOL/L (ref 22–29)
CREAT SERPL-MCNC: 2.03 MG/DL (ref 0.76–1.27)
CREAT UR-MCNC: 103.3 MG/DL
DEPRECATED RDW RBC AUTO: 39.8 FL (ref 37–54)
EGFRCR SERPLBLD CKD-EPI 2021: 35.3 ML/MIN/1.73
EOSINOPHIL # BLD AUTO: 0.14 10*3/MM3 (ref 0–0.4)
EOSINOPHIL NFR BLD AUTO: 1.9 % (ref 0.3–6.2)
ERYTHROCYTE [DISTWIDTH] IN BLOOD BY AUTOMATED COUNT: 13.1 % (ref 12.3–15.4)
GLOBULIN UR ELPH-MCNC: 3 GM/DL
GLUCOSE SERPL-MCNC: 150 MG/DL (ref 65–99)
HBA1C MFR BLD: 7.8 % (ref 4.8–5.6)
HCT VFR BLD AUTO: 37.2 % (ref 37.5–51)
HDLC SERPL-MCNC: 27 MG/DL (ref 40–60)
HGB BLD-MCNC: 12 G/DL (ref 13–17.7)
IMM GRANULOCYTES # BLD AUTO: 0.03 10*3/MM3 (ref 0–0.05)
IMM GRANULOCYTES NFR BLD AUTO: 0.4 % (ref 0–0.5)
LDLC SERPL CALC-MCNC: 31 MG/DL (ref 0–100)
LDLC/HDLC SERPL: 1.01 {RATIO}
LYMPHOCYTES # BLD AUTO: 1.44 10*3/MM3 (ref 0.7–3.1)
LYMPHOCYTES NFR BLD AUTO: 19.6 % (ref 19.6–45.3)
MCH RBC QN AUTO: 27.5 PG (ref 26.6–33)
MCHC RBC AUTO-ENTMCNC: 32.3 G/DL (ref 31.5–35.7)
MCV RBC AUTO: 85.3 FL (ref 79–97)
MICROALBUMIN/CREAT UR: NORMAL MG/G{CREAT}
MONOCYTES # BLD AUTO: 0.5 10*3/MM3 (ref 0.1–0.9)
MONOCYTES NFR BLD AUTO: 6.8 % (ref 5–12)
NEUTROPHILS NFR BLD AUTO: 5.21 10*3/MM3 (ref 1.7–7)
NEUTROPHILS NFR BLD AUTO: 70.8 % (ref 42.7–76)
NRBC BLD AUTO-RTO: 0 /100 WBC (ref 0–0.2)
PLATELET # BLD AUTO: 193 10*3/MM3 (ref 140–450)
PMV BLD AUTO: 10.9 FL (ref 6–12)
POTASSIUM SERPL-SCNC: 5.2 MMOL/L (ref 3.5–5.2)
PROT SERPL-MCNC: 7.2 G/DL (ref 6–8.5)
RBC # BLD AUTO: 4.36 10*6/MM3 (ref 4.14–5.8)
SODIUM SERPL-SCNC: 143 MMOL/L (ref 136–145)
TRIGL SERPL-MCNC: 139 MG/DL (ref 0–150)
TSH SERPL DL<=0.05 MIU/L-ACNC: 2.86 UIU/ML (ref 0.27–4.2)
VIT B12 BLD-MCNC: 978 PG/ML (ref 211–946)
VLDLC SERPL-MCNC: 24 MG/DL (ref 5–40)
WBC NRBC COR # BLD: 7.36 10*3/MM3 (ref 3.4–10.8)

## 2023-06-05 PROCEDURE — 82607 VITAMIN B-12: CPT

## 2023-06-05 PROCEDURE — 83036 HEMOGLOBIN GLYCOSYLATED A1C: CPT

## 2023-06-05 PROCEDURE — 82570 ASSAY OF URINE CREATININE: CPT

## 2023-06-05 PROCEDURE — 85025 COMPLETE CBC W/AUTO DIFF WBC: CPT

## 2023-06-05 PROCEDURE — 80053 COMPREHEN METABOLIC PANEL: CPT

## 2023-06-05 PROCEDURE — 80061 LIPID PANEL: CPT

## 2023-06-05 PROCEDURE — 84443 ASSAY THYROID STIM HORMONE: CPT

## 2023-06-05 PROCEDURE — 36415 COLL VENOUS BLD VENIPUNCTURE: CPT

## 2023-06-05 PROCEDURE — 82043 UR ALBUMIN QUANTITATIVE: CPT

## 2023-06-12 ENCOUNTER — TRANSCRIBE ORDERS (OUTPATIENT)
Dept: LAB | Facility: HOSPITAL | Age: 68
End: 2023-06-12
Payer: MEDICARE

## 2023-06-12 DIAGNOSIS — E87.5 HYPERKALEMIA: Primary | ICD-10-CM

## 2023-06-13 ENCOUNTER — OFFICE VISIT (OUTPATIENT)
Dept: ENDOCRINOLOGY | Facility: CLINIC | Age: 68
End: 2023-06-13
Payer: MEDICARE

## 2023-06-13 VITALS
DIASTOLIC BLOOD PRESSURE: 80 MMHG | HEART RATE: 118 BPM | SYSTOLIC BLOOD PRESSURE: 120 MMHG | HEIGHT: 72 IN | WEIGHT: 231 LBS | OXYGEN SATURATION: 97 % | BODY MASS INDEX: 31.29 KG/M2

## 2023-06-13 DIAGNOSIS — E11.69 MIXED DIABETIC HYPERLIPIDEMIA ASSOCIATED WITH TYPE 2 DIABETES MELLITUS: ICD-10-CM

## 2023-06-13 DIAGNOSIS — E11.21 TYPE 2 DIABETES WITH NEPHROPATHY: Primary | ICD-10-CM

## 2023-06-13 DIAGNOSIS — M85.88 OSTEOPENIA OF LUMBAR SPINE: ICD-10-CM

## 2023-06-13 DIAGNOSIS — E11.21 DIABETIC NEPHROPATHY ASSOCIATED WITH TYPE 2 DIABETES MELLITUS: ICD-10-CM

## 2023-06-13 DIAGNOSIS — E78.2 MIXED DIABETIC HYPERLIPIDEMIA ASSOCIATED WITH TYPE 2 DIABETES MELLITUS: ICD-10-CM

## 2023-06-13 PROCEDURE — 80048 BASIC METABOLIC PNL TOTAL CA: CPT | Performed by: NURSE PRACTITIONER

## 2023-06-13 RX ORDER — SODIUM ZIRCONIUM CYCLOSILICATE 10 G/10G
10 POWDER, FOR SUSPENSION ORAL DAILY
COMMUNITY

## 2023-06-13 RX ORDER — DAPAGLIFLOZIN 10 MG/1
TABLET, FILM COATED ORAL DAILY
COMMUNITY

## 2023-06-13 NOTE — PROGRESS NOTES
"Chief Complaint   Patient presents with   • Diabetes     T2       History of Present Illness    67 y.o. male     Diabetes Type 2    Duration - since age 56 y o  Complications -    CAD , sp Heart Transplant 2021 2023 heart transplant EF 58%   Stage IIIb     No neuropathy    No Retinopathy , last eye exam 2020       Present Monitoring -   Using frances 2       Present Regimen -  Before Transplant Januvia 50 mg daily and Aic 6s     Now on xultophy and novolog  And farxiga     On prograf and cellcept that raise his glucose   Carb Intake -   Low Carb   Exercise -   Yes   Symptoms -     None       ==========================================  Physical Exam  /80   Pulse 118   Ht 182.9 cm (72\")   Wt 105 kg (231 lb)   SpO2 97%   BMI 31.33 kg/m²   AOx3  No goiter, no carotid bruit  RRR  CTA  No Edema     ==========================================    Laboratory Workup    Lab Results   Component Value Date    WBC 7.36 06/05/2023    HGB 12.0 (L) 06/05/2023    HCT 37.2 (L) 06/05/2023    MCV 85.3 06/05/2023     06/05/2023       Lab Results   Component Value Date    GLUCOSE 150 (H) 06/05/2023    BUN 35 (H) 06/05/2023    CREATININE 2.03 (H) 06/05/2023    EGFRIFNONA >60 11/23/2021    EGFRIFAFRI >60 11/23/2021    BCR 17.2 06/05/2023     06/05/2023    K 5.2 06/05/2023    CO2 22.0 06/05/2023    CALCIUM 8.9 06/05/2023    ALBUMIN 4.2 06/05/2023    AST 12 06/05/2023    ALT 12 06/05/2023         Lab Results   Component Value Date    EGFR 35.3 (L) 06/05/2023    EGFR 100.6 02/23/2023         ==========================================      ICD-10-CM ICD-9-CM   1. Type 2 diabetes with nephropathy  E11.21 250.40     583.81   2. Mixed diabetic hyperlipidemia associated with type 2 diabetes mellitus  E11.69 250.80    E78.2 272.2   3. Osteopenia of lumbar spine  M85.88 733.90   4. Diabetic nephropathy associated with type 2 diabetes mellitus  E11.21 250.40     583.81       Type 2 Diabetes    Lab Results   Component Value Date " "   HGBA1C 7.80 (H) 06/05/2023           Ambulatory Glucose Profile Report    Days Analyzed : 2 week period ending on 06/13/23      Continuous Glucose Monitory Device:  FreeStyle Sandra 2  -    - Much improvement before xultophy , TIR was 20%     TIR 77%   no lows  Very high 1 %    Interpretation : Diabetes Type 2 w hyperglycemia       In the past intolerant to low dose ozempic and expensive       xultophy 16 units every morning , please increase to 20 - doing 25     novolog about 10  w meals, and he has noticed 1:10 correction factor    Be consistent w diet     Has baqsimi    Nephrology added farxiga         ==========================================================================  Microvascular Complication Monitoring    Neuropathy                       no  Retinopathy   2022 , no eye disease , due in May 2023   Renal - HTN  /80   Pulse 118   Ht 182.9 cm (72\")   Wt 105 kg (231 lb)   SpO2 97%   BMI 31.33 kg/m²     Controlled   Not on BP meds   Nephrology managing , not on ACE , but they added farxiga and potassium binder   No kerendia , due to K elevation   Nephrology team is at [place]       GFR     Lab Results   Component Value Date    EGFR 35.3 (L) 06/05/2023    EGFR 100.6 02/23/2023       Albuminuria   Lab Results   Component Value Date    MALBCRERATIO  06/05/2023      Comment:      Unable to calculate    MALBCRERATIO 9 01/08/2016         ==========================================================================    Lipids  Lab Results   Component Value Date    CHOL 82 06/05/2023    CHLPL 110 04/11/2022    TRIG 139 06/05/2023    HDL 27 (L) 06/05/2023    LDL 31 06/05/2023        crestor 10 mg qhs   vascepa     ==========================================================================    Bone Health    Vit D    Lab Results   Component Value Date    OFNL67FF 42.3 07/03/2017    RQXP66PK 35.6 05/31/2016     Osteopenia  2022 DXA at [place]   Repeat dxa in 2024  Was on fosamax, started and stopped by " [place]     ==========================================================================    Thyroid Assessment  Lab Results   Component Value Date    TSH 2.860 06/05/2023       On amiodarone     ==========================================================================    Vitamin B12  Lab Results   Component Value Date    TVDOMERW60 978 (H) 06/05/2023         ==========================================================================     No orders of the defined types were placed in this encounter.      Orders Placed This Encounter   Procedures   • CBC Auto Differential   • Comprehensive Metabolic Panel   • Hemoglobin A1c   • Lipid Panel   • Microalbumin / Creatinine Urine Ratio - Urine, Clean Catch   • TSH   • Vitamin B12             This document has been electronically signed by Almas Quezada MD on June 13, 2023 13:27 CDT

## 2023-07-25 ENCOUNTER — SPECIALTY PHARMACY (OUTPATIENT)
Dept: ENDOCRINOLOGY | Facility: CLINIC | Age: 68
End: 2023-07-25
Payer: MEDICARE

## 2023-07-25 NOTE — PROGRESS NOTES
Specialty Pharmacy Refill Coordination Note     Chasity is a 67 y.o. male contacted today regarding refills of  Xultophy specialty medication(s).    Reviewed and verified with patient:       Specialty medication(s) and dose(s) confirmed: yes    Refill Questions      Flowsheet Row Most Recent Value   Changes to allergies? No   Changes to medications? No   New conditions since last clinic visit No   Unplanned office visit, urgent care, ED, or hospital admission in the last 4 weeks  No   How does patient/caregiver feel medication is working? Very good   Financial problems or insurance changes  No   Since the previous refill, were any specialty medication doses or scheduled injections missed or delayed?  No   Does this patient require a clinical escalation to a pharmacist? No            Delivery Questions      Flowsheet Row Most Recent Value   Delivery method FedEx   Delivery address correct? Yes   Preferred delivery time? Anytime   Medication being filled and delivered Xultophy   Doses left of specialty medications 7   Is there any medication that is due not being filled? No   Supplies needed? No supplies needed   Cooler needed? Yes   Do any medications need mixed or dated? No   Copay form of payment Credit card on file   Questions or concerns for the pharmacist? No   Are any medications first time fills? No                   Follow-up: 1 month(s)     David Escobar, Pharmacy Technician  Specialty Pharmacy Technician

## 2023-08-03 ENCOUNTER — DOCUMENTATION (OUTPATIENT)
Dept: ENDOCRINOLOGY | Facility: CLINIC | Age: 68
End: 2023-08-03
Payer: MEDICARE

## 2023-08-03 DIAGNOSIS — E11.21 TYPE 2 DIABETES WITH NEPHROPATHY: Primary | ICD-10-CM

## 2023-08-03 RX ORDER — PEN NEEDLE, DIABETIC 30 GX3/16"
1 NEEDLE, DISPOSABLE MISCELLANEOUS 4 TIMES DAILY
Qty: 100 EACH | Refills: 11 | Status: SHIPPED | OUTPATIENT
Start: 2023-08-03

## 2023-09-05 ENCOUNTER — TRANSCRIBE ORDERS (OUTPATIENT)
Dept: LAB | Facility: HOSPITAL | Age: 68
End: 2023-09-05
Payer: MEDICARE

## 2023-09-05 DIAGNOSIS — Z94.1 TRANSPLANTED HEART: Primary | ICD-10-CM

## 2023-09-07 ENCOUNTER — LAB (OUTPATIENT)
Dept: LAB | Facility: HOSPITAL | Age: 68
End: 2023-09-07
Payer: MEDICARE

## 2023-09-07 DIAGNOSIS — Z94.1 TRANSPLANTED HEART: ICD-10-CM

## 2023-09-07 DIAGNOSIS — E11.69 MIXED DIABETIC HYPERLIPIDEMIA ASSOCIATED WITH TYPE 2 DIABETES MELLITUS: ICD-10-CM

## 2023-09-07 DIAGNOSIS — M85.88 OSTEOPENIA OF LUMBAR SPINE: ICD-10-CM

## 2023-09-07 DIAGNOSIS — E11.21 TYPE 2 DIABETES WITH NEPHROPATHY: ICD-10-CM

## 2023-09-07 DIAGNOSIS — E78.2 MIXED DIABETIC HYPERLIPIDEMIA ASSOCIATED WITH TYPE 2 DIABETES MELLITUS: ICD-10-CM

## 2023-09-07 LAB
ALBUMIN SERPL-MCNC: 4.1 G/DL (ref 3.5–5.2)
ALBUMIN/GLOB SERPL: 1.2 G/DL
ALP SERPL-CCNC: 73 U/L (ref 39–117)
ALT SERPL W P-5'-P-CCNC: 12 U/L (ref 1–41)
ANION GAP SERPL CALCULATED.3IONS-SCNC: 10 MMOL/L (ref 5–15)
AST SERPL-CCNC: 19 U/L (ref 1–40)
BASOPHILS # BLD AUTO: 0.05 10*3/MM3 (ref 0–0.2)
BASOPHILS NFR BLD AUTO: 1.5 % (ref 0–1.5)
BILIRUB SERPL-MCNC: 0.8 MG/DL (ref 0–1.2)
BUN SERPL-MCNC: 54 MG/DL (ref 8–23)
BUN/CREAT SERPL: 21.9 (ref 7–25)
CALCIUM SPEC-SCNC: 9.5 MG/DL (ref 8.6–10.5)
CHLORIDE SERPL-SCNC: 107 MMOL/L (ref 98–107)
CHOLEST SERPL-MCNC: 85 MG/DL (ref 0–200)
CO2 SERPL-SCNC: 24 MMOL/L (ref 22–29)
CREAT SERPL-MCNC: 2.47 MG/DL (ref 0.76–1.27)
DEPRECATED RDW RBC AUTO: 39.1 FL (ref 37–54)
EGFRCR SERPLBLD CKD-EPI 2021: 27.9 ML/MIN/1.73
EOSINOPHIL # BLD AUTO: 0.11 10*3/MM3 (ref 0–0.4)
EOSINOPHIL NFR BLD AUTO: 3.2 % (ref 0.3–6.2)
ERYTHROCYTE [DISTWIDTH] IN BLOOD BY AUTOMATED COUNT: 12.9 % (ref 12.3–15.4)
GLOBULIN UR ELPH-MCNC: 3.4 GM/DL
GLUCOSE SERPL-MCNC: 108 MG/DL (ref 65–99)
HBA1C MFR BLD: 7.8 % (ref 4.8–5.6)
HCT VFR BLD AUTO: 37.6 % (ref 37.5–51)
HDLC SERPL-MCNC: 25 MG/DL (ref 40–60)
HGB BLD-MCNC: 12.1 G/DL (ref 13–17.7)
IMM GRANULOCYTES # BLD AUTO: 0.02 10*3/MM3 (ref 0–0.05)
IMM GRANULOCYTES NFR BLD AUTO: 0.6 % (ref 0–0.5)
LDLC SERPL CALC-MCNC: 31 MG/DL (ref 0–100)
LDLC/HDLC SERPL: 1.02 {RATIO}
LYMPHOCYTES # BLD AUTO: 1.24 10*3/MM3 (ref 0.7–3.1)
LYMPHOCYTES NFR BLD AUTO: 36.4 % (ref 19.6–45.3)
MCH RBC QN AUTO: 27.1 PG (ref 26.6–33)
MCHC RBC AUTO-ENTMCNC: 32.2 G/DL (ref 31.5–35.7)
MCV RBC AUTO: 84.1 FL (ref 79–97)
MONOCYTES # BLD AUTO: 0.51 10*3/MM3 (ref 0.1–0.9)
MONOCYTES NFR BLD AUTO: 15 % (ref 5–12)
NEUTROPHILS NFR BLD AUTO: 1.48 10*3/MM3 (ref 1.7–7)
NEUTROPHILS NFR BLD AUTO: 43.3 % (ref 42.7–76)
NRBC BLD AUTO-RTO: 0 /100 WBC (ref 0–0.2)
PLATELET # BLD AUTO: 171 10*3/MM3 (ref 140–450)
PMV BLD AUTO: 11.6 FL (ref 6–12)
POTASSIUM SERPL-SCNC: 4.4 MMOL/L (ref 3.5–5.2)
PROT SERPL-MCNC: 7.5 G/DL (ref 6–8.5)
RBC # BLD AUTO: 4.47 10*6/MM3 (ref 4.14–5.8)
SODIUM SERPL-SCNC: 141 MMOL/L (ref 136–145)
TRIGL SERPL-MCNC: 173 MG/DL (ref 0–150)
TSH SERPL DL<=0.05 MIU/L-ACNC: 2.99 UIU/ML (ref 0.27–4.2)
VLDLC SERPL-MCNC: 29 MG/DL (ref 5–40)
WBC NRBC COR # BLD: 3.41 10*3/MM3 (ref 3.4–10.8)

## 2023-09-07 PROCEDURE — 80053 COMPREHEN METABOLIC PANEL: CPT

## 2023-09-07 PROCEDURE — 80061 LIPID PANEL: CPT

## 2023-09-07 PROCEDURE — 84443 ASSAY THYROID STIM HORMONE: CPT

## 2023-09-07 PROCEDURE — 80197 ASSAY OF TACROLIMUS: CPT

## 2023-09-07 PROCEDURE — 82607 VITAMIN B-12: CPT

## 2023-09-07 PROCEDURE — 85025 COMPLETE CBC W/AUTO DIFF WBC: CPT

## 2023-09-07 PROCEDURE — 36415 COLL VENOUS BLD VENIPUNCTURE: CPT

## 2023-09-07 PROCEDURE — 83036 HEMOGLOBIN GLYCOSYLATED A1C: CPT

## 2023-09-07 PROCEDURE — 82570 ASSAY OF URINE CREATININE: CPT

## 2023-09-07 PROCEDURE — 82043 UR ALBUMIN QUANTITATIVE: CPT

## 2023-09-08 ENCOUNTER — SPECIALTY PHARMACY (OUTPATIENT)
Dept: ENDOCRINOLOGY | Facility: CLINIC | Age: 68
End: 2023-09-08
Payer: MEDICARE

## 2023-09-08 LAB
ALBUMIN UR-MCNC: <1.2 MG/DL
CREAT UR-MCNC: 95.7 MG/DL
MICROALBUMIN/CREAT UR: NORMAL MG/G{CREAT}
VIT B12 BLD-MCNC: 495 PG/ML (ref 211–946)

## 2023-09-08 NOTE — PROGRESS NOTES
Specialty Pharmacy Refill Coordination Note     Chasity is a 67 y.o. male contacted today regarding refills of  xultophy specialty medication(s).    Reviewed and verified with patient:       Specialty medication(s) and dose(s) confirmed: yes    Refill Questions      Flowsheet Row Most Recent Value   Changes to allergies? No   Changes to medications? No   New conditions since last clinic visit No   Unplanned office visit, urgent care, ED, or hospital admission in the last 4 weeks  No   How does patient/caregiver feel medication is working? Good   Financial problems or insurance changes  No   Since the previous refill, were any specialty medication doses or scheduled injections missed or delayed?  No   Does this patient require a clinical escalation to a pharmacist? No            Delivery Questions      Flowsheet Row Most Recent Value   Delivery method FedEx   Delivery address correct? Yes   Delivery phone number [phone]   Number of medications in delivery 1   Medication being filled and delivered xultophy   Is there any medication that is due not being filled? No   Cooler needed? Yes   Do any medications need mixed or dated? No   Copay form of payment Credit card on file   Questions or concerns for the pharmacist? No   Are any medications first time fills? No                   Follow-up: 1 month      Genie Chiu, Pharmacy Intern  Specialty Pharmacy Technician

## 2023-09-11 LAB — TACROLIMUS BLD LC/MS/MS-MCNC: 6.8 NG/ML (ref 2–20)

## 2023-09-28 ENCOUNTER — SPECIALTY PHARMACY (OUTPATIENT)
Dept: ENDOCRINOLOGY | Facility: CLINIC | Age: 68
End: 2023-09-28
Payer: MEDICARE

## 2023-09-28 NOTE — PROGRESS NOTES
"   Specialty Pharmacy Patient Management Program  Endocrinology Reassessment     Chasity Xavier is a 67 y.o. male seen by an Endocrinology provider for Type 2 Diabetes and enrolled in the Endocrinology Patient Management program offered by [facility].  A follow-up outreach was conducted, including assessment of continued therapy appropriateness, medication adherence, and side effect incidence and management for Xultophy.    Changes to Insurance Coverage or Financial Support  None    Relevant Past Medical History and Comorbidities  Relevant medical history and concomitant health conditions were discussed with the patient. The patient's chart has been reviewed for relevant past medical history and comorbid health conditions and updated as necessary.   Past Medical History:   Diagnosis Date    Chronic kidney disease     pt has one kidney from birth    Coronary atherosclerosis     Hyperlipidemia     Ischemic cardiomyopathy     Obesity     DULCE MARIA (obstructive sleep apnea)     Osteoarthritis     Solitary kidney, congenital     Type 2 diabetes mellitus      Social History     Socioeconomic History    Marital status:    Tobacco Use    Smoking status: Never    Smokeless tobacco: Never   Substance and Sexual Activity    Alcohol use: No    Drug use: No    Sexual activity: Defer          Hospitalizations and Urgent Care Since Last Assessment  ED Visits, Admissions, or Hospitalizations: 0  Urgent Office Visits: 0    Allergies  Known allergies and reactions were discussed with the patient. The patient's chart has been reviewed for allergy information and updated as necessary.   Allergies   Allergen Reactions    Brilinta [Ticagrelor]      \"I just can't take it\"    Lisinopril Cough          Relevant Laboratory Values  A1C Last 3 Results          4/14/2023    00:00 6/5/2023    08:12 9/7/2023    08:39   HGBA1C Last 3 Results   Hemoglobin A1C 8.7     7.80  7.80       Details          This result is from " an external source.             Lab Results   Component Value Date    HGBA1C 7.80 (H) 09/07/2023     Lab Results   Component Value Date    GLUCOSE 108 (H) 09/07/2023    CALCIUM 9.5 09/07/2023     09/07/2023    K 4.4 09/07/2023    CO2 24.0 09/07/2023     09/07/2023    BUN 54 (H) 09/07/2023    CREATININE 2.47 (H) 09/07/2023    EGFRIFAFRI >60 11/23/2021    EGFRIFNONA >60 11/23/2021    BCR 21.9 09/07/2023    ANIONGAP 10.0 09/07/2023     Lab Results   Component Value Date    CHOL 85 09/07/2023    CHLPL 110 04/11/2022    TRIG 173 (H) 09/07/2023    HDL 25 (L) 09/07/2023    LDL 31 09/07/2023     Microalbumin          6/5/2023    08:23 9/7/2023    08:47   Microalbumin   Microalbumin, Urine <1.2  <1.2      Current Medication List  This medication list has been reviewed with the patient and evaluated for any interactions or necessary modifications/recommendations, and updated to include all prescription medications, OTC medications, and supplements the patient is currently taking.  This list reflects what is contained in the patient's profile, which has also been marked as reviewed to communicate to other providers it is the most up to date version of the patient's current medication therapy.     Current Outpatient Medications:     aspirin 81 MG EC tablet, Take 1 tablet by mouth Daily., Disp: , Rfl:     bumetanide (BUMEX) 1 MG tablet, Take 1 tablet by mouth Daily As Needed (weight gain 3-5lbs in a day)., Disp: , Rfl:     Calcium 600-400 MG-UNIT chewable tablet, Chew 2 (Two) Times a Day., Disp: , Rfl:     dapagliflozin Propanediol (Farxiga) 10 MG tablet, Take  by mouth Daily., Disp: , Rfl:     ferrous sulfate 325 (65 FE) MG tablet, Take 1 tablet by mouth. Takes on Mon, Wed, and Fri, Disp: , Rfl:     Glucagon (Baqsimi One Pack) 3 MG/DOSE powder, Use 1 actuation into one nostril as directed by provider as needed for low blood sugar., Disp: 2 each, Rfl: 11    glucose blood test strip, Accu-Chek Xin Plus In Vitro  Strip; Patient Sig: Accu-Chek Xin Plus In Vitro Strip ; 200; 0; 13-May-2013; Active, Disp: , Rfl:     Insulin Aspart, w/Niacinamide, (Fiasp FlexTouch) 100 UNIT/ML solution pen-injector, Inject up to 50 units under the appropriate area of skin three times daily with meals., Disp: 45 mL, Rfl: 3    Insulin Degludec-Liraglutide (Xultophy) 100-3.6 UNIT-MG/ML solution pen-injector subcutaneous pen, Inject 50 Units under the skin into the appropriate area as directed Daily., Disp: 15 mL, Rfl: 11    Insulin Pen Needle (Pen Needles) 32G X 4 MM misc, 1 each 4 (Four) Times a Day. Use 4 x daily, Dx code E11.65, Disp: 100 each, Rfl: 11    magnesium oxide (MAGOX) 400 (241.3 MG) MG tablet tablet, Take 1 tablet by mouth 2 (Two) Times a Day. Takes 2 twice a day, Disp: , Rfl:     mycophenolate (MYFORTIC) 360 MG tablet delayed-release EC tablet, Take 1 tablet by mouth Every 12 (Twelve) Hours., Disp: , Rfl:     Mycophenolate Sodium (MYCOPHENOLIC ACID PO), Take  by mouth 2 (Two) Times a Day. (Patient not taking: Reported on 4/14/2023), Disp: , Rfl:     rosuvastatin (CRESTOR) 10 MG tablet, Take 1 tablet by mouth Daily., Disp: , Rfl:     sodium zirconium cyclosilicate (Lokelma) 10 g pack, Take 10 g by mouth Daily., Disp: , Rfl:     tacrolimus (PROGRAF) 0.5 MG capsule, Take 0.5 mg by mouth Every Morning. (Patient not taking: Reported on 4/14/2023), Disp: , Rfl:     tacrolimus (PROGRAF) 1 MG capsule, Take 1 capsule by mouth 2 (Two) Times a Day., Disp: , Rfl:     tacrolimus (PROGRAF) 5 MG capsule, Take 1 capsule by mouth 2 (Two) Times a Day., Disp: , Rfl:          Drug Interactions  None    Recommended Medications Assessment  Aspirin: Currently Taking   Statin: Currently Taking   ACEi/ARB: Not Indicated    Adverse Drug Reactions  None    Adherence, Self-Administration, and Current Therapy Problems  Adherence related to the patient's specialty therapy was discussed with the patient.     Goals of Therapy  Goals related to the patient's  specialty therapy were discussed with the patient. Reviewed with pt to continue with current medication regimen.      Quality of Life Assessment   Quality of Life related to the patient's enrollment in the patient management program and services provided was discussed with the patient. The QOL segment of this outreach has been reviewed and updated.       Reassessment Plan & Follow-Up  1. Medication Therapy Changes: none  2. Related Plans, Therapy Recommendations, or Issues to Be Addressed: none  3. Pharmacist to perform regular assessments no more than (6) months from the previous assessment.  4. Care Coordinator to set up future refill outreaches, coordinate prescription delivery, and escalate clinical questions to pharmacist.    Attestation      I attest the patient was actively involved in and has agreed to the above plan of care.  If the prescribed therapy is at any point deemed not appropriate based on the current or future assessments, a consultation will be initiated with the patient's specialty care provider to determine the best course of action. The revised plan of therapy will be documented along with any required assessments and/or additional patient education provided.     Trevon Méndez, Elizabeth, MPH, BCPS   Clinical Specialty Pharmacist, Endocrinology  9/28/2023  15:03 CDT